# Patient Record
Sex: FEMALE | Race: WHITE | NOT HISPANIC OR LATINO | Employment: FULL TIME | URBAN - METROPOLITAN AREA
[De-identification: names, ages, dates, MRNs, and addresses within clinical notes are randomized per-mention and may not be internally consistent; named-entity substitution may affect disease eponyms.]

---

## 2017-04-12 DIAGNOSIS — E66.01 MORBID OBESITY, UNSPECIFIED OBESITY TYPE (H): Primary | ICD-10-CM

## 2017-05-17 ENCOUNTER — MYC MEDICAL ADVICE (OUTPATIENT)
Dept: FAMILY MEDICINE | Facility: CLINIC | Age: 32
End: 2017-05-17

## 2018-03-05 ENCOUNTER — NURSE TRIAGE (OUTPATIENT)
Dept: NURSING | Facility: CLINIC | Age: 33
End: 2018-03-05

## 2018-03-05 NOTE — TELEPHONE ENCOUNTER
Reason for Disposition    Fever present > 3 days (72 hours)    Additional Information    Negative: Influenza has been diagnosed by a HCP    Influenza suspected (i.e., fever and respiratory symptoms; probable influenza exposure)    Negative: Severe difficulty breathing (e.g., struggling for each breath, speaks in single words)    Negative: Bluish lips, tongue, or face now    Negative: Shock suspected (e.g., cold/pale/clammy skin, too weak to stand, low BP, rapid pulse)    Negative: Sounds like a life-threatening emergency to the triager    Negative: Severe sore throat    Negative: [1] Doesn't match the criteria for Influenza AND [2] sounds like a cold    Negative: Influenza vaccine reaction is suspected    Negative: Chest pain  (Exception: MILD central chest pain, present only when coughing)    Negative: [1] Headache AND [2] stiff neck (can't touch chin to chest)    Negative: Fever > 104 F (40 C)    Negative: [1] Difficulty breathing AND [2] not severe AND [3] not from stuffy nose (e.g., not relieved by cleaning out the nose)    Negative: Patient sounds very sick or weak to the triager    Negative: [1] Fever > 101 F (38.3 C) AND [2] age > 60    Negative: [1] Fever > 101 F (38.3 C) AND [2] bedridden (e.g., nursing home patient, CVA, chronic illness, recovering from surgery)    Negative: [1] Fever > 100.5 F (38.1 C) AND [2] diabetes mellitus or weak immune system (e.g., HIV positive, cancer chemo, splenectomy, organ transplant, chronic steroids)    Negative: Patient is HIGH RISK (e.g., age > 64 years, pregnant, HIV+, or chronic medical condition)    Protocols used: INFLUENZA - SEASONAL-ADULT-, INFLUENZA EXPOSURE-ADULT-  Caller states she is having flu symptoms and was exposed at the  she works at. Caller states she has body aches, sore throat, fever and stuffy nose. Triage guidelines recommend to see provider within 24 hours.Caller verbalized and understands directives.

## 2019-03-19 ENCOUNTER — NURSE TRIAGE (OUTPATIENT)
Dept: NURSING | Facility: CLINIC | Age: 34
End: 2019-03-19

## 2019-03-19 ENCOUNTER — HOSPITAL ENCOUNTER (EMERGENCY)
Facility: CLINIC | Age: 34
Discharge: HOME | End: 2019-03-19
Attending: PHYSICIAN ASSISTANT | Admitting: PHYSICIAN ASSISTANT

## 2019-03-19 VITALS
OXYGEN SATURATION: 95 % | TEMPERATURE: 99.7 F | BODY MASS INDEX: 51.02 KG/M2 | HEIGHT: 63 IN | RESPIRATION RATE: 16 BRPM | HEART RATE: 110 BPM

## 2019-03-19 DIAGNOSIS — J11.1 INFLUENZA-LIKE ILLNESS: ICD-10-CM

## 2019-03-19 PROCEDURE — G0463 HOSPITAL OUTPT CLINIC VISIT: HCPCS | Performed by: PHYSICIAN ASSISTANT

## 2019-03-19 PROCEDURE — 99214 OFFICE O/P EST MOD 30 MIN: CPT | Mod: Z6 | Performed by: PHYSICIAN ASSISTANT

## 2019-03-19 RX ORDER — ALBUTEROL SULFATE 90 UG/1
2 AEROSOL, METERED RESPIRATORY (INHALATION) EVERY 6 HOURS PRN
Qty: 18 G | Refills: 0 | Status: SHIPPED | OUTPATIENT
Start: 2019-03-19 | End: 2020-03-04

## 2019-03-19 RX ORDER — BENZONATATE 100 MG/1
100-200 CAPSULE ORAL 3 TIMES DAILY PRN
Qty: 42 CAPSULE | Refills: 0 | Status: SHIPPED | OUTPATIENT
Start: 2019-03-19 | End: 2019-04-18

## 2019-03-19 NOTE — ED AVS SNAPSHOT
[facility] Emergency Department  [address] [address]  Phone:  [phone]  Fax:  [phone]                                    Mary Alice Damon   MRN: [id number]    Department:  [facility] Emergency Department   Date of Visit:  3/19/2019           After Visit Summary Signature Page    I have received my discharge instructions, and my questions have been answered. I have discussed any challenges I see with this plan with the nurse or doctor.    ..........................................................................................................................................  Patient/Patient Representative Signature      ..........................................................................................................................................  Patient Representative Print Name and Relationship to Patient    ..................................................               ................................................  Date                                   Time    ..........................................................................................................................................  Reviewed by Signature/Title    ...................................................              ..............................................  Date                                               Time          22EPIC Rev 08/18

## 2019-03-19 NOTE — TELEPHONE ENCOUNTER
"Patient states she has a productive cough since 3/17/19. .   Also describes feeling achy all over and fever.  Currently temperature is 101.0 (O).  Describes a \"backache from coughing.\"   States had respiratory tightness last night when climbing stairs. States it may be due to feeling anxious with constant cough.  Has used \"Dayquil and ibuprofen and a vapor inhaler\" with slight temporary relief.   Attempting to take increased fluids but decreased intake.     Protocol- Cough- Acute Productive- Adult     Care advice reviewed.   Disposition-  See Physician within 24 hours    Caller states understanding of the recommended disposition. States she will go to [facility] today.   Advised to call back if further questions or concerns.     ROXANE Ames RN  [facility]     Reason for Disposition    [1] Continuous (nonstop) coughing interferes with work or school AND [2] no improvement using cough treatment per Care Advice    Additional Information    Negative: Severe difficulty breathing (e.g., struggling for each breath, speaks in single words)    Negative: Bluish lips, tongue, or face now    Negative: [1] Difficulty breathing AND [2] exposure to flames, smoke, or fumes    Negative: [1] Stridor AND [2] difficulty breathing    Negative: Sounds like a life-threatening emergency to the triager    Negative: Chest pain  (Exception: MILD central chest pain, present only when coughing)    Negative: Difficulty breathing    Negative: Patient sounds very sick or weak to the triager    Negative: [1] Coughed up blood AND [2] > 1 tablespoon (15 ml) (Exception: blood-tinged sputum)    Negative: Fever > 103 F (39.4 C)    Negative: [1] Fever > 101 F (38.3 C) AND [2] age > 60    Negative: [1] Fever > 101 F (38.3 C) AND [2] bedridden (e.g., nursing home patient, CVA, chronic illness, recovering from surgery)    Negative: [1] Fever > 100.5 F (38.1 C) AND [2] diabetes mellitus or weak immune system (e.g., HIV " positive, cancer chemo, splenectomy, organ transplant, chronic steroids)    Negative: Wheezing is present    Negative: SEVERE coughing spells (e.g., whooping sound after coughing, vomiting after coughing)    Protocols used: COUGH - ACUTE PRODUCTIVE-ADULT-AH

## 2019-03-19 NOTE — ED PROVIDER NOTES
History     Chief Complaint   Patient presents with     Cough     cough and cold since Sunday with fever     HPI  Mary Alice Damon is a 33 year old female who presents to the urgent care with concern over cough and fever which been present for the last 48 hours.  Patient complains of fever measured up to 101.8 orally.  She also notes chills, myalgias, sore throat, shortness of breath with activity and possible wheezing.  He has had minimal nasal congestion.  No nausea, vomiting, or abdominal pain.  She has attempted to treat with OTC antipyretic.  She denies any history of asthma, COPD or other breathing related disorders.  She has had multiple ill contacts through her work at a .  She did not receive an influenza vaccine this year.     Allergies:  No Known Allergies    Problem List:    Patient Active Problem List    Diagnosis Date Noted     Irregular menses 02/11/2016     Priority: Medium     Type 2 diabetes mellitus without complications (H) 10/23/2015     Priority: Medium     Elevated liver enzymes 10/08/2015     Priority: Medium     Female infertility, secondary 10/06/2015     Priority: Medium     Morbidly obese (H) 08/14/2014     Priority: Medium      Past Medical History:    Past Medical History:   Diagnosis Date     Irritable bowel syndrome (IBS)      Past Surgical History:    Past Surgical History:   Procedure Laterality Date     TONSILLECTOMY  6th grade     Family History:    Family History   Problem Relation Age of Onset     Heart Disease Father      Social History:  Marital Status:  Single [1]  Social History     Tobacco Use     Smoking status: Former Smoker     Smokeless tobacco: Never Used   Substance Use Topics     Alcohol use: Yes     Alcohol/week: 0.0 oz     Comment: rare     Drug use: No      Medications:      Acetaminophen (TYLENOL PO)   medroxyPROGESTERone (PROVERA) 10 MG tablet   Prenatal Vit-Fe Fumarate-FA (PRENATAL PO)     Review of Systems  CONSTITUTIONAL:POSITIVE  for fever, chills  "myalgias   INTEGUMENTARY/SKIN: NEGATIVE for worrisome rashes, moles or lesions  EYES: NEGATIVE for vision changes or irritation  ENT/MOUTH: POSITIVE for sore throat, bilateral ear pain, minimal nasal congestion  RESP:POSITIVE for cough, shortness of breath, wheezing   GI: NEGATIVE for abdominal pain, nausea and vomiting  Physical Exam   Pulse: 110  Temp: 99.7  F (37.6  C)  Resp: 16  Height: 160 cm (5' 3\")  SpO2: 95 %  Physical Exam  GENERAL APPEARANCE: healthy, alert and no distress  EYES: EOMI,  PERRL, conjunctiva clear  HENT: ear canals and TM's normal.  Nose and mouth without ulcers, erythema or lesions  NECK: supple, nontender, no lymphadenopathy  RESP: lungs clear to auscultation - no rales, rhonchi or wheezes  CV: tachycardia, regular hythm, normal S1 S2, no murmur noted  ABDOMEN:  soft, nontender, no HSM or masses and bowel sounds normal  SKIN: no suspicious lesions or rashes  ED Course        Procedures        Critical Care time:  none        No results found for this or any previous visit (from the past 24 hour(s)).  Medications - No data to display    Assessments & Plan (with Medical Decision Making)     I have reviewed the nursing notes.    I have reviewed the findings, diagnosis, plan and need for follow up with the patient.          Medication List      Started    albuterol 108 (90 Base) MCG/ACT inhaler  Commonly known as:  PROAIR HFA/PROVENTIL HFA/VENTOLIN HFA  2 puffs, Inhalation, EVERY 6 HOURS PRN     benzonatate 100 MG capsule  Commonly known as:  TESSALON  100-200 mg, Oral, 3 TIMES DAILY PRN          Final diagnoses:   Influenza-like illness     33-year-old female presents to the urgent care with concern over 48-hour history of fever up to 101.8, chills, myalgias, cough, shortness of breath and possible wheezing.  She was noted to be minimally tachycardic upon arrival, remainder of vital signs within normal limits.  Physical exam findings were benign.  Symptoms are classic for influenza-like " illness.  I did discuss her/benefits of testing for influenza as well as risk/benefits of empiric treatment with Tamiflu and patient declined both.  Differential for her symptoms and include other viral URI.  I do not suspect pneumonia, pertussis, PE, bacterial sinusitis and will defer further evaluation.  She was discharged home stable with prescription for Tessalon, albuterol.  Follow-up with primary care provider for recheck if no resolution of fever within the next 3 days.  Worrisome reasons to return to the ER/UC sooner discussed.    Disclaimer: This note consists of symbols derived from keyboarding, dictation, and/or voice recognition software. As a result, there may be errors in the script that have gone undetected.  Please consider this when interpreting information found in the chart.      3/19/2019   [facility] EMERGENCY DEPARTMENT     Ashlie Houston PA-C  03/21/19 1112

## 2019-10-23 ENCOUNTER — OFFICE VISIT (OUTPATIENT)
Dept: FAMILY MEDICINE | Facility: CLINIC | Age: 34
End: 2019-10-23

## 2019-10-23 ENCOUNTER — NURSE TRIAGE (OUTPATIENT)
Dept: NURSING | Facility: CLINIC | Age: 34
End: 2019-10-23

## 2019-10-23 VITALS
DIASTOLIC BLOOD PRESSURE: 88 MMHG | BODY MASS INDEX: 49.15 KG/M2 | OXYGEN SATURATION: 98 % | TEMPERATURE: 97.9 F | HEIGHT: 63 IN | WEIGHT: 277.4 LBS | RESPIRATION RATE: 14 BRPM | HEART RATE: 92 BPM | SYSTOLIC BLOOD PRESSURE: 138 MMHG

## 2019-10-23 DIAGNOSIS — R21 RASH AND NONSPECIFIC SKIN ERUPTION: Primary | ICD-10-CM

## 2019-10-23 DIAGNOSIS — Z23 NEED FOR TD VACCINE: ICD-10-CM

## 2019-10-23 DIAGNOSIS — Z23 NEED FOR PROPHYLACTIC VACCINATION AND INOCULATION AGAINST INFLUENZA: ICD-10-CM

## 2019-10-23 PROCEDURE — 90471 IMMUNIZATION ADMIN: CPT | Performed by: NURSE PRACTITIONER

## 2019-10-23 PROCEDURE — 90714 TD VACC NO PRESV 7 YRS+ IM: CPT | Performed by: NURSE PRACTITIONER

## 2019-10-23 PROCEDURE — 90472 IMMUNIZATION ADMIN EACH ADD: CPT | Performed by: NURSE PRACTITIONER

## 2019-10-23 PROCEDURE — 99203 OFFICE O/P NEW LOW 30 MIN: CPT | Mod: 25 | Performed by: NURSE PRACTITIONER

## 2019-10-23 PROCEDURE — 90686 IIV4 VACC NO PRSV 0.5 ML IM: CPT | Performed by: NURSE PRACTITIONER

## 2019-10-23 RX ORDER — HYDROXYZINE HYDROCHLORIDE 10 MG/1
10-20 TABLET, FILM COATED ORAL EVERY 6 HOURS PRN
Qty: 20 TABLET | Refills: 1 | Status: SHIPPED | OUTPATIENT
Start: 2019-10-23 | End: 2020-03-04

## 2019-10-23 ASSESSMENT — MIFFLIN-ST. JEOR: SCORE: 1917.41

## 2019-10-23 NOTE — TELEPHONE ENCOUNTER
She had set up an appointment and will keep that rather than go into the ER for evaluation. She has itching and when she showers, it seems like it's welts, like hives would be. Then it settles down into a pink rash.  Loreta Ahn RN-[facility]      Reason for Disposition    Rash looks like large or small blisters (i.e., fluid filled bubbles or sacs on the skin)    Additional Information    Negative: [1] Life-threatening reaction (anaphylaxis) in the past to similar substance (e.g., food, insect bite/sting, chemical, etc.) AND [2] < 2 hours since exposure    Negative: [1] Sudden onset of rash (within last 2 hours) AND [2] difficulty with breathing or swallowing    Negative: Shock suspected (e.g., cold/pale/clammy skin, too weak to stand, low BP, rapid pulse)    Negative: Difficult to awaken or acting confused (e.g., disoriented, slurred speech)    Negative: [1] Purple or blood-colored spots or dots AND [2] fever    Negative: Sounds like a life-threatening emergency to the triager    Negative: Insect bites suspected    Negative: Swimmer's Itch suspected    Negative: Sunburn suspected    Negative: Hives suspected    Negative: [1] Chickenpox suspected AND [2] known exposure to chickenpox in past 3 weeks    Negative: [1] Drug rash suspected AND [2] started taking new medicine within last 2 weeks(Exception: antihistamine, eye drops, ear drops, decongestant or other OTC cough/cold medicines)    Negative: [1] Widespread rash AND [2] bright red, sunburn-like AND [3] current tampon use or nasal packing    Negative: [1] Widespread rash AND [2] bright red, sunburn-like AND [3] wound infection or recent surgery    Negative: [1] Bright red skin AND [2] peels off in sheets    Negative: Stiff neck (unable to touch chin to chest)    Negative: Fever    Negative: Joint pain or swelling    Protocols used: RASH OR REDNESS - WIDESPREAD-A-

## 2019-10-23 NOTE — PROGRESS NOTES
Subjective     Mary Alice Damon is a 34 year old female who presents to clinic today for the following health issues:  Chief Complaint   Patient presents with     Derm Problem     bilateral thighs x 1 week      Imm/Inj     Flu Shot       HPI   Rash      Duration: x 1 week    Description  Location: entire body per patient.   Itching: severe    Intensity:  severe    Accompanying signs and symptoms: Pt states that it looks like a hive to start and then they blister. She states that the itching is severe and now it is painful.     History (similar episodes/previous evaluation): None    Precipitating or alleviating factors:  New exposures:  None. She does state that the only thing that was different was that she used a generic tampon but she immediately stopped when she saw the rash.   Recent travel: no      Therapies tried and outcome: none and calamine lotion    Pt also states that she just found out that she was adopted 1 month ago.     No history of sensitive skin or allergies.  No recent travel or exposures.        Patient Active Problem List   Diagnosis     Morbidly obese (H)     Female infertility, secondary     Elevated liver enzymes     Type 2 diabetes mellitus without complications (H)     Irregular menses     Past Surgical History:   Procedure Laterality Date     TONSILLECTOMY  6th grade       Social History     Tobacco Use     Smoking status: Former Smoker     Packs/day: 0.00     Smokeless tobacco: Never Used   Substance Use Topics     Alcohol use: Yes     Alcohol/week: 0.0 standard drinks     Comment: rare     Family History   Problem Relation Age of Onset     Heart Disease Father      Diabetes Father          Current Outpatient Medications   Medication Sig Dispense Refill     hydrOXYzine (ATARAX) 10 MG tablet Take 1-2 tablets (10-20 mg) by mouth every 6 hours as needed for itching 20 tablet 1     permethrin (ELIMITE) 1 % external lotion Apply topically once for 1 dose 120 mL 0     Acetaminophen (TYLENOL PO)  "Take 1 tablet by mouth as needed for mild pain or fever        albuterol (PROAIR HFA/PROVENTIL HFA/VENTOLIN HFA) 108 (90 Base) MCG/ACT inhaler Inhale 2 puffs into the lungs every 6 hours as needed for shortness of breath / dyspnea or wheezing (Patient not taking: Reported on 10/23/2019) 18 g 0     medroxyPROGESTERone (PROVERA) 10 MG tablet Take 1 tablet (10 mg) by mouth daily (Patient not taking: Reported on 10/23/2019) 10 tablet 6     Prenatal Vit-Fe Fumarate-FA (PRENATAL PO)        No Known Allergies  Recent Labs   Lab Test 10/23/15  1020 10/08/15  0924 11/05/14  0910   A1C 6.8*  --   --    LDL  --  91  --    HDL  --  51  --    TRIG  --  143  --    ALT 86* 89* 61*   CR  --  0.54 0.69   GFRESTIMATED  --  >90  Non  GFR Calc   >90  Non  GFR Calc     GFRESTBLACK  --  >90   GFR Calc   >90   GFR Calc     POTASSIUM  --  3.7 3.8   TSH  --  0.74 0.25*      BP Readings from Last 3 Encounters:   10/23/19 (!) 144/92   04/18/16 137/79   12/28/15 139/82    Wt Readings from Last 3 Encounters:   10/23/19 125.8 kg (277 lb 6.4 oz)   04/18/16 130.6 kg (288 lb)   12/28/15 128.4 kg (283 lb)                    Reviewed and updated as needed this visit by Provider  Problems         Review of Systems   ROS COMP: Constitutional, HEENT, cardiovascular, pulmonary, GI, , musculoskeletal, neuro, skin, endocrine and psych systems are negative, except as otherwise noted.      Objective    BP (!) 144/92 (BP Location: Right arm, Patient Position: Sitting, Cuff Size: Adult Regular)   Pulse 92   Temp 97.9  F (36.6  C) (Tympanic)   Resp 14   Ht 1.6 m (5' 3\")   Wt 125.8 kg (277 lb 6.4 oz)   SpO2 98%   BMI 49.14 kg/m    Body mass index is 49.14 kg/m .  Physical Exam   GENERAL: healthy, alert and no distress  SKIN: scattered pinpoint mildly erythematous rash on thighs, calves, abdomen, arms - some mild excoriation.    Diagnostic Test Results:  Labs reviewed in Epic    " "    Assessment & Plan     1. Rash and nonspecific skin eruption   The risks, benefits and treatment options of prescribed medications or other treatments have been discussed with the patient. The patient verbalized their understanding and should call or follow up if no improvement or if they develop further problems.        - hydrOXYzine (ATARAX) 10 MG tablet; Take 1-2 tablets (10-20 mg) by mouth every 6 hours as needed for itching  Dispense: 20 tablet; Refill: 1  - permethrin (ELIMITE) 1 % external lotion; Apply topically once for 1 dose  Dispense: 120 mL; Refill: 0    2. Need for Td vaccine     - TD PRESERV FREE, IM (7+ YRS)  - EA ADD'L VACCINE  - INFLUENZA VACCINE IM > 6 MONTHS VALENT IIV4 [07337]  - Vaccine Administration, Initial [11761]    3. Need for prophylactic vaccination and inoculation against influenza     - INFLUENZA VACCINE IM > 6 MONTHS VALENT IIV4 [39561]  - Vaccine Administration, Initial [21545]     BMI:   Estimated body mass index is 49.14 kg/m  as calculated from the following:    Height as of this encounter: 1.6 m (5' 3\").    Weight as of this encounter: 125.8 kg (277 lb 6.4 oz).     See PCP for weight and diet.      Patient Instructions   Start Zyrtec 10 mg twice daily for the next 3-4 days - then can take once daily and then stop when rash resolves.    Hydroxyzine 10-20 mg as needed (every 8 hours) for itching and rash.  May also use over the counter topical cortisone cream.    Elimite lotion - if above not responsive. To treat for possible scabies.    Follow up if rash not improving.    LALITA Andre          Return in about 2 weeks (around 11/6/2019).    Itzel Jodran NP  [facility]    "

## 2019-10-23 NOTE — PATIENT INSTRUCTIONS
Start Zyrtec 10 mg twice daily for the next 3-4 days - then can take once daily and then stop when rash resolves.    Hydroxyzine 10-20 mg as needed (every 8 hours) for itching and rash.  May also use over the counter topical cortisone cream.    Elimite lotion - if above not responsive. To treat for possible scabies.    Follow up if rash not improving.    LALITA Andre

## 2019-10-23 NOTE — NURSING NOTE
Prior to immunization administration, verified patients identity using patient s name and date of birth. Please see Immunization Activity for additional information.     Screening Questionnaire for Adult Immunization    Are you sick today?   No   Do you have allergies to medications, food, a vaccine component or latex?   No   Have you ever had a serious reaction after receiving a vaccination?   No   Do you have a long-term health problem with heart disease, lung disease, asthma, kidney disease, metabolic disease (e.g. diabetes), anemia, or other blood disorder?   Yes   Do you have cancer, leukemia, HIV/AIDS, or any other immune system problem?   No   In the past 3 months, have you taken medications that affect  your immune system, such as prednisone, other steroids, or anticancer drugs; drugs for the treatment of rheumatoid arthritis, Crohn s disease, or psoriasis; or have you had radiation treatments?   No   Have you had a seizure, or a brain or other nervous system problem?   No   During the past year, have you received a transfusion of blood or blood     products, or been given immune (gamma) globulin or antiviral drug?   No   For women: Are you pregnant or is there a chance you could become        pregnant during the next month?   No   Have you received any vaccinations in the past 4 weeks?   No     Immunization questionnaire was positive for at least one answer. Ok per guidelines for TD and FLu injections          Patient instructed to remain in clinic for 15 minutes afterwards, and to report any adverse reaction to me immediately.       Screening performed by Addy Hansen CMA on 10/23/2019 at 9:42 AM.

## 2019-10-24 ENCOUNTER — TELEPHONE (OUTPATIENT)
Dept: FAMILY MEDICINE | Facility: CLINIC | Age: 34
End: 2019-10-24

## 2019-10-24 DIAGNOSIS — R21 RASH AND NONSPECIFIC SKIN ERUPTION: Primary | ICD-10-CM

## 2019-10-24 RX ORDER — PERMETHRIN 50 MG/G
CREAM TOPICAL
Qty: 60 G | Refills: 1 | Status: SHIPPED | OUTPATIENT
Start: 2019-10-24 | End: 2020-03-04

## 2019-10-24 NOTE — TELEPHONE ENCOUNTER
Routed to care team as provider is out of clinic today.  Permethrin lotion 1% was prescribed for bilateral thigh rash.    Cream option comes in 5% strength.    Sabrina Flaherty RN

## 2019-10-24 NOTE — TELEPHONE ENCOUNTER
"Request from pharmacy states, \"Cream not avail-can we change to cream? Thank you\" The prescription is for PERMETHRIN 1% LOTION... did they mean lotion is not available please change to cream?? Thanks  "

## 2019-11-03 ENCOUNTER — HEALTH MAINTENANCE LETTER (OUTPATIENT)
Age: 34
End: 2019-11-03

## 2020-03-04 ENCOUNTER — OFFICE VISIT (OUTPATIENT)
Dept: FAMILY MEDICINE | Facility: CLINIC | Age: 35
End: 2020-03-04
Payer: COMMERCIAL

## 2020-03-04 VITALS
HEART RATE: 84 BPM | WEIGHT: 268.25 LBS | TEMPERATURE: 98.6 F | SYSTOLIC BLOOD PRESSURE: 132 MMHG | HEIGHT: 65 IN | RESPIRATION RATE: 20 BRPM | BODY MASS INDEX: 44.69 KG/M2 | OXYGEN SATURATION: 97 % | DIASTOLIC BLOOD PRESSURE: 80 MMHG

## 2020-03-04 DIAGNOSIS — F41.9 ANXIETY: ICD-10-CM

## 2020-03-04 DIAGNOSIS — F41.0 PANIC ATTACKS: Primary | ICD-10-CM

## 2020-03-04 PROCEDURE — 99214 OFFICE O/P EST MOD 30 MIN: CPT | Performed by: FAMILY MEDICINE

## 2020-03-04 PROCEDURE — 96127 BRIEF EMOTIONAL/BEHAV ASSMT: CPT | Mod: 59 | Performed by: FAMILY MEDICINE

## 2020-03-04 RX ORDER — LORAZEPAM 0.5 MG/1
0.5 TABLET ORAL EVERY 8 HOURS PRN
Qty: 30 TABLET | Refills: 0 | Status: SHIPPED | OUTPATIENT
Start: 2020-03-04 | End: 2020-11-05

## 2020-03-04 ASSESSMENT — ANXIETY QUESTIONNAIRES
3. WORRYING TOO MUCH ABOUT DIFFERENT THINGS: NEARLY EVERY DAY
6. BECOMING EASILY ANNOYED OR IRRITABLE: MORE THAN HALF THE DAYS
1. FEELING NERVOUS, ANXIOUS, OR ON EDGE: SEVERAL DAYS
IF YOU CHECKED OFF ANY PROBLEMS ON THIS QUESTIONNAIRE, HOW DIFFICULT HAVE THESE PROBLEMS MADE IT FOR YOU TO DO YOUR WORK, TAKE CARE OF THINGS AT HOME, OR GET ALONG WITH OTHER PEOPLE: NOT DIFFICULT AT ALL
2. NOT BEING ABLE TO STOP OR CONTROL WORRYING: MORE THAN HALF THE DAYS
GAD7 TOTAL SCORE: 14
7. FEELING AFRAID AS IF SOMETHING AWFUL MIGHT HAPPEN: MORE THAN HALF THE DAYS
5. BEING SO RESTLESS THAT IT IS HARD TO SIT STILL: MORE THAN HALF THE DAYS

## 2020-03-04 ASSESSMENT — PATIENT HEALTH QUESTIONNAIRE - PHQ9
SUM OF ALL RESPONSES TO PHQ QUESTIONS 1-9: 2
5. POOR APPETITE OR OVEREATING: MORE THAN HALF THE DAYS

## 2020-03-04 ASSESSMENT — MIFFLIN-ST. JEOR: SCORE: 1899.71

## 2020-03-04 NOTE — PROGRESS NOTES
"Subjective     Mary Alice Damon is a 34 year old female who presents to clinic today for the following health issues:    HPI   Abnormal Mood Symptoms      Duration: ongoing for the past couple of years - worse over the past six months     Having a least one \" panic attack\" a month     Description:  Depression: no- still able to go to work do normal activities   Anxiety: YES - worries about 'everything; worries that something bad is always going to happen.  Panic attacks: YES - described as tachycardia, hyperventilating, feeling of impending doom, feeling she needs to go outdoors \"for air\"; I feel like I'm going crazy\"; patient reports she feels she is going to lose control of everything   Denies specific triggers.  Denies repetitive rechecking or redoing tasks or actions; denies suicidality/homicidality; denies hallucinations.    Accompanying signs and symptoms: see PHQ-9 and ROBERT scores    History (similar episodes/previous evaluation): None    Precipitating or alleviating factors: None    Therapies tried and outcome:  Working out , Mood apps on phone/ was trying to manage at home without trying medication . Having a lot of stress about starting medications.    Patient denies being treated for any psychiatric condition in the past.    Patient Active Problem List   Diagnosis     Morbidly obese (H)     Female infertility, secondary     Elevated liver enzymes     Type 2 diabetes mellitus without complications (H)     Irregular menses     Past Surgical History:   Procedure Laterality Date     TONSILLECTOMY  6th grade       Social History     Tobacco Use     Smoking status: Former Smoker     Packs/day: 0.00     Smokeless tobacco: Never Used   Substance Use Topics     Alcohol use: Yes     Alcohol/week: 0.0 standard drinks     Comment: rare     Family History   Problem Relation Age of Onset     Heart Disease Father      Diabetes Father          Current Outpatient Medications   Medication Sig Dispense Refill     LORazepam " "(ATIVAN) 0.5 MG tablet Take 1 tablet (0.5 mg) by mouth every 8 hours as needed (at start of panic attacks) 30 tablet 0     No Known Allergies    Reviewed and updated as needed this visit by Provider  Tobacco  Allergies  Meds  Problems  Med Hx  Surg Hx  Fam Hx         Review of Systems   C: NEGATIVE for fever, chills, change in weight  I: NEGATIVE for worrisome rashes, moles or lesions  E: NEGATIVE for vision changes or irritation  E/M: NEGATIVE for ear, mouth and throat problems  R: NEGATIVE for significant cough or SOB  CV: NEGATIVE for chest pain, palpitations or peripheral edema  GI: NEGATIVE for nausea, abdominal pain, heartburn, or change in bowel habits  : NEGATIVE for frequency, dysuria, or hematuria  N: NEGATIVE for weakness, dizziness or paresthesias  E: NEGATIVE for temperature intolerance, skin/hair changes  PSYCHIATRIC: see above      Objective    /80   Pulse 84   Temp 98.6  F (37  C) (Tympanic)   Resp 20   Ht 1.638 m (5' 4.5\")   Wt 121.7 kg (268 lb 4 oz)   SpO2 97%   BMI 45.33 kg/m    Body mass index is 45.33 kg/m .  Physical Exam   GENERAL: alert and no distress  EYES: no icterus, PERRLA  NECK: no tenderness, no adenopathy,  Thyroid not enlarged  RESP: lungs clear to auscultation - no rales, no rhonchi, no wheezes  CV: regular rates and rhythm, no murmur  MS: no edema  SKIN: no jaundice or rash  NEURO: no tremors  PSYCH: well-kempt,  linear thought process, normal speech, good insight/judgement, slightly anxious mood, labile affect (tearful when she started describing her symptoms), no suicidality, no aggression, no hallucination  ABD: flat, nontender    Diagnostic Test Results:  none         Assessment & Plan     Mary Alice was seen today for anxiety.    Diagnoses and all orders for this visit:    Panic attacks  -     LORazepam (ATIVAN) 0.5 MG tablet; Take 1 tablet (0.5 mg) by mouth every 8 hours as needed (at start of panic attacks)  -     MENTAL HEALTH REFERRAL  - Adult; Outpatient " "Treatment; Individual/Couples/Family/Group Therapy/Health Psychology; [facility] [place]: [facility] [phone]; We will contact you to schedule the appointment or please call with any questions  Patient was advised on nature, course and treatment of panic attacks.  Reassured her there is no obvious serious condition at this time.  Discussed safe use of benzos in detail. Patient verbalized understanding and agreed to do so.  Patient concurred with CBT referral.  Return precautions discussed and given to patient.    Anxiety  -     MENTAL HEALTH REFERRAL  - Adult; Outpatient Treatment; Individual/Couples/Family/Group Therapy/Health Psychology; [facility] [place]: [facility] [phone]; We will contact you to schedule the appointment or please call with any questions  Does have \"daily anxiety\".   However, she denies it significantly affecting her ADLs and work.  Discussed pros and cons of medical tx with serotonin specific reuptake inhibitor. Patient deferred starting one.  Treat with CBT first. Will consider serotonin specific reuptake inhibitor if not better after next 1-2 months.  Return precautions discussed and given to patient.           Patient Instructions   Take lorazepam as prescribed only for panic attacks.  Do not drive or operate heavy equipment when on this medicaiton.  Do not drink alcohol when taking this medication, or up to several hours from taking medication.    You will be contacted in 1-2 business days to get a schedule for the behavior therapist.    Reduce social media exposure.  Watch news maybe once a day only if you get worried when watching it.    Patient Education     Panic Attack  A panic attack is an extreme fear reaction that comes on for no clear reason. There is often a fear that something terrible will happen or that you may die. The attack may last a few minutes up to a few hours. Between attacks, things will seem quite normal. This condition has a psychological cause and " can be treated with the help of a therapist or psychiatrist. Medicine can be very helpful for this problem.  Panic attacks usually come on suddenly, reaches a peak within minutes, and includes at least 4 of these symptoms:    Palpitations, pounding heart, or accelerated heart rate    Sweating    Chills or heat sensations    Trembling or shaking    Sensations of shortness of breath or smothering    Feelings of choking    Chest pain or discomfort    Nausea or abdominal distress    Feeling dizzy, unsteady, light-headed, or faint    Numbness or tingling sensations    Fear of dying    Fear of going crazy or of losing control    Feelings of unreality, strangeness, or detachment from the environment  Many of these symptoms can be linked to physical problems, so it is sometimes necessary to rule out conditions like thyroid disorders, heart disease, gastrointestinal problems, and others. They can also start as physical symptoms, but psychologically we may react to them in a fearful way, worsening the way we react and feel.  Home care    Try to find the sources of stress in your life. They may not be obvious. These may include:  ? Daily hassles of life which pile up (traffic jams, missed appointments, car troubles).  ? Major life changes, both good (new baby, job promotion) and bad (loss of job, loss of loved one).  ? Feeling that you have too many responsibilities and can't take care of everything at once.  ? Helplessness: feeling like your problems are too much for you to handle.    Notice how your body reacts to stress. Learn to listen to your body signals so that you can take action before the stress becomes severe.    Try to be aware of what you were doing before the reaction started; this may give you clues to things that can trigger a reaction. It may be situations in your life, or what you were doing at the time.    When possible, avoid or reduce the cause of stress. Avoid hassles, limit the amount of change that is  happening in your life at one time or take a break when you feel overloaded.    Unfortunately, you can't stay away from many stressful situations. So you need to learn how to manage stress better. Many proven methods will reduce your anxiety. These include simple things like exercise, good nutrition, and adequate rest. Also, there are certain techniques that are helpful: relaxation and breathing exercises, visualization, biofeedback, meditation, or simply taking time-out to clear your mind. For more information about this, ask your doctor or go to a local bookstore and review the many books and tapes available on this subject.  Follow-up care  Follow-up with your healthcare provider, or as advised.  Call 911  Call 911 if you:    Have suicidal thoughts, a suicide plan, and the means to carry out the plan    Have serious thoughts of hurting someone else    Have trouble breathing    Are very confused    Feel very drowsy or have trouble awakening    Faint or lose consciousness    Have new chest pain that becomes more severe, lasts longer, or spreads into your shoulder, arm, neck, jaw, or back    Have a very rapid or irregular heartbeat    Have a seizure  When to seek medical advice  Call your healthcare provider right away if any of these occur:    Worsening of your symptoms to the point of feeling out-of-control    Feeling that you may try to harm yourself or another    Can't sleep or eat for 3 days in a row    Increased pain with breathing    Increasing feeling of weakness or dizziness    Cough with dark colored sputum (phlegm) or blood    Fever of 100.4 F (38 C) or higher, or as directed by your healthcare provider    Swelling, pain, or redness in one leg    Requests by family or friends for you to seek help for your symptoms  Date Last Reviewed: 3/1/2018    3220-1456 [employer]. [address] [place] [address], [place] [address]. All rights reserved. This information is not intended as a substitute for  professional medical care. Always follow your healthcare professional's instructions.               Return in about 1 month (around 4/4/2020) for if with more frequent or worsening attacks.    Crow Caballero MD  [facility]

## 2020-03-04 NOTE — PATIENT INSTRUCTIONS
Take lorazepam as prescribed only for panic attacks.  Do not drive or operate heavy equipment when on this medicaiton.  Do not drink alcohol when taking this medication, or up to several hours from taking medication.    You will be contacted in 1-2 business days to get a schedule for the behavior therapist.    Reduce social media exposure.  Watch news maybe once a day only if you get worried when watching it.    Patient Education     Panic Attack  A panic attack is an extreme fear reaction that comes on for no clear reason. There is often a fear that something terrible will happen or that you may die. The attack may last a few minutes up to a few hours. Between attacks, things will seem quite normal. This condition has a psychological cause and can be treated with the help of a therapist or psychiatrist. Medicine can be very helpful for this problem.  Panic attacks usually come on suddenly, reaches a peak within minutes, and includes at least 4 of these symptoms:    Palpitations, pounding heart, or accelerated heart rate    Sweating    Chills or heat sensations    Trembling or shaking    Sensations of shortness of breath or smothering    Feelings of choking    Chest pain or discomfort    Nausea or abdominal distress    Feeling dizzy, unsteady, light-headed, or faint    Numbness or tingling sensations    Fear of dying    Fear of going crazy or of losing control    Feelings of unreality, strangeness, or detachment from the environment  Many of these symptoms can be linked to physical problems, so it is sometimes necessary to rule out conditions like thyroid disorders, heart disease, gastrointestinal problems, and others. They can also start as physical symptoms, but psychologically we may react to them in a fearful way, worsening the way we react and feel.  Home care    Try to find the sources of stress in your life. They may not be obvious. These may include:  ? Daily hassles of life which pile up (traffic jams,  missed appointments, car troubles).  ? Major life changes, both good (new baby, job promotion) and bad (loss of job, loss of loved one).  ? Feeling that you have too many responsibilities and can't take care of everything at once.  ? Helplessness: feeling like your problems are too much for you to handle.    Notice how your body reacts to stress. Learn to listen to your body signals so that you can take action before the stress becomes severe.    Try to be aware of what you were doing before the reaction started; this may give you clues to things that can trigger a reaction. It may be situations in your life, or what you were doing at the time.    When possible, avoid or reduce the cause of stress. Avoid hassles, limit the amount of change that is happening in your life at one time or take a break when you feel overloaded.    Unfortunately, you can't stay away from many stressful situations. So you need to learn how to manage stress better. Many proven methods will reduce your anxiety. These include simple things like exercise, good nutrition, and adequate rest. Also, there are certain techniques that are helpful: relaxation and breathing exercises, visualization, biofeedback, meditation, or simply taking time-out to clear your mind. For more information about this, ask your doctor or go to a local bookstore and review the many books and tapes available on this subject.  Follow-up care  Follow-up with your healthcare provider, or as advised.  Call 911  Call 911 if you:    Have suicidal thoughts, a suicide plan, and the means to carry out the plan    Have serious thoughts of hurting someone else    Have trouble breathing    Are very confused    Feel very drowsy or have trouble awakening    Faint or lose consciousness    Have new chest pain that becomes more severe, lasts longer, or spreads into your shoulder, arm, neck, jaw, or back    Have a very rapid or irregular heartbeat    Have a seizure  When to seek medical  advice  Call your healthcare provider right away if any of these occur:    Worsening of your symptoms to the point of feeling out-of-control    Feeling that you may try to harm yourself or another    Can't sleep or eat for 3 days in a row    Increased pain with breathing    Increasing feeling of weakness or dizziness    Cough with dark colored sputum (phlegm) or blood    Fever of 100.4 F (38 C) or higher, or as directed by your healthcare provider    Swelling, pain, or redness in one leg    Requests by family or friends for you to seek help for your symptoms  Date Last Reviewed: 3/1/2018    0684-0367 [employer]. [address] [place] [id number]. All rights reserved. This information is not intended as a substitute for professional medical care. Always follow your healthcare professional's instructions.

## 2020-03-04 NOTE — NURSING NOTE
"Initial BP (!) 141/91 (BP Location: Right arm, Patient Position: Sitting, Cuff Size: Adult Large)   Pulse 84   Temp 98.6  F (37  C) (Tympanic)   Resp 20   Ht 1.638 m (5' 4.5\")   Wt 121.7 kg (268 lb 4 oz)   SpO2 97%   BMI 45.33 kg/m   Estimated body mass index is 45.33 kg/m  as calculated from the following:    Height as of this encounter: 1.638 m (5' 4.5\").    Weight as of this encounter: 121.7 kg (268 lb 4 oz). .    Christy Amaya MA    "

## 2020-03-05 ASSESSMENT — ANXIETY QUESTIONNAIRES: GAD7 TOTAL SCORE: 14

## 2020-03-09 ENCOUNTER — OFFICE VISIT (OUTPATIENT)
Dept: FAMILY MEDICINE | Facility: CLINIC | Age: 35
End: 2020-03-09
Payer: COMMERCIAL

## 2020-03-09 VITALS
DIASTOLIC BLOOD PRESSURE: 88 MMHG | RESPIRATION RATE: 18 BRPM | OXYGEN SATURATION: 98 % | BODY MASS INDEX: 44.98 KG/M2 | TEMPERATURE: 98.6 F | WEIGHT: 270 LBS | HEIGHT: 65 IN | SYSTOLIC BLOOD PRESSURE: 136 MMHG | HEART RATE: 80 BPM

## 2020-03-09 DIAGNOSIS — F41.9 ANXIETY: Primary | ICD-10-CM

## 2020-03-09 PROCEDURE — 99214 OFFICE O/P EST MOD 30 MIN: CPT | Performed by: FAMILY MEDICINE

## 2020-03-09 RX ORDER — PAROXETINE 10 MG/1
10 TABLET, FILM COATED ORAL EVERY MORNING
Qty: 30 TABLET | Refills: 0 | Status: SHIPPED | OUTPATIENT
Start: 2020-03-09 | End: 2020-04-22

## 2020-03-09 ASSESSMENT — ANXIETY QUESTIONNAIRES
3. WORRYING TOO MUCH ABOUT DIFFERENT THINGS: NEARLY EVERY DAY
2. NOT BEING ABLE TO STOP OR CONTROL WORRYING: MORE THAN HALF THE DAYS
1. FEELING NERVOUS, ANXIOUS, OR ON EDGE: NEARLY EVERY DAY
5. BEING SO RESTLESS THAT IT IS HARD TO SIT STILL: SEVERAL DAYS
GAD7 TOTAL SCORE: 15
7. FEELING AFRAID AS IF SOMETHING AWFUL MIGHT HAPPEN: MORE THAN HALF THE DAYS
6. BECOMING EASILY ANNOYED OR IRRITABLE: MORE THAN HALF THE DAYS

## 2020-03-09 ASSESSMENT — PATIENT HEALTH QUESTIONNAIRE - PHQ9
5. POOR APPETITE OR OVEREATING: MORE THAN HALF THE DAYS
SUM OF ALL RESPONSES TO PHQ QUESTIONS 1-9: 4

## 2020-03-09 ASSESSMENT — MIFFLIN-ST. JEOR: SCORE: 1907.65

## 2020-03-09 NOTE — PROGRESS NOTES
Subjective     Mary Alice Damon is a 34 year old female who presents to clinic today for the following health issues:    Patient is a 34 yr old female here for anxiety issues. She reports that in the last six months her anxiety has worsened. She is having more panic attacks that come out of nowhere. She reports that the symptoms are getting debilitating. She was seen last week and was prescribed lorazepam but she feels like she may benefit from a daily anxiety medication. She denies any suicidal ideations.      HPI   Anxiety Follow-Up    How are you doing with your anxiety since your last visit? Worsened Patient has not gotten medication yet due to insurance and did have a panic attack yesterday, was able to manage it but just getting out and getting some air     Are you having other symptoms that might be associated with anxiety? Yes:  panic attacks with rapid heart rate and stomach issures with diarrhea     Have you had a significant life event? OTHER: patient found out that she was adopted from father      Are you feeling depressed? No    Do you have any concerns with your use of alcohol or other drugs? No    Social History     Tobacco Use     Smoking status: Former Smoker     Packs/day: 0.00     Smokeless tobacco: Never Used   Substance Use Topics     Alcohol use: Yes     Alcohol/week: 0.0 standard drinks     Comment: rare     Drug use: No     ROBERT-7 SCORE 3/4/2020 3/9/2020   Total Score 14 15     PHQ 3/4/2020 3/9/2020   PHQ-9 Total Score 2 4   Q9: Thoughts of better off dead/self-harm past 2 weeks Not at all Not at all           How many servings of fruits and vegetables do you eat daily?  2-3    On average, how many sweetened beverages do you drink each day (Examples: soda, juice, sweet tea, etc.  Do NOT count diet or artificially sweetened beverages)?   1    How many days per week do you exercise enough to make your heart beat faster? 6    How many minutes a day do you exercise enough to make your heart beat  "faster? 20 - 29    How many days per week do you miss taking your medication? Not on medication         Patient Active Problem List   Diagnosis     Morbidly obese (H)     Female infertility, secondary     Elevated liver enzymes     Type 2 diabetes mellitus without complications (H)     Irregular menses     Past Surgical History:   Procedure Laterality Date     TONSILLECTOMY  6th grade       Social History     Tobacco Use     Smoking status: Former Smoker     Packs/day: 0.00     Smokeless tobacco: Never Used   Substance Use Topics     Alcohol use: Yes     Alcohol/week: 0.0 standard drinks     Comment: rare     Family History   Problem Relation Age of Onset     Heart Disease Father      Diabetes Father          Current Outpatient Medications   Medication Sig Dispense Refill     LORazepam (ATIVAN) 0.5 MG tablet Take 1 tablet (0.5 mg) by mouth every 8 hours as needed (at start of panic attacks) 30 tablet 0     PARoxetine (PAXIL) 10 MG tablet Take 1 tablet (10 mg) by mouth every morning 30 tablet 0     No Known Allergies  BP Readings from Last 3 Encounters:   03/09/20 136/88   03/04/20 132/80   10/23/19 138/88    Wt Readings from Last 3 Encounters:   03/09/20 122.5 kg (270 lb)   03/04/20 121.7 kg (268 lb 4 oz)   10/23/19 125.8 kg (277 lb 6.4 oz)                      Reviewed and updated as needed this visit by Provider  Tobacco  Allergies  Meds  Problems  Med Hx  Surg Hx  Fam Hx         Review of Systems   ROS COMP: Constitutional, HEENT, cardiovascular, pulmonary, gi and gu systems are negative, except as otherwise noted.      Objective    /88   Pulse 80   Temp 98.6  F (37  C) (Tympanic)   Resp 18   Ht 1.638 m (5' 4.5\")   Wt 122.5 kg (270 lb)   SpO2 98%   BMI 45.63 kg/m    Body mass index is 45.63 kg/m .  Physical Exam   GENERAL: healthy, alert and no distress  EYES: Eyes grossly normal to inspection, PERRL and conjunctivae and sclerae normal  HENT: ear canals and TM's normal, nose and mouth without " ulcers or lesions  NECK: no adenopathy, no asymmetry, masses, or scars and thyroid normal to palpation  RESP: lungs clear to auscultation - no rales, rhonchi or wheezes  CV: regular rate and rhythm, normal S1 S2, no S3 or S4, no murmur, click or rub, no peripheral edema and peripheral pulses strong  ABDOMEN: soft, nontender, no hepatosplenomegaly, no masses and bowel sounds normal  MS: no gross musculoskeletal defects noted, no edema  PSYCH: mentation appears normal, affect flat, tearful, anxious, judgement and insight intact and appearance well groomed    Diagnostic Test Results:  none         Assessment & Plan     1. Anxiety  Had a long discussion with patient , recommend counseling which patient is opened to.Started patient on Paxil. Will like her to follow up in one month .  - PARoxetine (PAXIL) 10 MG tablet; Take 1 tablet (10 mg) by mouth every morning  Dispense: 30 tablet; Refill: 0       FUTURE APPOINTMENTS:       - Follow-up visit in one month or sooner as needed.  Patient Instructions       Patient Education     Treating Anxiety Disorders with Therapy    If you have an anxiety disorder, you don t have to suffer anymore. Treatment is available. Therapy (also called counseling) is often a helpful treatment for anxiety disorders. With therapy, a specially trained professional (therapist) helps you face and learn to manage your anxiety. Therapy can be short-term or long-term depending on your needs. In some cases, medicine may also be prescribed with therapy. It may take time before you notice how much therapy is helping, but stick with it. With therapy, you can feel better.  Cognitive behavioral therapy (CBT)  Cognitive behavioral therapy (CBT) teaches you to manage anxiety. It does this by helping you understand how you think and act when you re anxious. Research has shown CBT to be a very effective treatment for anxiety disorders. How CBT is run is almost like a class. It involves homework and activities to  build skills that teach you to cope with anxiety step by step. It can be done in a group or one-on-one, and often takes place for a set number of sessions. CBT has two main parts:    Cognitive therapy helps you identify the negative, irrational thoughts that occur with your anxiety. You ll learn to replace these with more positive, realistic thoughts.    Behavioral therapy helps you change how you react to anxiety. You ll learn coping skills and methods for relaxing to help you better deal with anxiety.  Other forms of therapy  Other therapy methods may work better for you than CBT. Or, you may move from CBT to another form of therapy as your treatment needs change. This may mean meeting with a therapist by yourself or in a group. Therapy can also help you work through problems in your life, such as drug or alcohol dependence, that may be making your anxiety worse.  Getting better takes time  Therapy will help you feel better and teach you skills to help manage anxiety long term. But change doesn t happen right away. It takes a commitment from you. And treatment only works if you learn to face the causes of your anxiety. So, you might feel worse before you feel better. This can sometimes make it hard to stick with it. But remember: Therapy is a very effective treatment. The results will be well worth it.  Helping yourself  If anxiety is wearing you down, here are some things you can do to cope:    Check with your doctor and rule out any physical problems that may be causing the anxiety symptoms.    If an anxiety disorder is diagnosed, seek mental healthcare. This is an illness and it can respond to treatment. Most types of anxiety disorders will respond to talk therapy and medicine.    Educate yourself about anxiety disorders. Keep track of helpful online resources and books you can use during stressful periods.    Try stress management techniques such as meditation.    Consider online or in-person support  groups.    Don t fight your feelings. Anxiety feeds itself. The more you worry about it, the worse it gets. Instead, try to identify what might have triggered your anxiety. Then try to put this threat in perspective.    Keep in mind that you can t control everything about a situation. Change what you can and let the rest take its course.    Exercise -- it s a great way to relieve tension and help your body feel relaxed.    Examine your life for stress, and try to find ways to reduce it.    Avoid caffeine and nicotine, which can make anxiety symptoms worse.    Fight the temptation to turn to alcohol or unprescribed drugs for relief. They only make things worse in the long run.   Date Last Reviewed: 1/1/2017 2000-2019 [employer]. [address] [place] [address], [place] [address]. All rights reserved. This information is not intended as a substitute for professional medical care. Always follow your healthcare professional's instructions.               Return in about 4 weeks (around 4/6/2020) for Routine Visit.    Jose Escobar MD  [facility]

## 2020-03-09 NOTE — PATIENT INSTRUCTIONS
Patient Education     Treating Anxiety Disorders with Therapy    If you have an anxiety disorder, you don t have to suffer anymore. Treatment is available. Therapy (also called counseling) is often a helpful treatment for anxiety disorders. With therapy, a specially trained professional (therapist) helps you face and learn to manage your anxiety. Therapy can be short-term or long-term depending on your needs. In some cases, medicine may also be prescribed with therapy. It may take time before you notice how much therapy is helping, but stick with it. With therapy, you can feel better.  Cognitive behavioral therapy (CBT)  Cognitive behavioral therapy (CBT) teaches you to manage anxiety. It does this by helping you understand how you think and act when you re anxious. Research has shown CBT to be a very effective treatment for anxiety disorders. How CBT is run is almost like a class. It involves homework and activities to build skills that teach you to cope with anxiety step by step. It can be done in a group or one-on-one, and often takes place for a set number of sessions. CBT has two main parts:    Cognitive therapy helps you identify the negative, irrational thoughts that occur with your anxiety. You ll learn to replace these with more positive, realistic thoughts.    Behavioral therapy helps you change how you react to anxiety. You ll learn coping skills and methods for relaxing to help you better deal with anxiety.  Other forms of therapy  Other therapy methods may work better for you than CBT. Or, you may move from CBT to another form of therapy as your treatment needs change. This may mean meeting with a therapist by yourself or in a group. Therapy can also help you work through problems in your life, such as drug or alcohol dependence, that may be making your anxiety worse.  Getting better takes time  Therapy will help you feel better and teach you skills to help manage anxiety long term. But change doesn t  happen right away. It takes a commitment from you. And treatment only works if you learn to face the causes of your anxiety. So, you might feel worse before you feel better. This can sometimes make it hard to stick with it. But remember: Therapy is a very effective treatment. The results will be well worth it.  Helping yourself  If anxiety is wearing you down, here are some things you can do to cope:    Check with your doctor and rule out any physical problems that may be causing the anxiety symptoms.    If an anxiety disorder is diagnosed, seek mental healthcare. This is an illness and it can respond to treatment. Most types of anxiety disorders will respond to talk therapy and medicine.    Educate yourself about anxiety disorders. Keep track of helpful online resources and books you can use during stressful periods.    Try stress management techniques such as meditation.    Consider online or in-person support groups.    Don t fight your feelings. Anxiety feeds itself. The more you worry about it, the worse it gets. Instead, try to identify what might have triggered your anxiety. Then try to put this threat in perspective.    Keep in mind that you can t control everything about a situation. Change what you can and let the rest take its course.    Exercise -- it s a great way to relieve tension and help your body feel relaxed.    Examine your life for stress, and try to find ways to reduce it.    Avoid caffeine and nicotine, which can make anxiety symptoms worse.    Fight the temptation to turn to alcohol or unprescribed drugs for relief. They only make things worse in the long run.   Date Last Reviewed: 1/1/2017 2000-2019 [employer]. [address] [place] [address], [place] [address]. All rights reserved. This information is not intended as a substitute for professional medical care. Always follow your healthcare professional's instructions.

## 2020-03-10 ASSESSMENT — ANXIETY QUESTIONNAIRES: GAD7 TOTAL SCORE: 15

## 2020-03-17 ENCOUNTER — MYC MEDICAL ADVICE (OUTPATIENT)
Dept: FAMILY MEDICINE | Facility: CLINIC | Age: 35
End: 2020-03-17

## 2020-03-18 NOTE — TELEPHONE ENCOUNTER
Yes she can stop the Paxil as there is a potential side effect of diarrhea and if she is experiencing the diarrhea it likely is from the medication

## 2020-03-18 NOTE — TELEPHONE ENCOUNTER
Please see mychart regarding patient stopping her paroxetine 10 mg daily due to s/e of diarrhea.    Routing to provider.  Heena BAIN RN

## 2020-03-19 ENCOUNTER — MYC MEDICAL ADVICE (OUTPATIENT)
Dept: FAMILY MEDICINE | Facility: CLINIC | Age: 35
End: 2020-03-19

## 2020-03-19 NOTE — TELEPHONE ENCOUNTER
Patient sends message to provider:  Good Morning,     I have decided to continue my  anxiety medication and stop taking the new vitamins that I purchased to see if maybe the diarrhea is being caused by them.  I didn't even think about it until my  mentioned the new brand of vitamins I bought last week to me last night. Stopping vitamins is a lot less work then having to restart a new anxiety medication.       I will take the weekend to cut them out and see what happens.  I will message you Monday.  I am sorry for the back and forth.  I just want to make sure that I am doing the right thing.  This new medication for my anxiety is extremely important to me and I would hate to have to start over if I don't have to.      Thanks for all your support! I know you are all very busy!!!      Mary Alice GONZALEZ to provider.     Close  Encounter unless further action is needed.

## 2020-03-19 NOTE — TELEPHONE ENCOUNTER
Message noted.if her diarrhea persists though then it may be the anxiety medication because it also has the potential of causing diarrhea.

## 2020-03-23 ENCOUNTER — MYC MEDICAL ADVICE (OUTPATIENT)
Dept: FAMILY MEDICINE | Facility: CLINIC | Age: 35
End: 2020-03-23

## 2020-03-23 DIAGNOSIS — F41.9 ANXIETY: Primary | ICD-10-CM

## 2020-03-23 NOTE — TELEPHONE ENCOUNTER
Per 3/1920 [employer] [place] message:    3/19/20 11:41 AM   Jose Escobar MD routed this conversation to [facility] Provider Jose Suárez MD           3/19/20 11:41 AM   Note      Message noted.if her diarrhea persists though then it may be the anxiety medication because it also has the potential of causing diarrhea.             Provider please review and advise. Thank you.

## 2020-03-24 RX ORDER — ESCITALOPRAM OXALATE 10 MG/1
10 TABLET ORAL DAILY
Qty: 30 TABLET | Refills: 1 | Status: SHIPPED | OUTPATIENT
Start: 2020-03-24 | End: 2020-11-05

## 2020-04-21 ENCOUNTER — MYC MEDICAL ADVICE (OUTPATIENT)
Dept: FAMILY MEDICINE | Facility: CLINIC | Age: 35
End: 2020-04-21

## 2020-04-22 ENCOUNTER — VIRTUAL VISIT (OUTPATIENT)
Dept: FAMILY MEDICINE | Facility: CLINIC | Age: 35
End: 2020-04-22
Payer: COMMERCIAL

## 2020-04-22 DIAGNOSIS — F41.9 ANXIETY: Primary | ICD-10-CM

## 2020-04-22 PROCEDURE — 99213 OFFICE O/P EST LOW 20 MIN: CPT | Mod: 95 | Performed by: FAMILY MEDICINE

## 2020-04-22 RX ORDER — ESCITALOPRAM OXALATE 20 MG/1
20 TABLET ORAL DAILY
Qty: 90 TABLET | Refills: 0 | Status: SHIPPED | OUTPATIENT
Start: 2020-04-22 | End: 2020-08-04

## 2020-04-22 ASSESSMENT — ANXIETY QUESTIONNAIRES
5. BEING SO RESTLESS THAT IT IS HARD TO SIT STILL: SEVERAL DAYS
1. FEELING NERVOUS, ANXIOUS, OR ON EDGE: SEVERAL DAYS
7. FEELING AFRAID AS IF SOMETHING AWFUL MIGHT HAPPEN: NEARLY EVERY DAY
3. WORRYING TOO MUCH ABOUT DIFFERENT THINGS: NEARLY EVERY DAY
6. BECOMING EASILY ANNOYED OR IRRITABLE: NOT AT ALL
2. NOT BEING ABLE TO STOP OR CONTROL WORRYING: NEARLY EVERY DAY
GAD7 TOTAL SCORE: 12

## 2020-04-22 ASSESSMENT — PATIENT HEALTH QUESTIONNAIRE - PHQ9
5. POOR APPETITE OR OVEREATING: SEVERAL DAYS
SUM OF ALL RESPONSES TO PHQ QUESTIONS 1-9: 0

## 2020-04-22 NOTE — PROGRESS NOTES
"Mary Alice Damon is a 34 year old female who is being evaluated via a billable telephone visit.        34 yr old female here for recheck of anxiety. She started taking Lexapro about a month ago. She says she has noticed a huge difference that she is still having panic attacks. She is doing better coping with them so she is not sure if this is the medication helping her to work out her anxiety attacks. She is on 10 mg. We discussed if she will like to increase the dose and then take the medication for another six weeks. She denies any side effects thus far.    The patient has been notified of following:     \"This telephone visit will be conducted via a call between you and your physician/provider. We have found that certain health care needs can be provided without the need for a physical exam.  This service lets us provide the care you need with a short phone conversation.  If a prescription is necessary we can send it directly to your pharmacy.  If lab work is needed we can place an order for that and you can then stop by our lab to have the test done at a later time.    Telephone visits are billed at different rates depending on your insurance coverage. During this emergency period, for some insurers they may be billed the same as an in-person visit.  Please reach out to your insurance provider with any questions.    If during the course of the call the physician/provider feels a telephone visit is not appropriate, you will not be charged for this service.\"    Patient has given verbal consent for Telephone visit?  Yes    How would you like to obtain your AVS? Jessyhart    Subjective     Mary Alice Damon is a 34 year old female who presents to clinic today for the following health issues:    HPI  Anxiety Follow-Up    How are you doing with your anxiety since your last visit?   Improved with the panic attacks but with the anxiety there has been no change with the lexapro     Are you having other symptoms that might be " associated with anxiety? Yes:  panic attacks and palpatation    Have you had a significant life event? No     Are you feeling depressed? No    Do you have any concerns with your use of alcohol or other drugs? No    Social History     Tobacco Use     Smoking status: Former Smoker     Packs/day: 0.00     Smokeless tobacco: Never Used   Substance Use Topics     Alcohol use: Yes     Alcohol/week: 0.0 standard drinks     Comment: rare     Drug use: No     ROBERT-7 SCORE 3/4/2020 3/9/2020 4/22/2020   Total Score 14 15 12     PHQ 3/4/2020 3/9/2020 4/22/2020   PHQ-9 Total Score 2 4 0   Q9: Thoughts of better off dead/self-harm past 2 weeks Not at all Not at all Not at all     Last PHQ-9 4/22/2020   1.  Little interest or pleasure in doing things 0   2.  Feeling down, depressed, or hopeless 0   3.  Trouble falling or staying asleep, or sleeping too much 0   4.  Feeling tired or having little energy 0   5.  Poor appetite or overeating 0   6.  Feeling bad about yourself 0   7.  Trouble concentrating 0   8.  Moving slowly or restless 0   Q9: Thoughts of better off dead/self-harm past 2 weeks 0   PHQ-9 Total Score 0   Difficulty at work, home, or with people -     ROBERT-7  4/22/2020   1. Feeling nervous, anxious, or on edge 1   2. Not being able to stop or control worrying 3   3. Worrying too much about different things 3   4. Trouble relaxing 1   5. Being so restless that it is hard to sit still 1   6. Becoming easily annoyed or irritable 0   7. Feeling afraid, as if something awful might happen 3   ROBERT-7 Total Score 12   If you checked any problems, how difficult have they made it for you to do your work, take care of things at home, or get along with other people? -         How many servings of fruits and vegetables do you eat daily?  2-3    On average, how many sweetened beverages do you drink each day (Examples: soda, juice, sweet tea, etc.  Do NOT count diet or artificially sweetened beverages)?   1    How many days per week do  you exercise enough to make your heart beat faster? 6    How many minutes a day do you exercise enough to make your heart beat faster? 60 or more    How many days per week do you miss taking your medication? 0            Medication Followup of lexapro is working for the panic attacks but does not seem to b helping for the anxiety     Taking Medication as prescribed: yes    Side Effects:  She is not having any side affect like she was having with the Paxil     Medication Helping Symptoms:  Yes and no yes for panic attacks but no for anxiety        Patient Active Problem List   Diagnosis     Morbidly obese (H)     Female infertility, secondary     Elevated liver enzymes     Type 2 diabetes mellitus without complications (H)     Irregular menses     Anxiety     Past Surgical History:   Procedure Laterality Date     TONSILLECTOMY  6th grade       Social History     Tobacco Use     Smoking status: Former Smoker     Packs/day: 0.00     Smokeless tobacco: Never Used   Substance Use Topics     Alcohol use: Yes     Alcohol/week: 0.0 standard drinks     Comment: rare     Family History   Problem Relation Age of Onset     Heart Disease Father      Diabetes Father          Current Outpatient Medications   Medication Sig Dispense Refill     escitalopram (LEXAPRO) 10 MG tablet Take 1 tablet (10 mg) by mouth daily 30 tablet 1     escitalopram (LEXAPRO) 20 MG tablet Take 1 tablet (20 mg) by mouth daily 90 tablet 0     LORazepam (ATIVAN) 0.5 MG tablet Take 1 tablet (0.5 mg) by mouth every 8 hours as needed (at start of panic attacks) 30 tablet 0     No Known Allergies  BP Readings from Last 3 Encounters:   03/09/20 136/88   03/04/20 132/80   10/23/19 138/88    Wt Readings from Last 3 Encounters:   03/09/20 122.5 kg (270 lb)   03/04/20 121.7 kg (268 lb 4 oz)   10/23/19 125.8 kg (277 lb 6.4 oz)                    Reviewed and updated as needed this visit by Provider  Tobacco  Allergies  Meds  Problems  Med Hx  Surg Hx  Fam Hx          Review of Systems   ROS COMP: Constitutional, HEENT, cardiovascular, pulmonary, gi and gu systems are negative, except as otherwise noted.       Objective   Reported vitals:  There were no vitals taken for this visit.   healthy, alert and no distress  PSYCH: Alert and oriented times 3; coherent speech, normal   rate and volume, able to articulate logical thoughts, able   to abstract reason, no tangential thoughts, no hallucinations   or delusions  Her affect is normal  RESP: No cough, no audible wheezing, able to talk in full sentences  Remainder of exam unable to be completed due to telephone visits    Diagnostic Test Results:  none         Assessment/Plan:  1. Anxiety  Medication refilled.we will recheck her symptoms in six weeks.   - escitalopram (LEXAPRO) 20 MG tablet; Take 1 tablet (20 mg) by mouth daily  Dispense: 90 tablet; Refill: 0    Return in about 6 weeks (around 6/3/2020) for Routine Visit.      Phone call duration:  5-10 minutes    Jose Escobar MD

## 2020-04-23 ASSESSMENT — ANXIETY QUESTIONNAIRES: GAD7 TOTAL SCORE: 12

## 2020-06-17 ENCOUNTER — MYC MEDICAL ADVICE (OUTPATIENT)
Dept: FAMILY MEDICINE | Facility: CLINIC | Age: 35
End: 2020-06-17

## 2020-08-02 DIAGNOSIS — F41.9 ANXIETY: ICD-10-CM

## 2020-08-04 RX ORDER — ESCITALOPRAM OXALATE 20 MG/1
TABLET ORAL
Qty: 90 TABLET | Refills: 0 | Status: SHIPPED | OUTPATIENT
Start: 2020-08-04 | End: 2020-12-04

## 2020-11-05 ENCOUNTER — VIRTUAL VISIT (OUTPATIENT)
Dept: FAMILY MEDICINE | Facility: CLINIC | Age: 35
End: 2020-11-05
Payer: COMMERCIAL

## 2020-11-05 DIAGNOSIS — F41.0 PANIC ATTACKS: ICD-10-CM

## 2020-11-05 PROCEDURE — 99213 OFFICE O/P EST LOW 20 MIN: CPT | Mod: 95 | Performed by: FAMILY MEDICINE

## 2020-11-05 RX ORDER — LORAZEPAM 0.5 MG/1
0.25 TABLET ORAL DAILY PRN
Qty: 30 TABLET | Refills: 0 | Status: SHIPPED | OUTPATIENT
Start: 2020-11-05 | End: 2021-02-17

## 2020-11-05 ASSESSMENT — ANXIETY QUESTIONNAIRES
GAD7 TOTAL SCORE: 12
1. FEELING NERVOUS, ANXIOUS, OR ON EDGE: NEARLY EVERY DAY
2. NOT BEING ABLE TO STOP OR CONTROL WORRYING: NEARLY EVERY DAY
3. WORRYING TOO MUCH ABOUT DIFFERENT THINGS: NEARLY EVERY DAY
6. BECOMING EASILY ANNOYED OR IRRITABLE: SEVERAL DAYS
IF YOU CHECKED OFF ANY PROBLEMS ON THIS QUESTIONNAIRE, HOW DIFFICULT HAVE THESE PROBLEMS MADE IT FOR YOU TO DO YOUR WORK, TAKE CARE OF THINGS AT HOME, OR GET ALONG WITH OTHER PEOPLE: SOMEWHAT DIFFICULT
5. BEING SO RESTLESS THAT IT IS HARD TO SIT STILL: NOT AT ALL
7. FEELING AFRAID AS IF SOMETHING AWFUL MIGHT HAPPEN: SEVERAL DAYS

## 2020-11-05 ASSESSMENT — PATIENT HEALTH QUESTIONNAIRE - PHQ9
5. POOR APPETITE OR OVEREATING: SEVERAL DAYS
SUM OF ALL RESPONSES TO PHQ QUESTIONS 1-9: 1

## 2020-11-05 NOTE — PROGRESS NOTES
"Mary Alice Damon is a 35 year old female who is being evaluated via a billable video visit.      The patient has been notified of following:     \"This video visit will be conducted via a call between you and your physician/provider. We have found that certain health care needs can be provided without the need for an in-person physical exam.  This service lets us provide the care you need with a video conversation.  If a prescription is necessary we can send it directly to your pharmacy.  If lab work is needed we can place an order for that and you can then stop by our lab to have the test done at a later time.    Video visits are billed at different rates depending on your insurance coverage.  Please reach out to your insurance provider with any questions.    If during the course of the call the physician/provider feels a video visit is not appropriate, you will not be charged for this service.\"    Patient has given verbal consent for Video visit? Yes  How would you like to obtain your AVS? MyChart  If you are dropped from the video visit, the video invite should be resent to: Text to cell phone: [phone]  Will anyone else be joining your video visit? No    Subjective     Mary Alice Damon is a 35 year old female who presents today via video visit for the following health issues:    HPI   Patient is a 35-year-old female who presents to clinic for concerns of anxiety.  She has had longstanding anxiety  and reports that lately she has been having more panic attacks due to recent life stressors.  She recently moved and states that this was partly why she is having more panic attacks she is also overwhelmed with work.  She says that the escitalopram has worked really well for her and she has not had any side effects she was wondering about increasing the dose.  She takes lorazepam as needed but has not done that in a long time.  We talked about using that on as as-needed basis and she is willing to try.  Increasing the " doseof the escitalopram may not benefit her.   She is also open to counseling so we will put a referral in for her.  Overall she appears to be doing well had no other acute symptoms today.  Anxiety Follow-Up    How are you doing with your anxiety since your last visit? Having increased Anxiety and wants to discuss increase in dose or change in medication    Are you having other symptoms that might be associated with anxiety? Yes:  chest, palpitions, shortness of breath    Have you had a significant life event? Job Concerns     Are you feeling depressed? No    Do you have any concerns with your use of alcohol or other drugs? No    Social History     Tobacco Use     Smoking status: Former Smoker     Packs/day: 0.00     Smokeless tobacco: Never Used   Substance Use Topics     Alcohol use: Yes     Alcohol/week: 0.0 standard drinks     Comment: rare     Drug use: No     ROBERT-7 SCORE 3/9/2020 4/22/2020 11/5/2020   Total Score 15 12 12     PHQ 3/9/2020 4/22/2020 11/5/2020   PHQ-9 Total Score 4 0 1   Q9: Thoughts of better off dead/self-harm past 2 weeks Not at all Not at all Not at all     Last PHQ-9 11/5/2020   1.  Little interest or pleasure in doing things 0   2.  Feeling down, depressed, or hopeless 0   3.  Trouble falling or staying asleep, or sleeping too much 0   4.  Feeling tired or having little energy 0   5.  Poor appetite or overeating 0   6.  Feeling bad about yourself 0   7.  Trouble concentrating 1   8.  Moving slowly or restless 0   Q9: Thoughts of better off dead/self-harm past 2 weeks 0   PHQ-9 Total Score 1   Difficulty at work, home, or with people Somewhat difficult     ROBERT-7  11/5/2020   1. Feeling nervous, anxious, or on edge 3   2. Not being able to stop or control worrying 3   3. Worrying too much about different things 3   4. Trouble relaxing 1   5. Being so restless that it is hard to sit still 0   6. Becoming easily annoyed or irritable 1   7. Feeling afraid, as if something awful might happen 1    ROBERT-7 Total Score 12   If you checked any problems, how difficult have they made it for you to do your work, take care of things at home, or get along with other people? Somewhat difficult         How many servings of fruits and vegetables do you eat daily?  2-3    On average, how many sweetened beverages do you drink each day (Examples: soda, juice, sweet tea, etc.  Do NOT count diet or artificially sweetened beverages)?   0    How many days per week do you exercise enough to make your heart beat faster? 7    How many minutes a day do you exercise enough to make your heart beat faster? 30 - 60    How many days per week do you miss taking your medication? 0         Video Start Time: 1:08 PM        Review of Systems   Constitutional, HEENT, cardiovascular, pulmonary, gi and gu systems are negative, except as otherwise noted.      Objective           Vitals:  No vitals were obtained today due to virtual visit.    Physical Exam     GENERAL: Healthy, alert and no distress  EYES: Eyes grossly normal to inspection.  No discharge or erythema, or obvious scleral/conjunctival abnormalities.  RESP: No audible wheeze, cough, or visible cyanosis.  No visible retractions or increased work of breathing.    SKIN: Visible skin clear. No significant rash, abnormal pigmentation or lesions.  PSYCH: Mentation appears normal, affect normal/bright, judgement and insight intact, normal speech and appearance well-groomed.      No results found for this or any previous visit (from the past 24 hour(s)).        Assessment & Plan     Panic attacks  Asked to take a half tab as needed,   - LORazepam (ATIVAN) 0.5 MG tablet; Take 0.5 tablets (0.25 mg) by mouth daily as needed (at start of panic attacks)              FUTURE APPOINTMENTS:       - Follow-up visit in one month or sooner as needed.    Return in about 4 weeks (around 12/3/2020) for Follow up.    Jose Escobar MD  [facility]      Video-Visit  Details    Type of service:  Video Visit    Video End Time:1:16 PM    Originating Location (pt. Location): Home    Distant Location (provider location):  [facility]     Platform used for Video Visit: [employer]

## 2020-11-06 ASSESSMENT — ANXIETY QUESTIONNAIRES: GAD7 TOTAL SCORE: 12

## 2020-11-16 ENCOUNTER — HEALTH MAINTENANCE LETTER (OUTPATIENT)
Age: 35
End: 2020-11-16

## 2020-12-02 ENCOUNTER — VIRTUAL VISIT (OUTPATIENT)
Dept: FAMILY MEDICINE | Facility: OTHER | Age: 35
End: 2020-12-02

## 2020-12-03 DIAGNOSIS — Z20.822 ENCOUNTER FOR LABORATORY TESTING FOR COVID-19 VIRUS: Primary | ICD-10-CM

## 2020-12-03 PROCEDURE — U0003 INFECTIOUS AGENT DETECTION BY NUCLEIC ACID (DNA OR RNA); SEVERE ACUTE RESPIRATORY SYNDROME CORONAVIRUS 2 (SARS-COV-2) (CORONAVIRUS DISEASE [COVID-19]), AMPLIFIED PROBE TECHNIQUE, MAKING USE OF HIGH THROUGHPUT TECHNOLOGIES AS DESCRIBED BY CMS-2020-01-R: HCPCS | Performed by: FAMILY MEDICINE

## 2020-12-03 NOTE — PROGRESS NOTES
"Date: 2020 20:50:30  Clinician: Shaka Pardo  Clinician NPI: [id number]  Patient: Mary Alice Jordan  Patient : 1985  Patient Address: [address] [place] [address]  Patient Phone: [phone]  Visit Protocol: URI  Patient Summary:  Mary Alice is a 35 year old ( : 1985 ) female who initiated a [facility] Visit for COVID-19 (Coronavirus) evaluation and screening. When asked the question \"Please sign me up to receive news, health information and promotions from [facility].\", Mary Alice responded \"Yes\".    When asked when her symptoms started, Mary Alice reported that she does not have any symptoms.   She denies taking antibiotic medication in the past month and having recent facial or sinus surgery in the past 60 days.    Pertinent COVID-19 (Coronavirus) information  Mary Alice does not work or volunteer as healthcare worker or a . In the past 14 days, Mary Alice has not worked or volunteered at a healthcare facility or group living setting.   In the past 14 days, she also has not lived in a congregate living setting.   Mary Alice has had a close contact with a laboratory-confirmed COVID-19 patient in the last 14 days. She was exposed at her work. 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   Mary Alice is not living in the same household with the COVID-19 positive patient. She was in an enclosed space for greater than 15 minutes with the COVID-19 patient.   During the encounter, only she was wearing a mask.   Since 2019, Mary Alice has not been tested for COVID-19 and has not had upper respiratory infection or influenza-like illness.   Pertinent medical history  Mary Alice has diabetes. She is not sure if her diabetes is in control.   She has not been " told by her provider to avoid NSAIDs.   Mary Alice does not get yeast infections when she takes antibiotics.   She denies having immunosuppressive conditions (e.g., chemotherapy, HIV, organ transplant, long-term use of steroids or other immunosuppressive medications, splenectomy). She does not have severe COPD and congestive heart failure. She does not have asthma.   Mary Alice needs a return to work/school note.   Weight: 265 lbs   Mary Alice does not smoke or use smokeless tobacco.   She denies pregnancy and denies breastfeeding. She has menstruated in the past month.   Weight: 265 lbs    MEDICATIONS: escitalopram oxalate oral, ALLERGIES: NKDA  Clinician Response:  Dear Mary Alice,   Based on your exposure to COVID-19 (coronavirus), we would like to test you for this virus.  1. Please call [phone] to schedule your visit. Explain that you were referred by [facility] to have a COVID-19 test. Be ready to share your [facility] visit ID number.  * If you need to schedule in [facility] please call [phone] or for [employer] employees please call [phone].   * If you need to schedule in the [place] area please call [phone]. [employer] employees call [phone].   The following will serve as your written order for this COVID Test, ordered by me, for the indication of suspected COVID [Z20.828]: The test will be ordered in [employer], our electronic health record, after you are scheduled. It will show as ordered and authorized by Kelvin Hunter MD.  Order: COVID-19 (coronavirus) PCR for ASYMPTOMATIC EXPOSURE testing from [facility].   If you know you have had close contact with someone who tested positive, you should be quarantined for 14 days after this exposure. You should stay in quarantine for the14 days even if the covid test is negative, the optimal time to test after exposure is 5-7 days from the exposure  Quarantine means   What should I do?  For safety, it's very important to follow these rules. Do this for 14 days after the date you  were last exposed to the virus..  Stay home and away from others. Don't go to school or anywhere else. Generally quarantine means staying home from work but there are some exceptions to this. Please contact your workplace.   No hugging, kissing or shaking hands.  Don't let anyone visit.  Cover your mouth and nose with a mask, tissue or washcloth to avoid spreading germs.  Wash your hands and face often. Use soap and water.  What are the symptoms of COVID-19?  The most common symptoms are cough, fever and trouble breathing. Less common symptoms include headache, body aches, fatigue (feeling very tired), chills, sore throat, stuffy or runny nose, diarrhea (loose poop), loss of taste or smell, belly pain, and nausea or vomiting (feeling sick to your stomach or throwing up).  After 14 days, if you have still don't have symptoms, you likely don't have this virus.  If you develop symptoms, follow these guidelines.  If you're normally healthy: Please start another [facility] visit to report your symptoms. Go to [email or web address].  If you have a serious health problem (like cancer, heart failure, an organ transplant or kidney disease): Call your specialty clinic. Let them know that you might have COVID-19.  2. When it's time for your COVID test:  Stay at least 6 feet away from others. (If someone will drive you to your test, stay in the backseat, as far away from the  as you can.)  Cover your mouth and nose with a mask, tissue or washcloth.  Go straight to the testing site. Don't make any stops on the way there or back.  Please note  Caregivers in these groups are at risk for severe illness due to COVID-19:  o People 65 years and older  o People who live in a nursing home or long-term care facility  o People with chronic disease (lung, heart, cancer, diabetes, kidney, liver, immunologic)  o People who have a weakened immune system, including those who:  Are in cancer treatment  Take medicine that weakens the immune system, such  as corticosteroids  Had a bone marrow or organ transplant  Have an immune deficiency  Have poorly controlled HIV or AIDS  Are obese (body mass index of 40 or higher)  Smoke regularly  Where can I get more information?  [facility] -- About COVID-19: [email or web address]  CDC -- What to Do If You're Sick: www.cdc.gov/coronavirus/2019-ncov/about/steps-when-sick.html  CDC -- Ending Home Isolation: www.cdc.gov/coronavirus/2019-ncov/hcp/disposition-in-home-patients.html  [facility] -- Caring for Someone: www.cdc.gov/coronavirus/2019-ncov/if-you-are-sick/care-for-someone.html  [facility] -- Interim Guidance for Hospital Discharge to Home: www.[facility].mn./diseases/coronavirus/hcp/hospdischarge.pdf  [facility] clinical trials (COVID-19 research studies): clinicalaffairs.[facility]-clinical-trials  Below are the COVID-19 hotlines at the [facility] ([facility]). Interpreters are available.  For health questions: Call 995-348-3211 or 1-178.733.5597 (7 a.m. to 7 p.m.)  For questions about schools and childcare: Call 482-221-2824 or 1-120.384.4003 (7 a.m. to 7 p.m.)    Diagnosis: Contact with and (suspected) exposure to other viral communicable diseases  Diagnosis ICD: Z20.828

## 2020-12-04 ENCOUNTER — MYC REFILL (OUTPATIENT)
Dept: FAMILY MEDICINE | Facility: CLINIC | Age: 35
End: 2020-12-04

## 2020-12-04 DIAGNOSIS — F41.9 ANXIETY: ICD-10-CM

## 2020-12-04 LAB
SARS-COV-2 RNA SPEC QL NAA+PROBE: NOT DETECTED
SPECIMEN SOURCE: NORMAL

## 2020-12-07 RX ORDER — ESCITALOPRAM OXALATE 20 MG/1
20 TABLET ORAL DAILY
Qty: 30 TABLET | Refills: 0 | Status: SHIPPED | OUTPATIENT
Start: 2020-12-07 | End: 2021-10-08

## 2020-12-07 NOTE — TELEPHONE ENCOUNTER
"Medication is being filled for 1 time refill only due to:  Patient needs to be seen because due for follow-up. [employer] message sent..    Requested Prescriptions   Pending Prescriptions Disp Refills     escitalopram (LEXAPRO) 20 MG tablet 90 tablet 0     Sig: Take 1 tablet (20 mg) by mouth daily       SSRIs Protocol Passed - 12/4/2020  7:02 AM        Passed - Recent (12 mo) or future (30 days) visit within the authorizing provider's specialty     Patient has had an office visit with the authorizing provider or a provider within the authorizing providers department within the previous 12 mos or has a future within next 30 days. See \"Patient Info\" tab in inbasket, or \"Choose Columns\" in Meds & Orders section of the refill encounter.              Passed - Medication is active on med list        Passed - Patient is age 18 or older        Passed - No active pregnancy on record        Passed - No positive pregnancy test in last 12 months           Nydia GARCIA RN, BSN      "

## 2020-12-29 ENCOUNTER — NURSE TRIAGE (OUTPATIENT)
Dept: NURSING | Facility: CLINIC | Age: 35
End: 2020-12-29

## 2020-12-30 ENCOUNTER — OFFICE VISIT (OUTPATIENT)
Dept: FAMILY MEDICINE | Facility: CLINIC | Age: 35
End: 2020-12-30
Payer: COMMERCIAL

## 2020-12-30 ENCOUNTER — ANCILLARY PROCEDURE (OUTPATIENT)
Dept: GENERAL RADIOLOGY | Facility: CLINIC | Age: 35
End: 2020-12-30
Attending: FAMILY MEDICINE
Payer: COMMERCIAL

## 2020-12-30 VITALS
HEIGHT: 65 IN | WEIGHT: 277 LBS | RESPIRATION RATE: 18 BRPM | SYSTOLIC BLOOD PRESSURE: 132 MMHG | DIASTOLIC BLOOD PRESSURE: 84 MMHG | HEART RATE: 90 BPM | BODY MASS INDEX: 46.15 KG/M2 | TEMPERATURE: 98.3 F | OXYGEN SATURATION: 98 %

## 2020-12-30 DIAGNOSIS — S93.402A SPRAIN OF LEFT ANKLE, UNSPECIFIED LIGAMENT, INITIAL ENCOUNTER: ICD-10-CM

## 2020-12-30 DIAGNOSIS — M25.572 PAIN IN JOINT, ANKLE AND FOOT, LEFT: Primary | ICD-10-CM

## 2020-12-30 DIAGNOSIS — M25.572 PAIN IN JOINT, ANKLE AND FOOT, LEFT: ICD-10-CM

## 2020-12-30 PROCEDURE — 99213 OFFICE O/P EST LOW 20 MIN: CPT | Performed by: FAMILY MEDICINE

## 2020-12-30 PROCEDURE — 73610 X-RAY EXAM OF ANKLE: CPT | Mod: LT | Performed by: RADIOLOGY

## 2020-12-30 ASSESSMENT — PAIN SCALES - GENERAL: PAINLEVEL: SEVERE PAIN (7)

## 2020-12-30 ASSESSMENT — MIFFLIN-ST. JEOR: SCORE: 1934.4

## 2020-12-30 NOTE — TELEPHONE ENCOUNTER
She is calling because she fell/ mis stepped off a ladder about 4 hours ago and her ankle is swollen, black and blue under her ankle bone and it is very painful to walk on. She will go to [place] urgent care as soon as her  gets home he will take her in.     Gail Arreola RN/ [facility]        Reason for Disposition    Can't stand (bear weight) or walk    Additional Information    Followed an ankle injury    Negative: Serious injury with multiple fractures    Negative: [1] Major bleeding (e.g., actively dripping or spurting) AND [2] can't be stopped    Negative: Amputation    Negative: Looks like a dislocated joint (very crooked or deformed)    Negative: Sounds like a life-threatening emergency to the triager    Negative: [1] Dirt in the wound AND [2] not removed with 15 minutes of scrubbing    Negative: [1] Bleeding AND [2] won't stop after 10 minutes of direct pressure (using correct technique)    Negative: Skin is split open or gaping (or length > 1/2 inch or 12 mm)    Negative: Bullet wound, stabbed by knife, or other serious penetrating wound    Protocols used: FOOT AND ANKLE INJURY-A-AH, ANKLE PAIN-A-AH

## 2020-12-30 NOTE — PATIENT INSTRUCTIONS
Patient Education      *SPRAIN:ANKLE [w/ x-ray]    A sprain is an injury to the ligaments or capsule that holds a joint together. There are no broken bones. Most sprains take from four to six weeks to heal. If the ligament is completely torn (severe sprain), it can take several months to recover.  Mild to Moderate sprains may be treated with an elastic wrap or an in-shoe splint to provide support and prevent re-injury. A mild sprain may not require any additional support. A severe sprain may require surgery to repair, but this is rare.  HOME CARE:  1. Stay off the injured leg as much as possible until you can walk on it without pain. If you have a lot of pain with walking, crutches or a walker may be prescribed. (These can be rented or purchased at many pharmacies and surgical or orthopedic supply stores). Follow your doctor's advice regarding when to begin bearing weight on that leg.  2. Keep your leg elevated to reduce pain and swelling. When sleeping, place a pillow under the injured leg. When sitting, support the injured leg so it is level with your waist. This is very important during the first 48 hours.  3. Apply an ice pack (ice cubes in a plastic bag, wrapped in a towel) over the injured area for 20 minutes every 1-2 hours the first day. You can place the ice pack directly over the splint/cast. If you were given a boot, open it to apply the ice pack. Continue with ice packs 3-4 times a day for the next two days, then as needed for the relief of pain and swelling.  4. You may use acetaminophen (Tylenol) 650-1000 mg every 6 hours or ibuprofen (Motrin, Advil) 600 mg every 6-8 hours with food to control pain, if you are able to take these medicines. [NOTE: If you have chronic liver or kidney disease or ever had a stomach ulcer or GI bleeding, talk with your doctor before using these medicines.]  5. You may return to sports after healing, when you can run without pain.  6. A sprained ankle is at risk for  re-injury during the first six weeks. During that time, protect your ankle with an in-shoe splint that prevents tilting of your ankle from side to side. This is very important if you do active work or play sports during that time.  FOLLOW UP with your doctor, or as advised, if you are not starting to improve within the next five days.     GET PROMPT MEDICAL ATTENTION if any of the following occur:    The plaster cast or splint gets wet or soft    The fiberglass cast or splint gets wet and does not dry for 24 hours    Pain or swelling increases, or redness appears    Toes become cold, blue, numb or tingly    [id number] [facility], [address] [place] [address], [place] [address]. All rights reserved. This information is not intended as a substitute for professional medical care. Always follow your healthcare professional's instructions.

## 2020-12-30 NOTE — NURSING NOTE
"Initial /84   Pulse 90   Temp 98.3  F (36.8  C) (Tympanic)   Resp 18   Ht 1.638 m (5' 4.5\")   Wt 125.6 kg (277 lb)   SpO2 98%   BMI 46.81 kg/m   Estimated body mass index is 46.81 kg/m  as calculated from the following:    Height as of this encounter: 1.638 m (5' 4.5\").    Weight as of this encounter: 125.6 kg (277 lb). .    Vandana Olivares, JENIFER    "

## 2020-12-30 NOTE — PROGRESS NOTES
"Subjective     Mary Alice Damon is a 35 year old female who presents to clinic today for the following health issues:    HPI   Patient is a 35 yr old female here for left ankle injury. She fell off a ladder yesterday and has had pain since then . She reports swelling and inability to bear weight. Pain is sharp in nature and radiates up her leg. Patient denies any numbness or tingling.  She has been applying ice and has taken Ibuprofen and Tylenol.   Concern - ankle injury  Onset: she fell from a ladder yesterday, about 3 feet up  Description: left ankle  Intensity: 7/10  Progression of Symptoms:  same and constant  Accompanying Signs & Symptoms: pain shoots into her calf area when walking  Previous history of similar problem:   Precipitating factors:        Worsened by: walking, standing and any pressure  Alleviating factors:        Improved by: rest  Therapies tried and outcome: ibuprofen, ice and elevation with some results        Review of Systems   Constitutional, HEENT, cardiovascular, pulmonary, gi and gu systems are negative, except as otherwise noted.      Objective    /84   Pulse 90   Temp 98.3  F (36.8  C) (Tympanic)   Resp 18   Ht 1.638 m (5' 4.5\")   Wt 125.6 kg (277 lb)   SpO2 98%   BMI 46.81 kg/m    Body mass index is 46.81 kg/m .  Physical Exam  Constitutional:       Appearance: Normal appearance. She is obese.   HENT:      Head: Normocephalic and atraumatic.   Musculoskeletal:         General: Swelling and tenderness present.      Left ankle: She exhibits decreased range of motion and swelling. She exhibits normal pulse. Tenderness. Lateral malleolus tenderness found. No medial malleolus tenderness found. Achilles tendon exhibits no pain.   Skin:     General: Skin is warm and dry.   Neurological:      Mental Status: She is alert and oriented to person, place, and time.   Psychiatric:         Mood and Affect: Mood normal.         Behavior: Behavior normal.            Xray -     "   IMPRESSION: No evidence of acute fracture or subluxation. Mortise and  talar dome are intact.  Assessment & Plan     Pain in joint, ankle and foot, left  X-rays were negative for fractures.  - XR Ankle Left G/E 3 Views    Sprain of left ankle, unspecified ligament, initial encounter  Patient given a walking boot. Recommend icing and elevation. Patient is to not bear weight for a few days.       FUTURE APPOINTMENTS:       - Follow-up visit in one month or sooner as needed.    Return in about 4 weeks (around 1/27/2021) for Follow up.    Jose Escobar MD  [facility]

## 2020-12-30 NOTE — RESULT ENCOUNTER NOTE
Please inform patient that test result was within normal parameters.   Thank you.     Jose Escobar M.D.

## 2021-01-15 ENCOUNTER — HEALTH MAINTENANCE LETTER (OUTPATIENT)
Age: 36
End: 2021-01-15

## 2021-01-20 ENCOUNTER — VIRTUAL VISIT (OUTPATIENT)
Dept: FAMILY MEDICINE | Facility: OTHER | Age: 36
End: 2021-01-20
Payer: COMMERCIAL

## 2021-01-20 DIAGNOSIS — Z20.822 SUSPECTED COVID-19 VIRUS INFECTION: Primary | ICD-10-CM

## 2021-01-20 PROCEDURE — 99421 OL DIG E/M SVC 5-10 MIN: CPT | Performed by: PREVENTIVE MEDICINE

## 2021-01-20 NOTE — PROGRESS NOTES
"Date: 2021 14:07:28  Clinician: Vick Amaya  Clinician NPI: [id number]  Patient: Mary Alice Jordan  Patient : 1985  Patient Address: [address] [place] [address]  Patient Phone: [phone]  Visit Protocol: URI  Patient Summary:  Mary Alice is a 35 year old ( : 1985 ) female who initiated a [facility] Visit for COVID-19 (Coronavirus) evaluation and screening. When asked the question \"Please sign me up to receive news, health information and promotions from [facility].\", Mary Alice responded \"No\".    Mary Alice states her symptoms started 1-2 days ago.   Her symptoms consist of a headache, myalgia, malaise, and nasal congestion. Mary Alice also feels feverish.   Symptom details     Nasal secretions: The color of her mucus is clear.    Temperature: Her current temperature is 98.5 degrees Fahrenheit.     Headache: She states the headache is moderate (4-6 on a 10 point pain scale).      Mary Alice denies having ear pain, chills, vomiting, rhinitis, cough, nausea, teeth pain, ageusia, diarrhea, wheezing, facial pain or pressure, anosmia, and sore throat. She also denies having recent facial or sinus surgery in the past 60 days, taking antibiotic medication in the past month, and having a sinus infection within the past year. She is not experiencing dyspnea.   Precipitating events  She has not recently been exposed to someone with influenza. Mary Alice has been in close contact with the following high risk individuals: children under the age of 5.   Pertinent COVID-19 (Coronavirus) information  Mary Alice does not work or volunteer as healthcare worker or a . In the past 14 days, Mary Alice has not worked or volunteered at a healthcare facility or group living setting.   In the past 14 days, she also has not lived in a congregate living setting.   Mary Alice has not had a close contact with a laboratory-confirmed COVID-19 patient within 14 days of symptom onset.    Mary Alice has been tested for COVID-19.   "    Date(s) of her COVID-19 test as reported by the patient (free text): 12/3/2020       Result of COVID-19 test as reported by the patient (free text): Negative       Type of test as reported by the patient (free text): Nasal        Mary Alice has not received a COVID-19 vaccine.   Pertinent medical history  Mary Alice has diabetes. She is not sure if her diabetes is in control.   She has not been told by her provider to avoid NSAIDs.   MaryA lice does not get yeast infections when she takes antibiotics.   She denies having immunosuppressive conditions (e.g., chemotherapy, HIV, organ transplant, long-term use of steroids or other immunosuppressive medications, splenectomy). She denies having congestive heart failure and severe COPD. She does not have asthma.   Mary Alice needs a return to work/school note.   Mary Alice does not smoke or use smokeless tobacco.   She denies pregnancy and denies breastfeeding. She has menstruated in the past month.   Weight: 270 lbs    MEDICATIONS: escitalopram oxalate oral, ALLERGIES: NKDA  Clinician Response:  Dear Mary Alice,   Your symptoms show that you may have coronavirus (COVID-19). This illness can cause fever, cough and trouble breathing. Many people get a mild case and get better on their own. Some people can get very sick.  What should I do?  We would like to test you for this virus.   1. Please call [phone] to schedule your visit. Explain that you were referred by [facility] to have a COVID-19 test. Be ready to share your [facility] visit ID number.  * If you need to schedule in [facility] please call [phone] or for [employer] employees please call [phone].  * If you need to schedule in the [place] area please call [phone]. [employer] employees call [phone].  The following will serve as your written order for this COVID Test, ordered by me, for the indication of suspected COVID [Z20.828]: The test will be ordered in [employer], our electronic health record, after you are scheduled. It  "will show as ordered and authorized by Kelvin Hunter MD.  Order: COVID-19 (Coronavirus) PCR for SYMPTOMATIC testing from [facility].   2. When it's time for your COVID test:  Stay at least 6 feet away from others. (If someone will drive you to your test, stay in the backseat, as far away from the  as you can.)   Cover your mouth and nose with a mask, tissue or washcloth.  Go straight to the testing site. Don't make any stops on the way there or back.      3.Starting now: Stay home and away from others (self-isolate) until:   You've had no fever---and no medicine that reduces fever---for one full day (24 hours). And...   Your other symptoms have gotten better. For example, your cough or breathing has improved. And...   At least 10 days have passed since your symptoms started.       During this time, don't leave the house except for testing or medical care.   Stay in your own room, even for meals. Use your own bathroom if you can.   Stay away from others in your home. No hugging, kissing or shaking hands. No visitors.  Don't go to work, school or anywhere else.    Clean \"high touch\" surfaces often (doorknobs, counters, handles, etc.). Use a household cleaning spray or wipes. You'll find a full list of  on the EPA website: www.epa.gov/pesticide-registration/list-n-disinfectants-use-against-sars-cov-2.   Cover your mouth and nose with a mask, tissue or washcloth to avoid spreading germs.  Wash your hands and face often. Use soap and water.  Caregivers in these groups are at risk for severe illness due to COVID-19:  o People 65 years and older  o People who live in a nursing home or long-term care facility  o People with chronic disease (lung, heart, cancer, diabetes, kidney, liver, immunologic)  o People who have a weakened immune system, including those who:   Are in cancer treatment  Take medicine that weakens the immune system, such as corticosteroids  Had a bone marrow or organ transplant  Have an immune " deficiency  Have poorly controlled HIV or AIDS  Are obese (body mass index of 40 or higher)  Smoke regularly   o Caregivers should wear gloves while washing dishes, handling laundry and cleaning bedrooms and bathrooms.  o Use caution when washing and drying laundry: Don't shake dirty laundry, and use the warmest water setting that you can.  o For more tips, go to www.cdc.gov/coronavirus/2019-ncov/downloads/10Things.pdf.    4.Sign up for [employer]. We know it's scary to hear that you might have COVID-19. We want to track your symptoms to make sure you're okay over the next 2 weeks. Please look for an email from [employer]---this is a free, online program that we'll use to keep in touch. To sign up, follow the link in the email. Learn more at [email or web address]  How can I take care of myself?   Get lots of rest. Drink extra fluids (unless a doctor has told you not to).   Take Tylenol (acetaminophen) for fever or pain. If you have liver or kidney problems, ask your family doctor if it's okay to take Tylenol.   Adults can take either:    650 mg (two 325 mg pills) every 4 to 6 hours, or...   1,000 mg (two 500 mg pills) every 8 hours as needed.    Note: Don't take more than 3,000 mg in one day. Acetaminophen is found in many medicines (both prescribed and over-the-counter medicines). Read all labels to be sure you don't take too much.   For children, check the Tylenol bottle for the right dose. The dose is based on the child's age or weight.    If you have other health problems (like cancer, heart failure, an organ transplant or severe kidney disease): Call your specialty clinic if you don't feel better in the next 2 days.       Know when to call 911. Emergency warning signs include:    Trouble breathing or shortness of breath Pain or pressure in the chest that doesn't go away Feeling confused like you haven't felt before, or not being able to wake up Bluish-colored lips or face.  Where can I get more  information?   [facility] -- About COVID-19: [email or web address]   CDC -- What to Do If You're Sick: www.cdc.gov/coronavirus/2019-ncov/about/steps-when-sick.html   [facility] [place] -- Ending Home Isolation: www.cdc.gov/coronavirus/2019-ncov/hcp/disposition-in-home-patients.html   [facility] [place] -- Caring for Someone: www.cdc.gov/coronavirus/2019-ncov/if-you-are-sick/care-for-someone.html   [facility] -- Interim Guidance for Hospital Discharge to Home: www.[facility].[place]./diseases/coronavirus/hcp/hospdischarge.pdf   [facility] clinical trials (COVID-19 research studies): clinicalaffairs.[facility]-clinical-trials    Below are the COVID-19 hotlines at the [place] Department of [facility] ([facility]). Interpreters are available.    For health questions: Call 316-089-0642 or 1-905.494.5279 (7 a.m. to 7 p.m.) For questions about schools and childcare: Call 169-373-8392 or 1-476.266.8795 (7 a.m. to 7 p.m.)    Diagnosis: Contact with and (suspected) exposure to other viral communicable diseases  Diagnosis ICD: Z20.828

## 2021-01-21 DIAGNOSIS — Z20.822 SUSPECTED COVID-19 VIRUS INFECTION: ICD-10-CM

## 2021-01-21 LAB
LABORATORY COMMENT REPORT: NORMAL
SARS-COV-2 RNA RESP QL NAA+PROBE: NEGATIVE
SARS-COV-2 RNA RESP QL NAA+PROBE: NORMAL
SPECIMEN SOURCE: NORMAL
SPECIMEN SOURCE: NORMAL

## 2021-01-21 PROCEDURE — U0003 INFECTIOUS AGENT DETECTION BY NUCLEIC ACID (DNA OR RNA); SEVERE ACUTE RESPIRATORY SYNDROME CORONAVIRUS 2 (SARS-COV-2) (CORONAVIRUS DISEASE [COVID-19]), AMPLIFIED PROBE TECHNIQUE, MAKING USE OF HIGH THROUGHPUT TECHNOLOGIES AS DESCRIBED BY CMS-2020-01-R: HCPCS | Performed by: FAMILY MEDICINE

## 2021-01-21 PROCEDURE — U0005 INFEC AGEN DETEC AMPLI PROBE: HCPCS | Performed by: FAMILY MEDICINE

## 2021-02-16 ENCOUNTER — APPOINTMENT (OUTPATIENT)
Dept: OBGYN | Facility: CLINIC | Age: 36
End: 2021-02-16

## 2021-02-17 ENCOUNTER — PRENATAL OFFICE VISIT (OUTPATIENT)
Dept: OBGYN | Facility: CLINIC | Age: 36
End: 2021-02-17
Payer: COMMERCIAL

## 2021-02-17 ENCOUNTER — TELEPHONE (OUTPATIENT)
Dept: OBGYN | Facility: CLINIC | Age: 36
End: 2021-02-17

## 2021-02-17 DIAGNOSIS — Z34.80 PRENATAL CARE, SUBSEQUENT PREGNANCY: ICD-10-CM

## 2021-02-17 PROCEDURE — 99207 PR NO CHARGE NURSE ONLY: CPT | Performed by: OBSTETRICS & GYNECOLOGY

## 2021-02-17 SDOH — HEALTH STABILITY: MENTAL HEALTH: HOW MANY STANDARD DRINKS CONTAINING ALCOHOL DO YOU HAVE ON A TYPICAL DAY?: NOT ASKED

## 2021-02-17 SDOH — HEALTH STABILITY: MENTAL HEALTH: HOW OFTEN DO YOU HAVE A DRINK CONTAINING ALCOHOL?: NOT ASKED

## 2021-02-17 SDOH — HEALTH STABILITY: MENTAL HEALTH: HOW OFTEN DO YOU HAVE 6 OR MORE DRINKS ON ONE OCCASION?: NOT ASKED

## 2021-02-17 NOTE — TELEPHONE ENCOUNTER
"Reason for call:  Patient reporting a symptom    Symptom or request: Pt in tears, has 1st U/S on Friday - when she went to the bathroom this morning she saw \"a little little pink\"  \"I know this isn't a huge huge emergency, I am just really really nervous\"  Doesn't know how far along she is - states this was a surprise.    Duration (how long have symptoms been present):     Have you been treated for this before? No    Additional comments:     Phone Number patient can be reached at:  Home number on file [phone] (home)    Best Time:      Can we leave a detailed message on this number:  YES    Call taken on 2/17/2021 at 8:21 AM by Sara Kaufman    "

## 2021-02-17 NOTE — TELEPHONE ENCOUNTER
Return call to patient.  Spoke with patient on the phone.    S-(situation): Patient reports with urination this morning noticed a small amount of light pink discharge with wiping. Patient reports after wiping one time patient wiped again and it was gone. Patient denies bright red bleeding. Patient denies low back pain. Patient reports some menstrual like cramping for the past couple of weeks. Patient reports she thinks this worsened starting yesterday. Patient anxious and tearful. Patient denies constipation. Patient reports + bowel movement yesterday, normal. Patient denies recent intercourse.    B-(background): unknown LMP, 1st OB Friday 2/19    A-(assessment): light pink discharge x1    R-(recommendations): Reassurance provided. Continue to monitor. Bleeding precautions reviewed. Recommend ER for further evaluation if bright red bleeding develops and cramping worsens.     Pt in agreement and reports understanding.    Janelle Maldonado   [facility]  RN

## 2021-02-18 ENCOUNTER — TELEPHONE (OUTPATIENT)
Dept: OBGYN | Facility: CLINIC | Age: 36
End: 2021-02-18

## 2021-02-18 NOTE — TELEPHONE ENCOUNTER
Patient called back and was happy to go to imaging dept first for her ob ultrasound at 130pm.  She will come upstairs for her 230 appt with Dr Girard after her imagining is done.

## 2021-02-18 NOTE — TELEPHONE ENCOUNTER
Left message to call me back at [phone].  Seeing if patient would be interested in doing a formal ultrasound down in Imaging Department at 130pm on 2/19/2021 prior to her office visit at 230pm with Dr Girard.  Imaging department is needing to get 1st trimester ultrasounds done for their yearly competencies.    ORALIA VALENTIN MA

## 2021-02-19 ENCOUNTER — HOSPITAL ENCOUNTER (OUTPATIENT)
Dept: ULTRASOUND IMAGING | Facility: CLINIC | Age: 36
Discharge: HOME | End: 2021-02-19
Attending: OBSTETRICS & GYNECOLOGY | Admitting: OBSTETRICS & GYNECOLOGY
Payer: COMMERCIAL

## 2021-02-19 ENCOUNTER — PRENATAL OFFICE VISIT (OUTPATIENT)
Dept: OBGYN | Facility: CLINIC | Age: 36
End: 2021-02-19
Payer: COMMERCIAL

## 2021-02-19 VITALS
DIASTOLIC BLOOD PRESSURE: 76 MMHG | BODY MASS INDEX: 46.55 KG/M2 | WEIGHT: 279.4 LBS | RESPIRATION RATE: 16 BRPM | SYSTOLIC BLOOD PRESSURE: 132 MMHG | TEMPERATURE: 99.9 F | HEIGHT: 65 IN | HEART RATE: 92 BPM

## 2021-02-19 DIAGNOSIS — K58.0 IRRITABLE BOWEL SYNDROME WITH DIARRHEA: ICD-10-CM

## 2021-02-19 DIAGNOSIS — Z34.81 PRENATAL CARE, SUBSEQUENT PREGNANCY, FIRST TRIMESTER: ICD-10-CM

## 2021-02-19 DIAGNOSIS — E66.01 MORBIDLY OBESE (H): ICD-10-CM

## 2021-02-19 DIAGNOSIS — Z34.81 PRENATAL CARE, SUBSEQUENT PREGNANCY IN FIRST TRIMESTER: Primary | ICD-10-CM

## 2021-02-19 DIAGNOSIS — F41.9 ANXIETY: ICD-10-CM

## 2021-02-19 DIAGNOSIS — E11.9 TYPE 2 DIABETES MELLITUS WITHOUT COMPLICATION, UNSPECIFIED WHETHER LONG TERM INSULIN USE (H): ICD-10-CM

## 2021-02-19 PROCEDURE — 99207 PR FIRST OB VISIT: CPT | Performed by: OBSTETRICS & GYNECOLOGY

## 2021-02-19 PROCEDURE — 76801 OB US < 14 WKS SINGLE FETUS: CPT

## 2021-02-19 RX ORDER — LOPERAMIDE HCL 2 MG
2 CAPSULE ORAL 4 TIMES DAILY PRN
Status: ON HOLD | COMMUNITY
End: 2021-09-30

## 2021-02-19 ASSESSMENT — MIFFLIN-ST. JEOR: SCORE: 1945.29

## 2021-02-19 NOTE — NURSING NOTE
"Initial /76 (BP Location: Right arm, Patient Position: Sitting, Cuff Size: Adult Large)   Pulse 92   Temp 99.9  F (37.7  C) (Tympanic)   Resp 16   Ht 1.638 m (5' 4.5\")   Wt 126.7 kg (279 lb 6.4 oz)   LMP  (LMP Unknown)   BMI 47.22 kg/m   Estimated body mass index is 47.22 kg/m  as calculated from the following:    Height as of this encounter: 1.638 m (5' 4.5\").    Weight as of this encounter: 126.7 kg (279 lb 6.4 oz). .      "

## 2021-02-19 NOTE — PROGRESS NOTES
"[facility]    New OB Visit Note    CC: New OB     Subjective:    Mary Alice is a 35 year old  at unknown gestational age who presents for her initial OB visit. Pelvic US performed in radiology showing gestational sac and early fetal pole but too early for FHT. Patient is very nervous. This is an unplanned but wanted pregnancy. She reports feeling well. Denies any uterine cramping, abdominal pain or vaginal bleeding. Denies nausea and vomiting. Is having some fatigue.      OB History    Para Term  AB Living   2 1 1 0 0 1   SAB TAB Ectopic Multiple Live Births   0 0 0 0 1      # Outcome Date GA Lbr Chris/2nd Weight Sex Delivery Anes PTL Lv   2 Current            1 Term 07 42w0d  3.317 kg (7 lb 5 oz) M  EPI  LAURYN      Name: Tea         Current Outpatient Medications   Medication Sig Dispense Refill     Prenatal Vit-Fe Fumarate-FA (PRENATAL VITAMIN PO)        escitalopram (LEXAPRO) 20 MG tablet Take 1 tablet (20 mg) by mouth daily (Patient not taking: Reported on 2021) 30 tablet 0     loperamide (IMODIUM) 2 MG capsule Take 2 mg by mouth 4 times daily as needed for diarrhea       ROS: A 10 pt ROS was completed and found to be negative unless mentioned in the HPI.     Objective:    /76 (BP Location: Right arm, Patient Position: Sitting, Cuff Size: Adult Large)   Pulse 92   Temp 99.9  F (37.7  C) (Tympanic)   Resp 16   Ht 1.638 m (5' 4.5\")   Wt 126.7 kg (279 lb 6.4 oz)   LMP  (LMP Unknown)   BMI 47.22 kg/m      Estimated body mass index is 47.22 kg/m  as calculated from the following:    Height as of this encounter: 1.638 m (5' 4.5\").    Weight as of this encounter: 126.7 kg (279 lb 6.4 oz).    General appearance: well-hydrated, A&O x 3, no apparent distress  Lungs: Equal expansion bilaterally, no accessory muscle use  Heart: No heaves or thrills.   Constitutional: See vitals    Pelvic US:  Independently reviewed pelvic ultrasound imaging before report " was available. Gestational sac 1.1cm, yolk sac present, possible fetal pole 1.3cm. No FHT.     Reviewed previous HbA1c from 2015.    Assessment and Plan:     Encounter Diagnoses   Name Primary?     Prenatal care, subsequent pregnancy in first trimester Yes     Type 2 diabetes mellitus without complication, unspecified whether long term insulin use (H)      Morbidly obese (H)      Anxiety      Irritable bowel syndrome with diarrhea        35 year old  at 5-6 weeks gestation who presents for her initial OB visit. No FHT and 0.3cm fetal pole seen on US today. Gestational sac measuring 1.1cm, approximately 5w6d. Likely too early for FHT. Will need repeat imaging in 2 weeks. Will defer NOB labs for that time.    Did briefly discuss with patient the higher risk nature of pregnancy due to DM, obesity, and AMA. She has not exactly been followed for diabetes since diagnosis. She has never checked her blood sugar. Somewhat follows diabetic diet. Instructed to follow diabetic diet for the time being and we will set her up with diabetic team for checking glucose when pregnancy is confirmed. She will also need MFM consultation.     Discussed miscarriage precautions. Discussed Lexapro use in pregnancy, she has self weaned and reports anxiety symptoms are controlled. OK to restart if needed. Also discussed immodium use for IBS symptoms in pregnancy.       RTC 2 weeks for repeat US and NOB visit if viable.    Kelly Girard DO

## 2021-02-22 ENCOUNTER — TELEPHONE (OUTPATIENT)
Dept: OBGYN | Facility: CLINIC | Age: 36
End: 2021-02-22

## 2021-02-22 NOTE — TELEPHONE ENCOUNTER
Reason for Call:  Other nausea    Detailed comments: Pt states she is having a lot of morning sickness, lasts all day. Is there something she can take? Please call    Phone Number Patient can be reached at: Home number on file [phone] (home)    Best Time: today    Can we leave a detailed message on this number? YES    Call taken on 2/22/2021 at 9:21 AM by Ann Marie Barfield

## 2021-03-02 ENCOUNTER — PRENATAL OFFICE VISIT (OUTPATIENT)
Dept: OBGYN | Facility: CLINIC | Age: 36
End: 2021-03-02

## 2021-03-02 VITALS
DIASTOLIC BLOOD PRESSURE: 74 MMHG | RESPIRATION RATE: 18 BRPM | TEMPERATURE: 99.2 F | HEIGHT: 65 IN | BODY MASS INDEX: 46.48 KG/M2 | HEART RATE: 103 BPM | SYSTOLIC BLOOD PRESSURE: 156 MMHG | WEIGHT: 279 LBS

## 2021-03-02 DIAGNOSIS — E11.9 TYPE 2 DIABETES MELLITUS WITHOUT COMPLICATION, WITHOUT LONG-TERM CURRENT USE OF INSULIN (H): ICD-10-CM

## 2021-03-02 DIAGNOSIS — O09.521 MULTIGRAVIDA OF ADVANCED MATERNAL AGE IN FIRST TRIMESTER: ICD-10-CM

## 2021-03-02 DIAGNOSIS — Z34.81 PRENATAL CARE, SUBSEQUENT PREGNANCY IN FIRST TRIMESTER: Primary | ICD-10-CM

## 2021-03-02 LAB
ABO + RH BLD: NORMAL
ABO + RH BLD: NORMAL
ALBUMIN UR-MCNC: NEGATIVE MG/DL
ALT SERPL W P-5'-P-CCNC: 26 U/L (ref 0–50)
APPEARANCE UR: CLEAR
AST SERPL W P-5'-P-CCNC: 14 U/L (ref 0–45)
BACTERIA #/AREA URNS HPF: ABNORMAL /HPF
BILIRUB UR QL STRIP: NEGATIVE
BLD GP AB SCN SERPL QL: NORMAL
BLOOD BANK CMNT PATIENT-IMP: NORMAL
COLOR UR AUTO: YELLOW
CREAT SERPL-MCNC: 0.49 MG/DL (ref 0.52–1.04)
ERYTHROCYTE [DISTWIDTH] IN BLOOD BY AUTOMATED COUNT: 14.9 % (ref 10–15)
GFR SERPL CREATININE-BSD FRML MDRD: >90 ML/MIN/{1.73_M2}
GLUCOSE UR STRIP-MCNC: NEGATIVE MG/DL
HBA1C MFR BLD: 7.5 % (ref 0–5.6)
HCT VFR BLD AUTO: 38.4 % (ref 35–47)
HGB BLD-MCNC: 12.5 G/DL (ref 11.7–15.7)
HGB UR QL STRIP: ABNORMAL
KETONES UR STRIP-MCNC: NEGATIVE MG/DL
LEUKOCYTE ESTERASE UR QL STRIP: NEGATIVE
MCH RBC QN AUTO: 25.9 PG (ref 26.5–33)
MCHC RBC AUTO-ENTMCNC: 32.6 G/DL (ref 31.5–36.5)
MCV RBC AUTO: 80 FL (ref 78–100)
NITRATE UR QL: NEGATIVE
NON-SQ EPI CELLS #/AREA URNS LPF: ABNORMAL /LPF
PH UR STRIP: 5 PH (ref 5–7)
PLATELET # BLD AUTO: 283 10E9/L (ref 150–450)
RBC # BLD AUTO: 4.82 10E12/L (ref 3.8–5.2)
RBC #/AREA URNS AUTO: ABNORMAL /HPF
SOURCE: ABNORMAL
SP GR UR STRIP: >1.03 (ref 1–1.03)
SPECIMEN EXP DATE BLD: NORMAL
UROBILINOGEN UR STRIP-ACNC: 0.2 EU/DL (ref 0.2–1)
WBC # BLD AUTO: 10.9 10E9/L (ref 4–11)
WBC #/AREA URNS AUTO: ABNORMAL /HPF

## 2021-03-02 PROCEDURE — 87591 N.GONORRHOEAE DNA AMP PROB: CPT | Performed by: OBSTETRICS & GYNECOLOGY

## 2021-03-02 PROCEDURE — 86850 RBC ANTIBODY SCREEN: CPT | Performed by: OBSTETRICS & GYNECOLOGY

## 2021-03-02 PROCEDURE — 84460 ALANINE AMINO (ALT) (SGPT): CPT | Performed by: OBSTETRICS & GYNECOLOGY

## 2021-03-02 PROCEDURE — 87624 HPV HI-RISK TYP POOLED RSLT: CPT | Performed by: OBSTETRICS & GYNECOLOGY

## 2021-03-02 PROCEDURE — 83036 HEMOGLOBIN GLYCOSYLATED A1C: CPT | Performed by: OBSTETRICS & GYNECOLOGY

## 2021-03-02 PROCEDURE — 99000 SPECIMEN HANDLING OFFICE-LAB: CPT | Performed by: OBSTETRICS & GYNECOLOGY

## 2021-03-02 PROCEDURE — 87340 HEPATITIS B SURFACE AG IA: CPT | Performed by: OBSTETRICS & GYNECOLOGY

## 2021-03-02 PROCEDURE — 86901 BLOOD TYPING SEROLOGIC RH(D): CPT | Performed by: OBSTETRICS & GYNECOLOGY

## 2021-03-02 PROCEDURE — 86900 BLOOD TYPING SEROLOGIC ABO: CPT | Performed by: OBSTETRICS & GYNECOLOGY

## 2021-03-02 PROCEDURE — 87491 CHLMYD TRACH DNA AMP PROBE: CPT | Performed by: OBSTETRICS & GYNECOLOGY

## 2021-03-02 PROCEDURE — 87389 HIV-1 AG W/HIV-1&-2 AB AG IA: CPT | Performed by: OBSTETRICS & GYNECOLOGY

## 2021-03-02 PROCEDURE — 99207 PR FIRST OB VISIT: CPT | Performed by: OBSTETRICS & GYNECOLOGY

## 2021-03-02 PROCEDURE — 86762 RUBELLA ANTIBODY: CPT | Performed by: OBSTETRICS & GYNECOLOGY

## 2021-03-02 PROCEDURE — 86780 TREPONEMA PALLIDUM: CPT | Mod: 90 | Performed by: OBSTETRICS & GYNECOLOGY

## 2021-03-02 PROCEDURE — 87086 URINE CULTURE/COLONY COUNT: CPT | Performed by: OBSTETRICS & GYNECOLOGY

## 2021-03-02 PROCEDURE — 82565 ASSAY OF CREATININE: CPT | Performed by: OBSTETRICS & GYNECOLOGY

## 2021-03-02 PROCEDURE — 76817 TRANSVAGINAL US OBSTETRIC: CPT | Performed by: OBSTETRICS & GYNECOLOGY

## 2021-03-02 PROCEDURE — 81001 URINALYSIS AUTO W/SCOPE: CPT | Performed by: OBSTETRICS & GYNECOLOGY

## 2021-03-02 PROCEDURE — G0145 SCR C/V CYTO,THINLAYER,RESCR: HCPCS | Performed by: OBSTETRICS & GYNECOLOGY

## 2021-03-02 PROCEDURE — 36415 COLL VENOUS BLD VENIPUNCTURE: CPT | Performed by: OBSTETRICS & GYNECOLOGY

## 2021-03-02 PROCEDURE — 84450 TRANSFERASE (AST) (SGOT): CPT | Performed by: OBSTETRICS & GYNECOLOGY

## 2021-03-02 PROCEDURE — 85027 COMPLETE CBC AUTOMATED: CPT | Performed by: OBSTETRICS & GYNECOLOGY

## 2021-03-02 ASSESSMENT — MIFFLIN-ST. JEOR: SCORE: 1943.48

## 2021-03-02 NOTE — NURSING NOTE
"Initial BP (!) 156/74 (BP Location: Right arm, Patient Position: Chair, Cuff Size: Adult Large)   Pulse 103   Temp 99.2  F (37.3  C) (Tympanic)   Resp 18   Ht 1.638 m (5' 4.5\")   Wt 126.6 kg (279 lb)   LMP  (LMP Unknown)   Breastfeeding No   BMI 47.15 kg/m   Estimated body mass index is 47.15 kg/m  as calculated from the following:    Height as of this encounter: 1.638 m (5' 4.5\").    Weight as of this encounter: 126.6 kg (279 lb). .      "

## 2021-03-02 NOTE — PROGRESS NOTES
Discussed physician coverage, tertiary support, diet, exercise, weight gain, schedule of visits, routine and indicated ultrasounds, childbirth education and antepartum testing for certain birth defects.  Encouraged patient to review contents of Prenatal Breastfeeding Education Toolkit. Offered opportunity to answer questions regarding the importance of skin to skin contact, early initiation of exclusive breastfeeding for the first six months and rooming in while in the hospital.    Syphilis is a sexually transmitted disease that can cause birth defects in the babies of untreated mothers. Every pregnant patient is tested for syphilis early in each pregnancy as part of the routine lab work. The [place] [facility] of [facility] has seen an increase in the rate of syphilis in [place]. The [facility] now recommends testing for syphilis 2 times during a pregnancy, the new prenatal visit, and 28 weeks or when admitted for delivery. Patient accepts lab testing for syphilis.    Group call support for deliveries was discussed, as well as tertiary support in event of high risk issues within [facility].    Discussed current state of COVID-19 in pregnancy, when to seek out emergency care, how to self care at home with milder symptoms.        Options for  testing for birth defects were discussed with the patient, generally including CVS testing, Quad screen serum testing, nuchal lucency/blood marker testing, genetic amniocentesis, Verifi testing  and/or level 2 ultrasound.    Current issues include: unknown dating; not clearly aware of dx of type 2 DM since elevated A1C in ; not checking BS or A1C, not on meds    Past medical, surgical, social and family histories reviewed on OB questionaire and included on episode summary.  Pertinent review of systems items stated above. See OB questionaire for pertinent components of HPI.    Past Medical History:   Diagnosis Date     Anxiety      Chickenpox       "Irritable bowel syndrome (IBS)      See epic chart    Past Surgical History:   Procedure Laterality Date     TONSILLECTOMY  6th grade     See epic chart    Patient Active Problem List    Diagnosis Date Noted     Multigravida of advanced maternal age in first trimester 03/02/2021     Priority: Medium     Prenatal care, subsequent pregnancy 02/17/2021     Priority: Medium     FOB- Tino Julian       Anxiety 03/09/2020     Priority: Medium     Irregular menses 02/11/2016     Priority: Medium     Type 2 diabetes mellitus without complications (H) 10/23/2015     Priority: Medium     2015 Hgb A1c=6.8       Elevated liver enzymes 10/08/2015     Priority: Medium     Female infertility, secondary 10/06/2015     Priority: Medium     Morbidly obese (H) 08/14/2014     Priority: Medium       OBJECTIVE: BP (!) 156/74 (BP Location: Right arm, Patient Position: Chair, Cuff Size: Adult Large)   Pulse 103   Temp 99.2  F (37.3  C) (Tympanic)   Resp 18   Ht 1.638 m (5' 4.5\")   Wt 126.6 kg (279 lb)   LMP  (LMP Unknown)   Breastfeeding No   BMI 47.15 kg/m      GENERAL APPEARANCE: healthy, alert and no distress  ABDOMEN:  soft, nontender, no hepato-splenomegaly or hernias  PELVIC:  EGBUS:  within normal limits  VAGINA:  normoestrogenic, well-supported, no unusual discharge  CERVIX:  smooth, non-friable, no gross lesions, thin-layer PAP was taken  UTERUS:  anteverted and enlarged, 8 weeks size  ADNEXAE:  non-tender, no masses palpable, no cul de sac nodularity     NECK: no adenopathy, no asymmetry, masses, or scars and thyroid normal to palpation     RESP: lungs clear to auscultation , respiratory effort WNL     CV: regular rates and rhythm, normal S1 S2, no S3 or S4 and no murmur, click or rub -     SKIN: no suspicious lesions or rashes     PSYCH: mentation appears normal. and affect normal/bright, oriented to person/place/time     LYMPHATICS: No axillary, cervical, inguinal, or supraclavicular nodes    Transvaginal ultrasound was " performed. A live single intrauterine pregnancy was seen.  CRL=1.08 cm, c/w 7 weeks, 1 days.  EDC by sono =10/18/21    Fetal heart motion was visualized. IQD=428 bpm                ASSESSMENT:    ICD-10-CM    1. Prenatal care, subsequent pregnancy in first trimester  Z34.81 CBC with platelets     ABO/Rh type and screen     Hepatitis B surface antigen     Rubella Antibody IgG Quantitative     Urine Culture Aerobic Bacterial     *UA reflex to Microscopic     HIV Antigen Antibody Combo     US OB <14 Weeks w Transvaginal Single     Treponema Abs w Reflex to RPR and Titer     Chlamydia trachomatis PCR     Neisseria gonorrhoeae PCR     Hemoglobin A1c     Creatinine     AST     ALT   2. Type 2 diabetes mellitus without complication, without long-term current use of insulin (H)  E11.9 Hemoglobin A1c     AMBULATORY ADULT DIABETES EDUCATOR REFERRAL   3. Multigravida of advanced maternal age in first trimester  O09.521          PLAN: see orders and OB problem list annotations  >>Diabetic education referral  >>start ASA 81mg po at 12 wks EGA  >>desires NIPT with gender at next visit  >>baseline pre-E labs  >>outlined plan of care including BS testing, possible insulin use, level 2 ultrasound, fetal echo, serial growth ultrasound and BPP monitoring  Kaity Street MD

## 2021-03-03 LAB
BACTERIA SPEC CULT: NORMAL
C TRACH DNA SPEC QL NAA+PROBE: NEGATIVE
HBV SURFACE AG SERPL QL IA: NONREACTIVE
HIV 1+2 AB+HIV1 P24 AG SERPL QL IA: NONREACTIVE
Lab: NORMAL
N GONORRHOEA DNA SPEC QL NAA+PROBE: NEGATIVE
RUBV IGG SERPL IA-ACNC: 23 IU/ML
SPECIMEN SOURCE: NORMAL
T PALLIDUM AB SER QL: NONREACTIVE

## 2021-03-04 ENCOUNTER — PATIENT OUTREACH (OUTPATIENT)
Dept: EDUCATION SERVICES | Facility: CLINIC | Age: 36
End: 2021-03-04
Attending: OBSTETRICS & GYNECOLOGY

## 2021-03-04 DIAGNOSIS — E11.9 TYPE 2 DIABETES MELLITUS WITHOUT COMPLICATION, WITHOUT LONG-TERM CURRENT USE OF INSULIN (H): ICD-10-CM

## 2021-03-04 PROCEDURE — G0108 DIAB MANAGE TRN  PER INDIV: HCPCS | Mod: 95 | Performed by: DIETITIAN, REGISTERED

## 2021-03-04 NOTE — PROGRESS NOTES
Diabetes Self-Management Education & Support    Presents for: Individual review    Type of Service: Telephone Visit    How would patient like to obtain AVS? Davide    SUBJECTIVE/OBJECTIVE:  Presents for: Individual review  Accompanied by: Self  Diabetes education in the past 24mo: No  Focus of Visit: Monitoring, Healthy Eating, Diabetes Pathophysiology  Diabetes type: Type 2  Date of diagnosis: unknown - 2015?  Diabetes management related comments/concerns: Was supposed to deal with diabetes prior to COVID but just never did, now pregnant and nervous about blood sugars  Other concerns:: None  Cultural Influences/Ethnic Background:  American      Diabetes Symptoms & Complications  Fatigue: No  Neuropathy: No  Polydipsia: Sometimes  Polyphagia: No  Polyuria: Sometimes  Visual change: No  Slow healing wounds: No  Symptom course: Stable  Weight trend: Fluctuating(Down to 250-260 in the summer, gained it back recently)  Complications assessed today?: No    Patient Problem List and Family Medical History reviewed for relevant medical history, current medical status, and diabetes risk factors.    Vitals:  LMP  (LMP Unknown)     Pre pregnancy weight: 277#    Weight gain not assessed      Estimated Date of Delivery: Oct 18, 2021    Labs:  Lab Results   Component Value Date    A1C 7.5 03/02/2021     Lab Results   Component Value Date     10/08/2015     Lab Results   Component Value Date    LDL 91 10/08/2015     HDL Cholesterol   Date Value Ref Range Status   10/08/2015 51 >50 mg/dL Final   ]  GFR Estimate   Date Value Ref Range Status   03/02/2021 >90 >60 mL/min/[1.73_m2] Final     Comment:     Non  GFR Calc  Starting 12/18/2018, serum creatinine based estimated GFR (eGFR) will be   calculated using the Chronic Kidney Disease Epidemiology Collaboration   (CKD-EPI) equation.       GFR Estimate If Black   Date Value Ref Range Status   03/02/2021 >90 >60 mL/min/[1.73_m2] Final     Comment:       American GFR Calc  Starting 12/18/2018, serum creatinine based estimated GFR (eGFR) will be   calculated using the Chronic Kidney Disease Epidemiology Collaboration   (CKD-EPI) equation.       Lab Results   Component Value Date    CR 0.49 03/02/2021     No results found for: MICROALBUMIN    Last 3 BP:   BP Readings from Last 3 Encounters:   03/02/21 (!) 156/74   02/19/21 132/76   12/30/20 132/84       History   Smoking Status     Former Smoker     Packs/day: 0.00   Smokeless Tobacco     Never Used       Healthy Eating  Healthy Eating Assessed Today: Yes  Meal planning/habits: None  Meals include: Breakfast, Lunch, Morning Snack, Afternoon Snack, Dinner  Breakfast: skips OR handful of Cheerios for morning sickness; 2 hrs later - oatmeal, eggs or bowl of cereal  Lunch: chicken salad  Dinner: 1 soft shell taco (low carbs), hamburger, veggies  Snacks: AM - raspberries; PM - string cheese, brownie  Beverages: Water  Has patient met with a dietitian in the past?: No      Being Active  Being Active Assessed Today: Yes  Exercise:: Yes  Days per week of moderate to strenuous exercise (like a brisk walk): 7  On average, minutes per day of exercise at this level: 30  How intense was your typical exercise? : Moderate (like brisk walking)  Exercise Minutes per Week: 210  Barrier to exercise: None    Monitoring  Monitoring Assessed Today: Yes  Did patient bring glucose meter to appointment? : No  Times checking blood sugar at home (number): Never      Checking for urine ketones? no    Taking Medications      Any changes to above medications since becoming pregnant: no    Taking Medication Assessed Today: Yes  Current Treatments: None    Problem Solving  Problem Solving Assessed Today: No  Is the patient at risk for hypoglycemia?: No  Is the patient at risk for DKA?: No  Does patient have severe weather/disaster plan for diabetes management?: No  Does patient have sick day plan for diabetes management?: No              Reducing  Risks  Reducing Risks Assessed Today: No  Diabetes Risks: Family History  CAD Risks: Diabetes Mellitus, Obesity    Healthy Coping  Healthy Coping Assessed Today: Yes  Emotional response to diabetes: Ready to learn, Concern for health and well-being  Informal Support system:: Family  Stage of change: PREPARATION (Decided to change - considering how)  Support resources: None    Patient Activation Measure Survey Score:  BONI Score (Last Two) 10/31/2014   BONI Raw Score 45   Activation Score 73.1   BONI Level 4         INTERVENTION:  Patient presented for Type 2 Diabetes. Advised to check glucose at least 6 times a day, before each meal and 1 hour after the start of each meal. and Reviewed carbohydrate counting, label reading, and meal plan of 45-60 grams carb at breakfast, 45-60 grams of carb at lunch, 45-60 grams of carb at dinner, and 15 grams of carb at snacks. Encouraged balanced meals, including protein and fat with carb at all meals and snacks.      ASSESSMENT:  Patient is motivated to make changes to diet & start checking blood sugars right away. Her dad has T1DM and uses Freestyle Zi CGMS. She is open to using a tool like this to help track blood sugars. She has had ongoing nausea which has affected her appetite and intake recently. She is very motivated to make healthy choices, asks good questions about diet and [facility] to monitor progress in blood sugar control throughout pregnancy.     Education provided today on:  AADE Self-Care Behaviors:  Healthy Eating: carbohydrate counting, consistency in amount, composition, and timing of food intake, portion control and label reading  Monitoring: individual blood glucose targets and frequency of monitoring  Taking Medication: likelihood that she will need medication during pregnancy, that insulin is recommended medication during pregnancy rather than other T2DM meds   Reducing Risks: major complications of diabetes +  pergnancy    Referral to care team: Endocrinologist    Opportunities for ongoing education and support in diabetes-self management were discussed.    Pt verbalized understanding of concepts discussed and recommendations provided today.       Education Materials Provided:  GDM ed materials provided via email - new GDM diagnosis booklet, logbook, food log      PLAN:  Eat meals TID + HS snack - 45-60 grams carb per meals & 15-30 grams + protein at HS snack   Monitor blood sugars QID - fasting & 1 hr after meals  Recommend use of Freestyle Zi CGMS to allow for more frequent blood sugar monitoring - will request sign off from OB in separate encounter  Referral to endocrinology, per [facility] [place]     Savannah Aldridge RD, LD, [facility] [place]   Time Spent: 60 minutes  Encounter Type: Individual    Any diabetes medication dose changes were made via the CDE Protocol and Collaborative Practice Agreement with the patient's OB/GYN provider. A copy of this encounter was shared with the provider.      FOLLOW-UP:  Follow up with diabetes educator in 1 week.  Call or e-mail educator if 3 or more blood sugars are above goal in 1 week.  Appointment scheduled on 3/10.   Chart routed to referring provider.

## 2021-03-04 NOTE — PATIENT INSTRUCTIONS
Your 1 week follow-up Diabetes Education visit is scheduled on 3/10    1. Check blood sugar 4 times a day, before breakfast and 1 hour after the start of each meal.     2. Follow the recommended meal plan: eat something every 2-3 hours, include protein/fat and carbohydrate at every meal and snack, have 45-60g carbs at breakfast, 45-60g carbs at lunch, 45-60g carbs at supper, 15g carbs at snack at bedtime.     4. Add activity to every day, try walking or being active after each meal to help control blood sugar levels.    5.Call or send a [employer] message to your educator if 3 or more blood sugars are above goal in 1 week, you have an elevated ketone result (trace or higher), or with questions or concerns.    [place] [facility] for the [facility]:  For Your Diabetes Education or Nutrition Appointments Call:  [phone]   For Diabetes Education and Nutrition Related Questions:   Phone: [phone]  Send Monitoring Division Message   If you need a medication refill please contact your pharmacy. Please allow 3 business days for your refills to be completed.

## 2021-03-05 ENCOUNTER — TELEPHONE (OUTPATIENT)
Dept: EDUCATION SERVICES | Facility: CLINIC | Age: 36
End: 2021-03-05

## 2021-03-05 ENCOUNTER — MYC MEDICAL ADVICE (OUTPATIENT)
Dept: EDUCATION SERVICES | Facility: CLINIC | Age: 36
End: 2021-03-05

## 2021-03-05 DIAGNOSIS — E11.9 TYPE 2 DIABETES MELLITUS WITHOUT COMPLICATION, UNSPECIFIED WHETHER LONG TERM INSULIN USE (H): Primary | ICD-10-CM

## 2021-03-05 LAB
COPATH REPORT: NORMAL
PAP: NORMAL

## 2021-03-05 NOTE — TELEPHONE ENCOUNTER
Dr. Street,   I think patient would benefit from use of Freestyle Zi CGMS to allow for more frequent blood sugar monitoring throughout this pregnancy. I've pended orders. Please sign off if you agree or provide an alternative plan.   Thank you!  Savannah Aldridge, RD, LD, [facility]

## 2021-03-05 NOTE — TELEPHONE ENCOUNTER
Dr. Street,   See message from patient about today's blood sugars. I think we should start some background insulin to at least bring fasting/preprandial numbers down as we continue to work on diet adjustments & see how her baseline blood sugar control looks. I've pended orders for levemir 19 unit at  (0.15 unit/kg). Please sign off if you agree or provide an alternative plan.   Thank you!  Savannah Aldridge, RD, LD, [facility]

## 2021-03-07 ENCOUNTER — MYC MEDICAL ADVICE (OUTPATIENT)
Dept: EDUCATION SERVICES | Facility: CLINIC | Age: 36
End: 2021-03-07

## 2021-03-08 ENCOUNTER — MYC MEDICAL ADVICE (OUTPATIENT)
Dept: EDUCATION SERVICES | Facility: CLINIC | Age: 36
End: 2021-03-08

## 2021-03-08 LAB
FINAL DIAGNOSIS: NORMAL
HPV HR 12 DNA CVX QL NAA+PROBE: NEGATIVE
HPV16 DNA SPEC QL NAA+PROBE: NEGATIVE
HPV18 DNA SPEC QL NAA+PROBE: NEGATIVE
SPECIMEN DESCRIPTION: NORMAL
SPECIMEN SOURCE CVX/VAG CYTO: NORMAL

## 2021-03-08 RX ORDER — FLASH GLUCOSE SCANNING READER
EACH MISCELLANEOUS
Qty: 1 EACH | Refills: 0 | Status: SHIPPED | OUTPATIENT
Start: 2021-03-08 | End: 2021-11-08

## 2021-03-08 RX ORDER — FLASH GLUCOSE SENSOR
KIT MISCELLANEOUS
Qty: 2 EACH | Refills: 11 | Status: SHIPPED | OUTPATIENT
Start: 2021-03-08 | End: 2021-11-08

## 2021-03-10 ENCOUNTER — VIRTUAL VISIT (OUTPATIENT)
Dept: EDUCATION SERVICES | Facility: CLINIC | Age: 36
End: 2021-03-10

## 2021-03-10 DIAGNOSIS — E11.9 TYPE 2 DIABETES MELLITUS WITHOUT COMPLICATION, WITHOUT LONG-TERM CURRENT USE OF INSULIN (H): Primary | ICD-10-CM

## 2021-03-10 DIAGNOSIS — E11.9 TYPE 2 DIABETES MELLITUS WITHOUT COMPLICATION, UNSPECIFIED WHETHER LONG TERM INSULIN USE (H): Primary | ICD-10-CM

## 2021-03-10 PROCEDURE — G0108 DIAB MANAGE TRN  PER INDIV: HCPCS | Mod: 95

## 2021-03-10 RX ORDER — HUMAN INSULIN 100 [IU]/ML
19 INJECTION, SUSPENSION SUBCUTANEOUS AT BEDTIME
Qty: 30 ML | Refills: 3 | Status: SHIPPED | OUTPATIENT
Start: 2021-03-10 | End: 2021-05-12

## 2021-03-10 RX ORDER — URINE ACETONE TEST STRIPS
STRIP MISCELLANEOUS
Qty: 50 STRIP | Refills: 1 | Status: SHIPPED | OUTPATIENT
Start: 2021-03-10 | End: 2021-11-08

## 2021-03-10 NOTE — PATIENT INSTRUCTIONS
Send us a blood glucose update on Friday (3/12/21).     Prescription for NPH insulin (19 units at bedtime) was routed to your OB doctor for signature.      See [employer] message from today for additional recommendations.     -----------------    1. Check blood sugar 4 times a day, before breakfast and 1 hour after the start of each meal.     2. Check urine ketones when you wake up every morning for 7 days. If negative everyday, reduce testing to once a week.    3. Follow the recommended meal plan: eat something every 2-3 hours, include protein/fat and carbohydrate at every meal and snack, have 2-3 carbs at breakfast, 3-4 carbs at lunch, 3-4 carbs at supper, 1-2 carbs at 3 snacks per day.     4. Add activity to every day, try walking or being active after each meal to help control blood sugar levels.    5.Call or send a [employer] message to your educator if 3 or more blood sugars are above goal in 1 week, you have an elevated ketone result (trace or higher), or with questions or concerns.    Jenna Cooper RD, LD, [facility]  Diabetes     [place] [facility] for the [facility]:  For Your Diabetes Education or Nutrition Appointments Call:  [phone]   For Diabetes Education and Nutrition Related Questions:   Phone: [phone]  Send [employer] Message   If you need a medication refill please contact your pharmacy. Please allow 3 business days for your refills to be completed.

## 2021-03-10 NOTE — TELEPHONE ENCOUNTER
See DM Ed encounter from today for additional details. Mary Alice Jordan has had more than 50% of fasting glucoses above goal in the last week. Levemir previously ordered, however pt reports co-pay of $600.     Routing pended rx for NPH insulin, 19 units at bedtime (approx .15 unit(s)/kg/d).       Thank you,  Jenna Cooper RD, [facility]  Diabetes

## 2021-03-10 NOTE — PROGRESS NOTES
Diabetes Self-Management Education & Support    SUBJECTIVE/OBJECTIVE:  Presents for education related to Type 2 diabetes in pregnancy.    Accompanied by: Self  Gestational weeks: 9w  Number of previous preganancies: 1  Had any babies over 9 lbs: No  Previously had Gestational Diabetes: No    Cultural Influences/Ethnic Background:  American    LMP  (LMP Unknown)     Weight gain 2 lbs at 9 weeks gestation.    Wt Readings from Last 3 Encounters:   03/02/21 126.6 kg (279 lb)   02/19/21 126.7 kg (279 lb 6.4 oz)   12/30/20 125.6 kg (277 lb)       Estimated Date of Delivery: Oct 18, 2021    Lifestyle and Health Behaviors:  Exercise:: Yes  Days per week of moderate to strenuous exercise (like a brisk walk): 7  On average, minutes per day of exercise at this level: 30  How intense was your typical exercise? : Moderate (like brisk walking)  Exercise Minutes per Week: 210  Barrier to exercise: None  Meal planning/habits: None  Meals include: Breakfast, Lunch, Morning Snack, Afternoon Snack, Dinner  Breakfast: skips OR handful of Cheerios for morning sickness; 2 hrs later - oatmeal, eggs or bowl of cereal  Lunch: chicken salad  Dinner: 1 soft shell taco (low carbs), hamburger, veggies  Snacks: AM - raspberries; PM - string cheese, brownie  Beverages: Water  Experiencing nausea?: Yes    Healthy Coping:  Emotional response to diabetes: Ready to learn, Concern for health and well-being  Informal Support system:: Family  Stage of change: PREPARATION (Decided to change - considering how)    Current Management:  Taking medications for gestational diabetes?: No    Blood Glucose/ Food Log:               ASSESSMENT:  Ketones: currently not checking.   Fasting blood glucoses: 0% in target.  After breakfast: 40% in target.  After lunch: 75% in target.  After dinner: 100% in target.    Pt reports did not  Levemir- cost is $600. Also, does not have Zi- cost was $200.   Reviewed BG values, food choices, and exercise habits. Though  several recommendations were made today (avoid cereal and fruit for breakfast, add a bed time snack, and walk for 10-15 min after every meal), insulin may likely still be necessary. Recommend rx for NPH insulin 19 units at bedtime (approx 0.15 unit(s)/kg/d). Pt agreeable, will  insulin and provide  a BG update in 2 days. If fasting BG values are still above target with changes to meal plan and activity level, pt will start insulin as directed.  Pt will also begin ketone testing.    INTERVENTION:  Educational topics covered today:  Review of meal planning guidelines and exercise recommendations, use of NPH insulin since Levemir not covered.    Educational Materials provided today:  none    PLAN:  Routing new insulin prescription for NPH insulin in separate telephone encounter.  If fasting blood glucose values still above target over next 2 days, recommend start NPH insulin 19 units at bedtime.   Avoid cereal, fruit, or milk at breakfast time.  Do not skip the bedtime snack.  Try to walk for 10-15 min after every meal.  Drink plenty of water.   Check ketones daily until negative for seven days in a row, then check once weekly.     Check glucose 4 times daily.  Continue to follow recommended meal plan: 2-3 carbs at breakfast, 3-4 carbs at lunch, 3-4 carbs at supper, 1-2 carbs at snacks.  Follow consistent CHO meal plan, eat CHO and protein/fat at all meals/snacks.    Call/e-mail/MyChart message diabetes educator if 3 or more blood sugars are above the goal in 1 week or if ketones are positive.    Jenna Cooper RD, [facility]  Diabetes     Time Spent: 30 minutes  Encounter Type: Individual    Any diabetes medication dose changes were made via the CDE Protocol and Collaborative Practice Agreement with the patient's OB/GYN provider. A copy of this encounter was shared with the provider.

## 2021-03-30 ENCOUNTER — PRENATAL OFFICE VISIT (OUTPATIENT)
Dept: OBGYN | Facility: CLINIC | Age: 36
End: 2021-03-30

## 2021-03-30 VITALS
HEIGHT: 64 IN | BODY MASS INDEX: 45.58 KG/M2 | DIASTOLIC BLOOD PRESSURE: 74 MMHG | HEART RATE: 94 BPM | RESPIRATION RATE: 18 BRPM | SYSTOLIC BLOOD PRESSURE: 130 MMHG | WEIGHT: 267 LBS | TEMPERATURE: 99.2 F

## 2021-03-30 DIAGNOSIS — E11.9 TYPE 2 DIABETES MELLITUS WITHOUT COMPLICATION, UNSPECIFIED WHETHER LONG TERM INSULIN USE (H): ICD-10-CM

## 2021-03-30 DIAGNOSIS — O09.521 MULTIGRAVIDA OF ADVANCED MATERNAL AGE IN FIRST TRIMESTER: Primary | ICD-10-CM

## 2021-03-30 PROCEDURE — 99000 SPECIMEN HANDLING OFFICE-LAB: CPT | Performed by: OBSTETRICS & GYNECOLOGY

## 2021-03-30 PROCEDURE — 99N1100 PR STATISTIC VERIFI PRENATAL TRISOMY 21,18,13: Mod: 90 | Performed by: OBSTETRICS & GYNECOLOGY

## 2021-03-30 PROCEDURE — 36415 COLL VENOUS BLD VENIPUNCTURE: CPT | Performed by: OBSTETRICS & GYNECOLOGY

## 2021-03-30 PROCEDURE — 99207 PR PRENATAL VISIT: CPT | Performed by: OBSTETRICS & GYNECOLOGY

## 2021-03-30 ASSESSMENT — MIFFLIN-ST. JEOR: SCORE: 1881.1

## 2021-03-30 NOTE — PROGRESS NOTES
"CC: Here for routine prenatal visit @ 11w1d   HPI:  Had consult with diabetic educators: -101, 1 hr PP <140; has not started insulin as yet; feels she has a good handle on diet now  Wants NIPT with Gender (in envelope for reveal)    PE: /74 (BP Location: Right arm, Patient Position: Chair, Cuff Size: Adult Large)   Pulse 94   Temp 99.2  F (37.3  C) (Tympanic)   Resp 18   Ht 1.626 m (5' 4\")   Wt 121.1 kg (267 lb)   LMP  (LMP Unknown)   BMI 45.83 kg/m     See OB flowsheet      A:  1. Multigravida of advanced maternal age in first trimester    - [facility] FETAL MED [facility] REFERRAL-PREGNANCY    2. Type 2 diabetes mellitus without complication, unspecified whether long term insulin use (H)    - MAT FETAL MED CTR REFERRAL-PREGNANCY    MFM referral for L2 sonogram and fetal echocardiogram  F/u with diabetic education regarding BS, when to start insulin  Routine prenatal care  RTC 4 weeks.      Kaity Street M.D.     "

## 2021-03-30 NOTE — NURSING NOTE
"Initial /74 (BP Location: Right arm, Patient Position: Chair, Cuff Size: Adult Large)   Pulse 94   Temp 99.2  F (37.3  C) (Tympanic)   Resp 18   Ht 1.626 m (5' 4\")   Wt 121.1 kg (267 lb)   LMP  (LMP Unknown)   BMI 45.83 kg/m   Estimated body mass index is 45.83 kg/m  as calculated from the following:    Height as of this encounter: 1.626 m (5' 4\").    Weight as of this encounter: 121.1 kg (267 lb). .      "

## 2021-04-01 ENCOUNTER — TRANSCRIBE ORDERS (OUTPATIENT)
Dept: MATERNAL FETAL MEDICINE | Facility: CLINIC | Age: 36
End: 2021-04-01

## 2021-04-01 DIAGNOSIS — O24.111 PRE-EXISTING TYPE 2 DIABETES MELLITUS DURING PREGNANCY IN FIRST TRIMESTER: Primary | ICD-10-CM

## 2021-04-01 DIAGNOSIS — O26.90 PREGNANCY RELATED CONDITION, ANTEPARTUM: Primary | ICD-10-CM

## 2021-04-06 ENCOUNTER — VIRTUAL VISIT (OUTPATIENT)
Dept: EDUCATION SERVICES | Facility: CLINIC | Age: 36
End: 2021-04-06
Payer: COMMERCIAL

## 2021-04-06 DIAGNOSIS — E11.9 TYPE 2 DIABETES MELLITUS WITHOUT COMPLICATION, UNSPECIFIED WHETHER LONG TERM INSULIN USE (H): Primary | ICD-10-CM

## 2021-04-06 PROCEDURE — 98968 PH1 ASSMT&MGMT NQHP 21-30: CPT | Mod: 95

## 2021-04-06 NOTE — PROGRESS NOTES
Gestational Diabetes Follow-up  Type of Service: Telephone Visit    How would patient like to obtain AVS? Not needed    Subjective/Objective:    Mary Alice Jordan was called for a scheduled BG review. Last date of communication was: 3/12/21.    Gestational diabetes is being managed with diet, activity and medications    Taking diabetes medications:   yes:     Diabetes Medication(s)     Insulin       insulin detemir (LEVEMIR PEN) 100 UNIT/ML pen    Inject 19 Units Subcutaneous At Bedtime     Patient not taking: Reported on 4/6/2021     insulin NPH (NOVOLIN N FLEXPEN) 100 UNIT/ML injection    Inject 19 Units Subcutaneous At Bedtime May substitute with Humulin N (Kwik pen) if insurance prefers.      has not yet started NPH, wanted to wait until after today's visit     Estimated Date of Delivery: Oct 18, 2021    Blood Glucose/Ketone Log:    Date  Fasting Post Breakfast Post Lunch Post Supper   4/5  101 121 135 140   4/4  100 132 127 141   4/3  - - - -   4/2  97 123 135 132   4/1  103 132 129 137   3/31  105 127 138 136   3/30  102 136 140 131     Blood Glucose/Ketone Log:    Date Ketones Fasting Post Breakfast Post Lunch Post Supper   3/29  101 125 137 151   3/28  - 125 136 132   3/27  104 131 137 129   3/26  101 116 123 132   3/25  95 119 125 123   3/24  107 123 129 123   3/23  104 119 120 131         Assessment:    Ketones: NA.   Fasting blood glucoses: 8% in target.  After breakfast: 100% in target.  Before lunch: -% in target.  After lunch: 100% in target.  Before dinner: -% in target.  After dinner: 86% in target.    Patient feels that it has taken some time to get settled into meal plan. Is wondering how detrimental it would be to have a small dessert (piece of pie or 1/2 a cookie) at a celebration - explained that we want to avoid prolonged hyperglycemia, occasional spikes, although potentially harmful, would be less concerning. Recommended she try her best to follow the meal plan where she can but it's not the  end of the world if she sees an infrequent spike in blood sugar above goal. Also discussed that if diet plan is too restrictive for patient, we can utilize insulin with meals to give her some more flexibility. She is agreeable but feels things are going OK for now. Her OB has also discussed the likelihood that she will need insulin with meals at some point during this pregnancy. She is willing to start insulin, just picked it up 2 days ago due to insurance issues and was waiting to start until after today's visit. Recommended she initiated insulin tonight at prescribed dose.     Patient is not currently utilizing [employer] CGMS - it was not covered by her previous insurance. Discussed that this may be a good tool if/when we need her to check blood sugars more often but she can hold off on utilizing for now. She is agreeable.     Plan/Response:  Continue to check BG 4 times daily (fasting and one hour(s) after each meal).  Recommend that patient begin NPH insulin 19 units at HS tonight.  Follow-up in 1 week if fasting numbers consistently <95 mg/dL after starting insulin, follow up on Friday if fasting blood sugars remain elevated    Savannah Aldridge, RD, LD, [facility]   Time Spent: 28 minutes    Any diabetes medication dose changes were made via the CDE Protocol and Collaborative Practice Agreement with the patient's OB/GYN provider. A copy of this encounter was shared with the provider.

## 2021-04-07 ENCOUNTER — TELEPHONE (OUTPATIENT)
Dept: OBGYN | Facility: CLINIC | Age: 36
End: 2021-04-07

## 2021-04-07 NOTE — TELEPHONE ENCOUNTER
Pt notified of below.  Pt reports understanding.  Did not reveal gender to patient.  Gender placed in envelope at  for patient to  today.    Pt does not have further questions or concerns.    Janelle Maldonado   [facility]  RN

## 2021-04-07 NOTE — TELEPHONE ENCOUNTER
Results received from [facility] testing in [place] triage.    Testing done:  Innatal Prenatal Screen    Action:  Left message for patient to call back to report NORMAL results.    Gender:  Will ask patient if they wish to know the gender.   Patient may want gender in envelope to - will double check    Janelle Maldonado RN

## 2021-04-08 LAB — LAB SCANNED RESULT: NORMAL

## 2021-04-12 ENCOUNTER — MYC MEDICAL ADVICE (OUTPATIENT)
Dept: EDUCATION SERVICES | Facility: CLINIC | Age: 36
End: 2021-04-12

## 2021-04-12 NOTE — TELEPHONE ENCOUNTER
Gestational Diabetes Follow-up    Subjective/Objective:    Mary Alice Jordan sent in blood glucose log for review. Last date of communication was: 4/6.    Gestational diabetes is being managed with medications    Taking diabetes medications:   yes:     Diabetes Medication(s)     Insulin       insulin detemir (LEVEMIR PEN) 100 UNIT/ML pen    Inject 19 Units Subcutaneous At Bedtime     Patient not taking: Reported on 4/6/2021     insulin NPH (NOVOLIN N FLEXPEN) 100 UNIT/ML injection    Inject 19 Units Subcutaneous At Bedtime May substitute with Humulin N (Kwik pen) if insurance prefers.          Estimated Date of Delivery: Oct 18, 2021    BG/Food Log:       Assessment:    Ketones: neg.   Fasting blood glucoses: 100% in target.  After breakfast: 75% in target.  After lunch: 100% in target.  After dinner: 100% in target.    Since starting NPH at bed, her FBS has improved nicely.     Plan/Response:  No changes in the patient's current treatment plan.  Follow-up on 4/15 via [employer].   [employer] response sent to patient.     SAKSHI Tang CDE      Any diabetes medication dose changes were made via the CDE Protocol and Collaborative Practice Agreement with the patient's OB/GYN provider. A copy of this encounter was shared with the provider.

## 2021-04-13 ENCOUNTER — NURSE TRIAGE (OUTPATIENT)
Dept: NURSING | Facility: CLINIC | Age: 36
End: 2021-04-13

## 2021-04-13 NOTE — TELEPHONE ENCOUNTER
"Patient reports she is having tooth pain, wants to know if Orajel is safe to use during pregnancy. Advised that Benzocaine is the main ingredient and that has been assigned as a pregnancy category C, which means there hasn't been known studies and data performed during human pregnancy.     Advised it would be best to avoid during pregnancy. Patient verbalized understanding and had no further questions.    Zofia Rodriguez RN/M [facility] Nurse Advisors      Additional Information    Negative: Drug overdose and nurse unable to answer question    Negative: Caller requesting information not related to medicine    Negative: Caller requesting a prescription for Strep throat and has a positive culture result    Negative: Rash while taking a medication or within 3 days of stopping it    Negative: Immunization reaction suspected    Negative: [1] Asthma AND [2] having symptoms of asthma (cough, wheezing, etc)    Negative: Took another person's prescription drug    Negative: MORE THAN A DOUBLE DOSE of a prescription or over-the-counter (OTC) drug    Negative: [1] DOUBLE DOSE (an extra dose or lesser amount) of over-the-counter (OTC) drug AND [2] any symptoms (e.g., dizziness, nausea, pain, sleepiness)    Negative: [1] DOUBLE DOSE (an extra dose or lesser amount) of prescription drug AND [2] any symptoms (e.g., dizziness, nausea, pain, sleepiness)    Negative: [1] DOUBLE DOSE (an extra dose or lesser amount) of prescription drug AND [2] NO symptoms (Exception: a double dose of antibiotics)    Negative: Diabetes drug error or overdose (e.g., insulin or extra dose)    Negative: [1] Request for URGENT new prescription or refill of \"essential\" medication (i.e., likelihood of harm to patient if not taken) AND [2] triager unable to fill per unit policy    Negative: [1] Prescription not at pharmacy AND [2] was prescribed today by PCP    Negative: Pharmacy calling with prescription questions and triager unable to answer " question    Negative: Caller has urgent medication question about med that PCP prescribed and triager unable to answer question    Negative: Caller has NON-URGENT medication question about med that PCP prescribed and triager unable to answer question    Negative: Caller requesting a NON-URGENT new prescription or refill and triager unable to refill per unit policy    Negative: Caller has medication question about med not prescribed by PCP and triager unable to answer question (e.g., compatibility with other med, storage)    Negative: [1] DOUBLE DOSE (an extra dose or lesser amount) of over-the-counter (OTC) drug AND [2] NO symptoms    Negative: [1] DOUBLE DOSE (an extra dose or lesser amount) of antibiotic drug AND [2] NO symptoms    Negative: Caller has medication question only, adult not sick, and triager answers question    Negative: Caller has medication question, adult has minor symptoms, caller declines triage, and triager answers question    Caller requesting information about medication during pregnancy; adult is not ill and triager answers question    Protocols used: MEDICATION QUESTION CALL-A-

## 2021-04-15 ENCOUNTER — MYC MEDICAL ADVICE (OUTPATIENT)
Dept: EDUCATION SERVICES | Facility: CLINIC | Age: 36
End: 2021-04-15

## 2021-04-15 NOTE — TELEPHONE ENCOUNTER
Gestational Diabetes Follow-up    Subjective/Objective:    Mary Alice Jordan sent in blood glucose log for review. Last date of communication was: 4-12-21.    Gestational diabetes is being managed with diet, activity and medications    Taking diabetes medications:   yes:     Diabetes Medication(s)     Insulin       insulin detemir (LEVEMIR PEN) 100 UNIT/ML pen    Inject 19 Units Subcutaneous At Bedtime     Patient not taking: Reported on 4/6/2021     insulin NPH (NOVOLIN N FLEXPEN) 100 UNIT/ML injection    Inject 19 Units Subcutaneous At Bedtime May substitute with Humulin N (Kwik pen) if insurance prefers.          Estimated Date of Delivery: Oct 18, 2021    BG/Food Log:         Assessment:    Ketones: Negative.   Fasting blood glucoses: 100% in target.  After breakfast: 100% in target.  After lunch: 100% in target.  After dinner: 100% in target.    Plan/Response:  No changes in the patient's current treatment plan.  Follow-up in 1 week.  MyChart response.    Rosaura Lin RN, CDCES      Any diabetes medication dose changes were made via the CDE Protocol and Collaborative Practice Agreement with the patient's OB/GYN provider. A copy of this encounter was shared with the provider.

## 2021-04-22 ENCOUNTER — MYC MEDICAL ADVICE (OUTPATIENT)
Dept: OBGYN | Facility: CLINIC | Age: 36
End: 2021-04-22
Payer: COMMERCIAL

## 2021-04-22 ENCOUNTER — MYC MEDICAL ADVICE (OUTPATIENT)
Dept: EDUCATION SERVICES | Facility: CLINIC | Age: 36
End: 2021-04-22

## 2021-04-22 NOTE — TELEPHONE ENCOUNTER
Type 2  Diabetes + pregnancy Follow-up    Subjective/Objective:    Mary Alice Jordan sent in blood glucose log for review. Last date of communication was: 4/15/21.    Diabetes is being managed with diet, activity and medications    Taking diabetes medications:   yes:     Diabetes Medication(s)     Insulin       insulin NPH (NOVOLIN N FLEXPEN) 100 UNIT/ML injection    Inject 19 Units Subcutaneous At Bedtime May substitute with Humulin N (Kwik pen) if insurance prefers.          Estimated Date of Delivery: Oct 18, 2021    BG/Food Log:         Assessment:    Ketones: NA.   Fasting blood glucoses: 100% in target.  After breakfast: 100% in target.  After lunch: 88% in target.  After dinner: 88% in target.    Plan/Response:  No changes in the patient's current treatment plan.  Follow-up in 1 week.  See [employer] message for patient communications    Savannah Aldridge RD, LD, [facility]     Any diabetes medication dose changes were made via the CDE Protocol and Collaborative Practice Agreement with the patient's OB/GYN provider. A copy of this encounter was shared with the provider.

## 2021-04-26 ENCOUNTER — PRENATAL OFFICE VISIT (OUTPATIENT)
Dept: OBGYN | Facility: CLINIC | Age: 36
End: 2021-04-26

## 2021-04-26 VITALS
SYSTOLIC BLOOD PRESSURE: 131 MMHG | TEMPERATURE: 99.5 F | WEIGHT: 270 LBS | RESPIRATION RATE: 18 BRPM | BODY MASS INDEX: 46.1 KG/M2 | HEART RATE: 101 BPM | DIASTOLIC BLOOD PRESSURE: 81 MMHG | HEIGHT: 64 IN

## 2021-04-26 DIAGNOSIS — Z34.82 PRENATAL CARE, SUBSEQUENT PREGNANCY IN SECOND TRIMESTER: Primary | ICD-10-CM

## 2021-04-26 PROCEDURE — 99207 PR PRENATAL VISIT: CPT | Performed by: OBSTETRICS & GYNECOLOGY

## 2021-04-26 RX ORDER — ASPIRIN 81 MG/1
81 TABLET ORAL 2 TIMES DAILY
COMMUNITY
End: 2021-11-08

## 2021-04-26 ASSESSMENT — MIFFLIN-ST. JEOR: SCORE: 1889.71

## 2021-04-26 NOTE — NURSING NOTE
"Initial /81 (BP Location: Right arm, Patient Position: Chair, Cuff Size: Adult Large)   Pulse 101   Temp 99.5  F (37.5  C) (Tympanic)   Resp 18   Ht 1.626 m (5' 4\")   Wt 122.5 kg (270 lb)   LMP  (LMP Unknown)   BMI 46.35 kg/m   Estimated body mass index is 46.35 kg/m  as calculated from the following:    Height as of this encounter: 1.626 m (5' 4\").    Weight as of this encounter: 122.5 kg (270 lb). .      "

## 2021-04-26 NOTE — PROGRESS NOTES
"CC: Here for routine prenatal visit @ 15w0d   HPI:  Feeling well; reports some butterfly like movements; has appts with M for ultrasound and consultation    PE: /81 (BP Location: Right arm, Patient Position: Chair, Cuff Size: Adult Large)   Pulse 101   Temp 99.5  F (37.5  C) (Tympanic)   Resp 18   Ht 1.626 m (5' 4\")   Wt 122.5 kg (270 lb)   LMP  (LMP Unknown)   BMI 46.35 kg/m     See OB flowsheet      A:  1. Prenatal care, subsequent pregnancy in second trimester        Routine prenatal care  RTC 4 weeks.      Kaity Street M.D.     "

## 2021-05-04 ENCOUNTER — MYC MEDICAL ADVICE (OUTPATIENT)
Dept: EDUCATION SERVICES | Facility: CLINIC | Age: 36
End: 2021-05-04

## 2021-05-04 NOTE — TELEPHONE ENCOUNTER
Gestational Diabetes Follow-up    Subjective/Objective:    Mary Alice Jordan sent in blood glucose log for review. Last date of communication was: 4/22/2021.    Gestational diabetes is being managed with medications    Taking diabetes medications:   yes:     Diabetes Medication(s)     Insulin       insulin NPH (NOVOLIN N FLEXPEN) 100 UNIT/ML injection    Inject 19 Units Subcutaneous At Bedtime May substitute with Humulin N (Kwik pen) if insurance prefers.          Estimated Date of Delivery: Oct 18, 2021    BG/Food Log:         Assessment:    Ketones: neg.   Fasting blood glucoses: 92% in target.  After breakfast: 100% in target.  After lunch: 100% in target.  After dinner: 82% in target.    Plan/Response:  No changes in the patient's current treatment plan.  Follow-up in 1 week.    SAKSHI Gallagher CDE    Any diabetes medication dose changes were made via the CDE Protocol and Collaborative Practice Agreement with the patient's OB/GYN provider. A copy of this encounter was shared with the provider.

## 2021-05-12 ENCOUNTER — MYC MEDICAL ADVICE (OUTPATIENT)
Dept: EDUCATION SERVICES | Facility: CLINIC | Age: 36
End: 2021-05-12

## 2021-05-12 DIAGNOSIS — E11.9 TYPE 2 DIABETES MELLITUS WITHOUT COMPLICATION, WITHOUT LONG-TERM CURRENT USE OF INSULIN (H): ICD-10-CM

## 2021-05-12 RX ORDER — HUMAN INSULIN 100 [IU]/ML
23 INJECTION, SUSPENSION SUBCUTANEOUS AT BEDTIME
Qty: 30 ML | Refills: 2 | Status: SHIPPED | OUTPATIENT
Start: 2021-05-12 | End: 2021-05-27

## 2021-05-12 NOTE — TELEPHONE ENCOUNTER
Gestational Diabetes Follow-up    Subjective/Objective:    Mary Alice Jordan sent in blood glucose log for review. Last date of communication was: 5-4-21.    Gestational diabetes is being managed with diet, activity and medications    Taking diabetes medications:   yes:     Diabetes Medication(s)     Insulin       insulin NPH (NOVOLIN N FLEXPEN) 100 UNIT/ML injection    Inject 19 Units Subcutaneous At Bedtime May substitute with Humulin N (Kwik pen) if insurance prefers.          Estimated Date of Delivery: Oct 18, 2021    BG/Food Log:         Assessment:    Ketones: Negative.   Fasting blood glucoses: 75% in target. ( 3 of the in target are at 95)  After breakfast: 75% in target.  After lunch: 88% in target.  After dinner: 75% in target.    Fasting numbers increasing, so would benefit from more NPH insulin at bedtime.    Plan/Response:  Recommend increase to insulin - NPH 0-0-0-19 to 0-0-0-23.  Check ketones weekly.  Follow-up on Monday.  MyChart response sent.    Rosaura Lin RN, [facility]      Any diabetes medication dose changes were made via the CDE Protocol and Collaborative Practice Agreement with the patient's OB/GYN provider. A copy of this encounter was shared with the provider.

## 2021-05-19 ENCOUNTER — MYC MEDICAL ADVICE (OUTPATIENT)
Dept: EDUCATION SERVICES | Facility: CLINIC | Age: 36
End: 2021-05-19

## 2021-05-19 NOTE — TELEPHONE ENCOUNTER
Gestational Diabetes Follow-up    Subjective/Objective:    Mary Alice Jordan sent in blood glucose log for review. Last date of communication was: 5/12/2021.    Gestational diabetes is being managed with diet, activity and medications    Taking diabetes medications:   yes:     Diabetes Medication(s)     Insulin       insulin NPH (NOVOLIN N FLEXPEN) 100 UNIT/ML injection    Inject 23 Units Subcutaneous At Bedtime DOSE updated for next refill. May substitute with Humulin N (Kwik pen) if insurance prefers.          Estimated Date of Delivery: Oct 18, 2021    BG/Food Log:       Assessment:    Ketones: na.   Fasting blood glucoses: 100% in target.  After breakfast: 100% in target.  After lunch: 100% in target.  After dinner: 83% in target.    Plan/Response:  No changes in the patient's current treatment plan.  Follow-up in 1 week.    SAKSHI Gallagher CDE    Any diabetes medication dose changes were made via the CDE Protocol and Collaborative Practice Agreement with the patient's OB/GYN provider. A copy of this encounter was shared with the provider.

## 2021-05-20 ENCOUNTER — PRE VISIT (OUTPATIENT)
Dept: MATERNAL FETAL MEDICINE | Facility: CLINIC | Age: 36
End: 2021-05-20

## 2021-05-21 ENCOUNTER — OFFICE VISIT (OUTPATIENT)
Dept: MATERNAL FETAL MEDICINE | Facility: CLINIC | Age: 36
End: 2021-05-21
Attending: OBSTETRICS & GYNECOLOGY
Payer: COMMERCIAL

## 2021-05-21 ENCOUNTER — HOSPITAL ENCOUNTER (OUTPATIENT)
Dept: ULTRASOUND IMAGING | Facility: CLINIC | Age: 36
End: 2021-05-21
Attending: OBSTETRICS & GYNECOLOGY
Payer: COMMERCIAL

## 2021-05-21 DIAGNOSIS — O26.90 PREGNANCY RELATED CONDITION, ANTEPARTUM: ICD-10-CM

## 2021-05-21 DIAGNOSIS — O99.212 OBESITY AFFECTING PREGNANCY IN SECOND TRIMESTER: Primary | ICD-10-CM

## 2021-05-21 DIAGNOSIS — E11.9 TYPE 2 DIABETES MELLITUS WITHOUT COMPLICATION, UNSPECIFIED WHETHER LONG TERM INSULIN USE (H): ICD-10-CM

## 2021-05-21 DIAGNOSIS — O24.111 PRE-EXISTING TYPE 2 DIABETES MELLITUS DURING PREGNANCY IN FIRST TRIMESTER: ICD-10-CM

## 2021-05-21 DIAGNOSIS — O24.112 TYPE 2 DIABETES MELLITUS COMPLICATING PREGNANCY, ANTEPARTUM, SECOND TRIMESTER: ICD-10-CM

## 2021-05-21 DIAGNOSIS — O09.522 MULTIGRAVIDA OF ADVANCED MATERNAL AGE IN SECOND TRIMESTER: Primary | ICD-10-CM

## 2021-05-21 PROCEDURE — 99203 OFFICE O/P NEW LOW 30 MIN: CPT | Mod: 25 | Performed by: OBSTETRICS & GYNECOLOGY

## 2021-05-21 PROCEDURE — 76811 OB US DETAILED SNGL FETUS: CPT | Mod: 26 | Performed by: OBSTETRICS & GYNECOLOGY

## 2021-05-21 PROCEDURE — G0463 HOSPITAL OUTPT CLINIC VISIT: HCPCS

## 2021-05-21 PROCEDURE — 76811 OB US DETAILED SNGL FETUS: CPT

## 2021-05-21 PROCEDURE — 96040 HC GENETIC COUNSELING, EACH 30 MINUTES: CPT | Performed by: GENETIC COUNSELOR, MS

## 2021-05-21 PROCEDURE — G0463 HOSPITAL OUTPT CLINIC VISIT: HCPCS | Mod: 25

## 2021-05-21 NOTE — PROGRESS NOTES
Kaity Street M.D.  [facility]:      I saw your patient Mary Alice Jordan in consultation today.  Ms. Jordan is a 36 year old female  at 18 weeks 4 days with estimated date of delivery of 2021 based on first trimester ultrasound on 2021 that reports an EDC of 10/18/2021 at a GA of 7w1d.   She presents to ultrasound clinic for assessment fetal anatomy and M consultation for recommendations for care due to obesity (BMI 46.3 kg/m2), Type II DM, and AMA.   Prenatal labs are normal with NIPT reporting low risk for aneuploidy, ALT/AST and serum creatinine are 26/14 U/L and 0.49 mg/dl  Obstetrical history:  # 1 - Date: 07, Sex: Male, Weight: 3.317 kg (7 lb 5 oz), GA: 42w0d, Delivery:   # 2 - Date: current pregnancy  Class III obesity:  Ms. Jordan reports that she has met with nutritionist to discuss dietary modifications during pregnancy.   Type II DM:  Patient is currently using insulin NPH (Novolin N) 23 units at h.s. for management of her blood sugars. She reports measuring her capillary glycaemia once a day in fasting state with values between 80 and 95 mg/dl. Her postprandial blood sugars are usually measured one hour after meals and are below 140 mg/dl. She is reporting her blood sugars every week to the diabetes nurse educator and HbA1c on 2021 was 7.5%. results for EKG and urine protein/creatinine ratio are not available.  Obstructive sleep apnea: No evidence for evaluation of REYNOLD. Recommend clinical assessment and referral as indicated.     IMPRESSION REPORT AND PLAN:  Class III obesity:  Recommendations from IOM are to limit weight gain to 10-15 pounds for pregnancy. I have emphasized no weight gain as being beneficial to maternal wellbeing despite risk for lower infant birthweight. The concurrent comorbidities would benefit from no additional stress from maternal weight gain. I have discussed a regular exercise routine that could include walking, swimming,  dancing or other moderate physical activity of 30 minutes 5 times a week.  Type II DM: We discussed the issues regarding management of Type II DM and the risks associated with suboptimal control: including but not limited to development of DKA, coronary heart disease, CHF, preeclampsia, need for CD, fetal macrosomia, shoulder dystocia,  complications such as hypoglycemia, jaundice, electrolyte disorders and increased length of stay.   I discussed the importance of monitoring her blood sugars with target levels between 60 and 90 mg/dl fasting, less than 140 mg/dl at 1 hour or less than 120 mg/dl 2 hours postprandial. Ms. Jordan s blood sugars have consistently been below these levels as documented by blood sugar logs. Prior to pregnancy there appear to have been elevated values as documented by the first trimester HbA1c of 7.5%.  I discussed the need to increase her insulin based on normally increasing requirements during pregnancy. She may eventually require short acting insulin in order to achieve target blood sugar levels to avoid the fetal and maternal morbidity associated with hyperglycemia including fetal growth abnormalities, CD, shoulder dystocia, and adverse  outcomes (hypoglycemia, jaundice and prolonged hospital stay). This may require ongoing weekly or more frequent adjustments with the expectation that insulin requirements will continue to increase throughout pregnancy secondary to increasing insulin resistance throughout pregnancy. This wqill be managed with endocrinology.   We discussed recommendation for aneuploidy risk assessment, fetal comprehensive anatomy scan, fetal ECHO, fetal surveillance with growth scans every 4 weeks beginning in the second trimester and BPP 2 a week beginning at 32 weeks or as clinically indicated. I have recommended delivery at 36 to 39 weeks or as clinically indicated if blood sugar not well controlled, or if there is poorly controlled blood pressure or  evidence of other vasculopathy. Ms. Jordan has already had a low risk NIPT for aneuploidy risk assessment and ultrasound today showed normal anatomy of the structures that were visualized. Ms. Jordan will have to return to [facility] ultrasound clinic in 4 weeks for fetal growth and anatomy assessment of structures not clearly visualized today. She will also need a fetal ECHO at 22 -24 weeks secondary to her diagnoses of Type II DM.  We discussed risks of developing preeclampsia and need to monitor for its development throughout pregnancy. Although improved blood sugar control can decrease the risk, it does not completely remove the risk for preeclampsia. The risk can be further reduced by use of LDA beginning at 12 weeks and continued through until delivery. We discussed obtaining baseline assessment of urine protein, EKG and have recommended a maternal ECHO exam if there are any abnormalities on the EKG.  Obstructive sleep apnea: Ms. Jordan has not had any documented evaluation for REYNOLD.  Summary of recommendations for management:    Consultations:    1) Continue insulin management with endocrinology.  2) Anesthesia consult in third trimester or as indicated.    Labs and surveillance:    1) Maintain target levels for blood sugar of equal or less than 90 FBS and 120 2hPP or 140 1hPP. HbA1c levels are of limited utility in pregnancy.   2) Obtain baseline urine PCR, obtain maternal EKG, maternal ECHO to evaluate heart function if EKG is abnormal.   3) Follow up anatomy scan in 4 weeks and fetal ECHO in 4 weeks with pediatric cardiology.   4) Monthly growth scans.  5) Semiweekly BPP beginning at 32 weeks until delivery by 37-39 weeks depending on glycemic control.      Medications and treatment:    1) Recommend use of LDA 81 mg twice a day.  2) Regular physical activity 30 minutes a day and 5 days a week.  3) Recommend no weight gain but not more than 10 lbs for entire pregnancy.  4) Recommendations for delivery include  delivery by 37-39 weeks. If blood sugar sub optimally controlled delivery may occur sooner depending on clinical conditions as early as 36 weeks. If patient develops superimposed preeclampsia, delivery may occur even earlier depending on severity of disease and according to standard protocols.     The patient had all her questions answered. She voiced understanding of the plan of care and her satisfaction with our care today. Thank you for the opportunity to participate in the care of Ms. Jordan. Please do not hesitate to contact us if you may have any questions or concerns.      I spent 5 minutes prior to the visit preparing to see the patient (reviewing medical records and tests).      I spent 20 minutes face-face-to-face with the patient during the visit with the majority (>50%) spent on counseling and coordination of care with the patient and/or family members.     I spent 10 minutes after the visit with the patient documenting the visit in the electronic health record and/or communicating with other health care professionals and/or care coordination.    Total time spent on today s date of service: 35 minutes.    Sincerely,  Leonard Corcoran M.D.  Maternal Fetal Medicine

## 2021-05-21 NOTE — NURSING NOTE
Mary Alice presents to MFM for GC/L2/MFM consult for history of DM2, obesity and AMA. Dr Corcoran met with patient. Plan for fetal echo and RL2. Discharged ambulatory and stable.    Yolanda Dee RN

## 2021-05-21 NOTE — PROGRESS NOTES
[facility]  Genetic Counseling Consult    Patient:  Mary Alice Jordan YOB: 1985   Date of Service:  21      Mary Alice Jordan was seen at the [facility] for genetic consultation as part of her appointment for comprehensive ultrasound.  The indication for genetic counseling is advanced maternal age and maternal diabetes.       Impression/Plan:   1. Mary Alice had a comprehensive (level II) ultrasound today.  Please see the ultrasound report for further details.    2. Mary Alice underwent Innatal NIPT earlier in this pregnancy, which was low risk male. Mary Alice declines genetic amniocentesis.     3. Mary Alice was found to have type II diabetes mellitus and is taking insulin. We reviewed plan for fetal echocardiogram. Mary Alice had an MFM consultation today, please see corresponding report for further details regarding ongoing pregnancy management recommendations.     Pregnancy History:   /Parity:    Age at Delivery: 36 year old  SABINO: 10/18/2021, by Ultrasound  Gestational Age: 18w4d    No significant complications or exposures were reported in the current pregnancy.    Mary Alice s pregnancy history is significant for one term vaginal delivery.    Medical History:   Mary Alice was found to have type II diabetes mellitus and is taking insulin and baby ASA. We reviewed that maternal diabetes increases that chance of birth defects in pregnancy, especially if poorly controlled in the first trimester. These birth defects can include spinal cord defects (spina bifida), heart defects, skeletal defects, and defects in the urinary, reproductive, and digestive systems. Diabetes in the pregnancy can also lead to complications such as pre-eclampsia, polyhydramnios, and  delivery. Typically fetal echocardiogram is recommended as a screenign tool for congenital heart defects in the current pregnancy. Mary Alice had an MFM consultation today,  please see corresponding report for further details regarding ongoing pregnancy management recommendations.        Family History:   A three-generation pedigree was obtained, and is scanned under the  Media  tab.   The following significant findings were reported by Mary Alice:    Mary Alice was found to have type II diabetes mellitus diagnosed early in this pregnancy. We reviewed that diabetes is thought to be multifactorial, resulting from a combination of genetic and environmental factors. The couple was encouraged to share this family history information with their pediatrician.   Mary Alice's partner, Tino is 43 and healthy.   Tino reported a significant family history of cancer in his father with gallbladder cancer in his 60's, paternal uncles, and maternal aunt, however specific details regarding their cancer diagnoses were not known today. We discussed how most cancer seen in families occurs sporadically, but about 5-10% may be due to an underlying genetic etiology. Tino was encouraged to share this family history information with his primary care provider to ensure appropriate screening.     Otherwise, the reported family history is negative for multiple miscarriages, stillbirths, birth defects, intellectual disability, known genetic conditions, and consanguinity.       Carrier Screening:   The patient reports that she is of  ancestry:     Cystic fibrosis is an autosomal recessive genetic condition that occurs with increased frequency in individuals of  ancestry and carrier screening for this condition is available.  In addition,  screening in the [facility] includes cystic fibrosis.    The patient reports that the father of the pregnancy is of Black ethnic background:     The hemoglobinopathies are a group of genetic blood diseases that occur with increased frequency in individuals of Black ethnic background and carrier screening for these conditions is available.  Carrier screening for the  hemoglobinopathies includes a CBC with red blood cell indices, a ferritin level, and a quantitative hemoglobin electrophoresis or HPLC.  In addition,  screening in the [facility] includes many of the hemoglobinopathies.      Expanded carrier screening for mutations in a large panel of genes associated with autosomal recessive conditions including cystic fibrosis, spinal muscular atrophy, and others, is now available.      The couple did not elect to pursue the carrier screening options reviewed today.  She was provided with a brochure about her testing options and is aware these will remain available.        Risk Assessment for Chromosome Conditions:   We explained that the risk for fetal chromosome abnormalities increases with maternal age. We discussed specific features of common chromosome abnormalities, including Down syndrome, trisomy 13, trisomy 18, and sex chromosome trisomies.      - At age 36 at midtrimester, the risk to have a baby with Down syndrome is 1 in 129.     - At age 36 at midtrimester, the risk to have a baby with any chromosome abnormality is 1 in 65.       Mary Alice had maternal serum screening earlier in pregnancy.     Non-invasive Prenatal Testing (NIPT)    Maternal plasma cell-free DNA testing    Screens for fetal trisomy 21, trisomy 13, trisomy 18, and sex chromosome aneuploidy    First trimester ultrasound with nuchal translucency and nasal bone assessment was not performed in this pregnancy, to our knowledge.    Mary Alice had an Innatal NIPT earlier in pregnancy; we reviewed the results today, which are normal for chromosome 13, chromosome 18, chromosome 21, and sex chromosomes (no aneuploidy detected)    Given the accuracy of this test, these results greatly decrease the chance for certain fetal chromosome abnormalities    We discussed the limitations of normal NIPT results    MSAFP (after 15 weeks for open neural tube defect screening) results were not available for our review  today.         Testing Options:   We discussed the following options:   Genetic Amniocentesis    Invasive procedure typically performed in the second trimester by which amniotic fluid is obtained for the purpose of chromosome analysis and/or other prenatal genetic analysis    Diagnostic results; >99% sensitivity for fetal chromosome abnormalities    AFAFP measurement tests for open neural tube defects     Comprehensive (Level II) ultrasound: Detailed ultrasound performed between 18-22 weeks gestation to screen for major birth defects and markers for aneuploidy.      We reviewed the benefits and limitations of this testing.  Screening tests provide a risk assessment specific to the pregnancy for certain fetal chromosome abnormalities, but cannot definitively diagnose or exclude a fetal chromosome abnormality.  Follow-up genetic counseling and consideration of diagnostic testing is recommended with any abnormal screening result.     Diagnostic tests carry inherent risks- including risk of miscarriage- that require careful consideration.  These tests can detect fetal chromosome abnormalities with greater than 99% certainty.  Results can be compromised by maternal cell contamination or mosaicism, and are limited by the resolution of cytogenetic G-banding technology.  There is no screening nor diagnostic test that can detect all forms of birth defects or mental disability.    It was a pleasure to be involved with Mary Alice shane care. Face-to-face time of the meeting was 20 minutes.    Silvana Connell MS, [facility]  Licensed Genetic Counselor  [facility]  Maternal Fetal Medicine  Ph: [phone]  [email or web address]

## 2021-05-23 ENCOUNTER — NURSE TRIAGE (OUTPATIENT)
Dept: NURSING | Facility: CLINIC | Age: 36
End: 2021-05-23

## 2021-05-23 NOTE — TELEPHONE ENCOUNTER
Patient says she has gestational diabetes and forgot her medication last night. Current glucose level is 106. Advised to take the medication tonight and not this morning and since glucose level is good so glucose does not get too low.    Patient reports symptoms of yeast infection that started yesterday and wants to know OTC medication she can try. She reports feeling a little uncomfortable and itchy; difficult to sleep last night. Advised to try Monistat or Gyne-Lotrimin.     Patient verbalized understanding.      Reason for Disposition    Caller requesting information about medication during pregnancy; adult is not ill AND triager answers question    Additional Information    Negative: Caller has medication question only, adult not sick, and triager answers question    Negative: Caller has medication question, adult has minor symptoms, caller declines triage, AND triager answers question    Protocols used: MEDICATION QUESTION CALL-A-

## 2021-05-27 ENCOUNTER — MYC MEDICAL ADVICE (OUTPATIENT)
Dept: EDUCATION SERVICES | Facility: CLINIC | Age: 36
End: 2021-05-27

## 2021-05-27 ENCOUNTER — PRENATAL OFFICE VISIT (OUTPATIENT)
Dept: OBGYN | Facility: CLINIC | Age: 36
End: 2021-05-27
Payer: COMMERCIAL

## 2021-05-27 VITALS
HEIGHT: 64 IN | BODY MASS INDEX: 46.1 KG/M2 | SYSTOLIC BLOOD PRESSURE: 131 MMHG | WEIGHT: 270 LBS | RESPIRATION RATE: 18 BRPM | HEART RATE: 92 BPM | TEMPERATURE: 98.4 F | DIASTOLIC BLOOD PRESSURE: 73 MMHG

## 2021-05-27 DIAGNOSIS — E11.9 TYPE 2 DIABETES MELLITUS WITHOUT COMPLICATION, WITHOUT LONG-TERM CURRENT USE OF INSULIN (H): ICD-10-CM

## 2021-05-27 DIAGNOSIS — Z34.82 PRENATAL CARE, SUBSEQUENT PREGNANCY IN SECOND TRIMESTER: Primary | ICD-10-CM

## 2021-05-27 DIAGNOSIS — E11.9 TYPE 2 DIABETES MELLITUS WITHOUT COMPLICATION, UNSPECIFIED WHETHER LONG TERM INSULIN USE (H): ICD-10-CM

## 2021-05-27 DIAGNOSIS — O24.119 PREGNANCY COMPLICATED BY PRE-EXISTING TYPE 2 DIABETES: Primary | ICD-10-CM

## 2021-05-27 PROCEDURE — 99207 PR PRENATAL VISIT: CPT | Performed by: OBSTETRICS & GYNECOLOGY

## 2021-05-27 RX ORDER — HUMAN INSULIN 100 [IU]/ML
INJECTION, SUSPENSION SUBCUTANEOUS
Qty: 30 ML | Refills: 2 | Status: SHIPPED | OUTPATIENT
Start: 2021-05-27 | End: 2021-08-10

## 2021-05-27 ASSESSMENT — MIFFLIN-ST. JEOR: SCORE: 1889.71

## 2021-05-27 NOTE — NURSING NOTE
"Initial /73 (BP Location: Right arm, Patient Position: Chair, Cuff Size: Adult Large)   Pulse 92   Temp 98.4  F (36.9  C) (Tympanic)   Resp 18   Ht 1.626 m (5' 4\")   Wt 122.5 kg (270 lb)   LMP  (LMP Unknown)   Breastfeeding No   BMI 46.35 kg/m   Estimated body mass index is 46.35 kg/m  as calculated from the following:    Height as of this encounter: 1.626 m (5' 4\").    Weight as of this encounter: 122.5 kg (270 lb). .      "

## 2021-05-27 NOTE — PROGRESS NOTES
"CC: Here for routine prenatal visit @ 19w3d   HPI:  On insulin and continues to have to increase dose weekly; level 2 sonogram WNL; has echo next week; taking baby ASA daily    PE: /73 (BP Location: Right arm, Patient Position: Chair, Cuff Size: Adult Large)   Pulse 92   Temp 98.4  F (36.9  C) (Tympanic)   Resp 18   Ht 1.626 m (5' 4\")   Wt 122.5 kg (270 lb)   LMP  (LMP Unknown)   Breastfeeding No   BMI 46.35 kg/m     See OB flowsheet      A:  1. Prenatal care, subsequent pregnancy in second trimester      2. Type 2 diabetes mellitus without complication, unspecified whether long term insulin use (H)        Routine prenatal care  RTC 4 weeks.      Kaity Street M.D.       "

## 2021-05-27 NOTE — CONFIDENTIAL NOTE
T2D in pregnancy Diabetes Follow-up    Subjective/Objective:    Mary Alice Jordan sent in blood glucose log for review. Last date of communication was: 5/19.    T2D is being managed with medications    Taking diabetes medications:   yes:     Diabetes Medication(s)     Insulin       insulin NPH (NOVOLIN N FLEXPEN) 100 UNIT/ML injection    Inject 23 Units Subcutaneous At Bedtime DOSE updated for next refill. May substitute with Humulin N (Kwik pen) if insurance prefers.          Estimated Date of Delivery: Oct 18, 2021    BG/Food Log:   The last three days my morning numbers have been high. I have a light snack before bed, which usually is a sugar free jello cup or sugar free pudding.  So I am confused why they would be so high.       Let me know what you would like to do moving forward.          Assessment:    Ketones: N.   Fasting blood glucoses: 57% in target.  After breakfast: 100% in target.  Before lunch: x% in target.  After lunch: 50% in target.  Before dinner: x% in target.  After dinner: 33% in target.    Plan/Response:  Recommend increase to insulin - NPH from 0-0-0-23 -> 0-0-0-27.  Follow-up in 3-4 days. May need to add mealtime.    Reply:  MADYSON Wheat,  You're doing great!   Good question about the higher numbers.   As baby (and placenta) grow, you make even MORE mom hormones that interfere with the insulin action.   It's normal to need to increase insulin once or twice each week as pregnancy progresses to keep up with the hormones and keep numbers in goal.   Very normal!    One other thought, again as pregnancy progresses, you might benefit from a more balanced snack at bedtime with carb + protein.   The SF jello doesn't have either, and the pudding just carb. Best plan for helping morning numbers is 15-30 g carb + protein right at bedtime, like crackers and cheese, fruit and cottage cheese, or half a meat sandwich for instance. Maybe worth a try :)    Since numbers are creeping up, that's the indicator to  increase insulin. Let's have you increase to 22 units starting tonight to get through the weekend.     Numbers after lunch and dinner are elevated at times, again, mom hormones hard at work!   I'd suggest aiming for just 45 grams of carb and lunch and at dinner (if you're currently eating more) to see if that helps.     Thanks Mary Alice! Can you please send again next Tuesday or Wednesday?   Have a good weekend! Stefany Pierce RD, PAMELA, SARAH    TYPO'd so replied again:  Oh my gosh Mary Alice,   Where did I get 19? Sorry about that! I may have looked at an old note.   If you are currently taking 23 units at bedtime, please increase to 27 starting tonight.   Thanks! Stefany Pierce RD, PAMELA, PAMELA Bowser RD, SARAH    Any diabetes medication dose changes were made via the CDE Protocol and Collaborative Practice Agreement with the patient's OB/GYN provider.

## 2021-05-29 ENCOUNTER — RECORDS - HEALTHEAST (OUTPATIENT)
Dept: ADMINISTRATIVE | Facility: CLINIC | Age: 36
End: 2021-05-29

## 2021-05-30 ENCOUNTER — RECORDS - HEALTHEAST (OUTPATIENT)
Dept: ADMINISTRATIVE | Facility: CLINIC | Age: 36
End: 2021-05-30

## 2021-06-01 ENCOUNTER — MYC MEDICAL ADVICE (OUTPATIENT)
Dept: EDUCATION SERVICES | Facility: CLINIC | Age: 36
End: 2021-06-01

## 2021-06-01 NOTE — TELEPHONE ENCOUNTER
Gestational Diabetes Follow-up    Subjective/Objective:    Mary Alice Jordan sent in blood glucose log for review. Last date of communication was: 5/27/2021.    Gestational diabetes is being managed with medications    Taking diabetes medications:   yes:     Diabetes Medication(s)     Insulin       insulin NPH (NOVOLIN N FLEXPEN) 100 UNIT/ML injection    Inject up to 45 units at bedtime. May substitute with Humulin N (Kwik pen) if insurance prefers.          Estimated Date of Delivery: Oct 18, 2021    BG/Food Log:       Assessment since 5/27:    Ketones: neg.   Fasting blood glucoses: 80% in target.  After breakfast: 100% in target.  After lunch: 100% in target.  After dinner: 50% in target.    Will watch dinner numbers more closely    Plan/Response:  No changes in the patient's current treatment plan.  Follow-up in 1 week.    SAKSHI Gallagher CDCES    Any diabetes medication dose changes were made via the CDE Protocol and Collaborative Practice Agreement with the patient's OB/GYN provider. A copy of this encounter was shared with the provider.

## 2021-06-02 ENCOUNTER — HOSPITAL ENCOUNTER (OUTPATIENT)
Dept: CARDIOLOGY | Facility: CLINIC | Age: 36
Discharge: HOME | End: 2021-06-02
Attending: OBSTETRICS & GYNECOLOGY | Admitting: OBSTETRICS & GYNECOLOGY
Payer: COMMERCIAL

## 2021-06-02 DIAGNOSIS — O26.90 PREGNANCY RELATED CONDITION, ANTEPARTUM: ICD-10-CM

## 2021-06-02 PROCEDURE — 93325 DOPPLER ECHO COLOR FLOW MAPG: CPT

## 2021-06-02 PROCEDURE — 76827 ECHO EXAM OF FETAL HEART: CPT | Mod: 26 | Performed by: PEDIATRICS

## 2021-06-02 PROCEDURE — 76825 ECHO EXAM OF FETAL HEART: CPT | Mod: 26 | Performed by: PEDIATRICS

## 2021-06-02 PROCEDURE — 93325 DOPPLER ECHO COLOR FLOW MAPG: CPT | Mod: 26 | Performed by: PEDIATRICS

## 2021-06-03 ENCOUNTER — OFFICE VISIT (OUTPATIENT)
Dept: CARDIOLOGY | Facility: CLINIC | Age: 36
End: 2021-06-03
Payer: COMMERCIAL

## 2021-06-03 DIAGNOSIS — O26.90 PREGNANCY RELATED CONDITION, ANTEPARTUM: Primary | ICD-10-CM

## 2021-06-03 PROCEDURE — 99202 OFFICE O/P NEW SF 15 MIN: CPT | Mod: 25 | Performed by: PEDIATRICS

## 2021-06-03 NOTE — PROGRESS NOTES
Fetal Cardiology Consultation    Patient:  Mary Alice Jordan MRN:  [id number]   YOB: 1985 Age:  36 year old   Date of Visit:  6/3/2021 PCP:  Jose Escobar MD   SABINO: 10/18/2021, by Ultrasound EGA: 20w3d weeks     Dear Dr. Corcoran:    I had the pleasure of seeing Mary Alice Jordan at the [facility] in [place] on 6/3/2021 in consultation for fetal echocardiography results. As you know, she is a 36 year old female with T2DM.    The fetal echocardiogram was technically challenging. Likely normal fetal echocardiogram. Normal fetal cardiac anatomy. Normal right and left ventricular size and function without hypertrophy. No evidence of diastolic dysfunction. No pericardial effusion. No arrhythmia.     I reviewed and interpreted the fetal echocardiogram today. I discussed the normal results with Ms. Jordan. While these results are normal, it is important to note that fetal echocardiography cannot exclude small atrial or ventricular septal defects, persistent ductus arteriosus, mild coarctation of the aorta, partial anomalous pulmonary venous return, minor anatomic valve anomalies, or coronary artery anomalies.     Thank you for allowing me to participate in Ms. Jordan's care. Please don't hesitate to contact me or the Fetal Cardiology team at [facility] with any questions or concerns.     I spent a total of 20 minutes on the date of the encounter doing chart review, patient history, documentation, counseling, and coordinating care.    Buck Condon MD  Pediatric Cardiology  [facility]  Phone [phone]    Review of the result(s) of each unique test - fetal echocardiogram

## 2021-06-08 ENCOUNTER — MYC MEDICAL ADVICE (OUTPATIENT)
Dept: EDUCATION SERVICES | Facility: CLINIC | Age: 36
End: 2021-06-08

## 2021-06-08 NOTE — TELEPHONE ENCOUNTER
Type 2 Diabetes in Pregnancy Follow-up    Subjective/Objective:    Mary Alice Jordan sent in blood glucose log for review. Last date of communication was: 6/4.    Type 2 diabetes is being managed with medications    Taking diabetes medications:   yes:     Diabetes Medication(s)     Insulin       insulin NPH (NOVOLIN N FLEXPEN) 100 UNIT/ML injection    Inject up to 45 units at bedtime. May substitute with Humulin N (Kwik pen) if insurance prefers.          Estimated Date of Delivery: Oct 18, 2021    BG/Food Log:       Assessment:    Ketones: neg.   Fasting blood glucoses: 100% in target. (since last insulin increase)  After breakfast: 100% in target.  Before lunch: -% in target.  After lunch: 100% in target.  Before dinner: -% in target.  After dinner: 100% in target.    Plan/Response:  No changes in the patient's current treatment plan.  Follow-up in 1 week.    SAKSHI Tang CDE      Any diabetes medication dose changes were made via the CDE Protocol and Collaborative Practice Agreement with the patient's OB/GYN provider. A copy of this encounter was shared with the provider.

## 2021-06-18 ENCOUNTER — OFFICE VISIT (OUTPATIENT)
Dept: MATERNAL FETAL MEDICINE | Facility: CLINIC | Age: 36
End: 2021-06-18
Attending: OBSTETRICS & GYNECOLOGY
Payer: COMMERCIAL

## 2021-06-18 ENCOUNTER — HOSPITAL ENCOUNTER (OUTPATIENT)
Dept: ULTRASOUND IMAGING | Facility: CLINIC | Age: 36
End: 2021-06-18
Attending: OBSTETRICS & GYNECOLOGY
Payer: COMMERCIAL

## 2021-06-18 DIAGNOSIS — O24.112 TYPE 2 DIABETES MELLITUS COMPLICATING PREGNANCY, ANTEPARTUM, SECOND TRIMESTER: Primary | ICD-10-CM

## 2021-06-18 DIAGNOSIS — O99.212 OBESITY AFFECTING PREGNANCY IN SECOND TRIMESTER: ICD-10-CM

## 2021-06-18 PROCEDURE — 76816 OB US FOLLOW-UP PER FETUS: CPT

## 2021-06-18 PROCEDURE — 76816 OB US FOLLOW-UP PER FETUS: CPT | Mod: 26 | Performed by: OBSTETRICS & GYNECOLOGY

## 2021-06-18 NOTE — PROGRESS NOTES
"Please see \"Imaging\" tab under \"Chart Review\" for details of today's US at the [facility].    Franklin Moreau MD  Maternal-Fetal Medicine      "

## 2021-06-21 ENCOUNTER — MYC MEDICAL ADVICE (OUTPATIENT)
Dept: EDUCATION SERVICES | Facility: CLINIC | Age: 36
End: 2021-06-21

## 2021-06-21 NOTE — CONFIDENTIAL NOTE
Gestational Diabetes Follow-up    Subjective/Objective:    Mary Alice Jordan sent in blood glucose log for review. Last date of communication was: 6/8.    Gestational diabetes is being managed with medications    Taking diabetes medications:   yes:     Diabetes Medication(s)     Insulin       insulin NPH (NOVOLIN N FLEXPEN) 100 UNIT/ML injection    Inject up to 45 units at bedtime. May substitute with Humulin N (Kwik pen) if insurance prefers.          Estimated Date of Delivery: Oct 18, 2021    BG/Food Log:       Assessment:  Ketones: N .   Fasting blood glucoses: 86% in target.  After breakfast: 100% in target.  After lunch: 100% in target.  After dinner: 71% in target.    Plan/Response:  No changes in the patient's current treatment plan.  Josiah Wheat!  Wow, numbers look so healthy! Way to go! Seems like that 32 units is just the right thing for now ;)  No need to make changes to the insulin today.     Can you please send again next week Monday at the end of the page? I see you are wrapping up a log book, do you have another?   If not let me know and I can email a sheet ;)  Jem, PAMELA Jacobsen RD, SARAH SMITH reply:      PAMELA Jacobsen RD, SARAH    Any diabetes medication dose changes were made via the CDE Protocol and Collaborative Practice Agreement with the patient's OB/GYN provider.

## 2021-06-24 ENCOUNTER — PRENATAL OFFICE VISIT (OUTPATIENT)
Dept: OBGYN | Facility: CLINIC | Age: 36
End: 2021-06-24
Payer: COMMERCIAL

## 2021-06-24 VITALS
SYSTOLIC BLOOD PRESSURE: 128 MMHG | BODY MASS INDEX: 45.15 KG/M2 | TEMPERATURE: 97.9 F | HEART RATE: 91 BPM | WEIGHT: 271 LBS | HEIGHT: 65 IN | RESPIRATION RATE: 18 BRPM | DIASTOLIC BLOOD PRESSURE: 68 MMHG

## 2021-06-24 DIAGNOSIS — E11.9 TYPE 2 DIABETES MELLITUS WITHOUT COMPLICATION, UNSPECIFIED WHETHER LONG TERM INSULIN USE (H): ICD-10-CM

## 2021-06-24 DIAGNOSIS — Z34.82 PRENATAL CARE, SUBSEQUENT PREGNANCY IN SECOND TRIMESTER: Primary | ICD-10-CM

## 2021-06-24 LAB
ERYTHROCYTE [DISTWIDTH] IN BLOOD BY AUTOMATED COUNT: 14.5 % (ref 10–15)
HCT VFR BLD AUTO: 34.2 % (ref 35–47)
HGB BLD-MCNC: 10.8 G/DL (ref 11.7–15.7)
MCH RBC QN AUTO: 25.2 PG (ref 26.5–33)
MCHC RBC AUTO-ENTMCNC: 31.6 G/DL (ref 31.5–36.5)
MCV RBC AUTO: 80 FL (ref 78–100)
PLATELET # BLD AUTO: 256 10E9/L (ref 150–450)
RBC # BLD AUTO: 4.29 10E12/L (ref 3.8–5.2)
WBC # BLD AUTO: 13.7 10E9/L (ref 4–11)

## 2021-06-24 PROCEDURE — 99207 PR PRENATAL VISIT: CPT | Performed by: OBSTETRICS & GYNECOLOGY

## 2021-06-24 PROCEDURE — 86780 TREPONEMA PALLIDUM: CPT | Mod: 90 | Performed by: OBSTETRICS & GYNECOLOGY

## 2021-06-24 PROCEDURE — 36415 COLL VENOUS BLD VENIPUNCTURE: CPT | Performed by: OBSTETRICS & GYNECOLOGY

## 2021-06-24 PROCEDURE — 99000 SPECIMEN HANDLING OFFICE-LAB: CPT | Performed by: OBSTETRICS & GYNECOLOGY

## 2021-06-24 PROCEDURE — 85027 COMPLETE CBC AUTOMATED: CPT | Performed by: OBSTETRICS & GYNECOLOGY

## 2021-06-24 ASSESSMENT — MIFFLIN-ST. JEOR: SCORE: 1902.19

## 2021-06-24 NOTE — NURSING NOTE
"Initial /68 (BP Location: Right arm, Patient Position: Chair, Cuff Size: Adult Large)   Pulse 91   Temp 97.9  F (36.6  C) (Tympanic)   Resp 18   Ht 1.638 m (5' 4.5\")   Wt 122.9 kg (271 lb)   LMP  (LMP Unknown)   BMI 45.80 kg/m   Estimated body mass index is 45.8 kg/m  as calculated from the following:    Height as of this encounter: 1.638 m (5' 4.5\").    Weight as of this encounter: 122.9 kg (271 lb). .      "

## 2021-06-24 NOTE — PROGRESS NOTES
"CC: Here for routine prenatal visit @ 23w3d   HPI:  BS in range with insulin adjustment; due for next growth scan in 4 weeks    PE: /68 (BP Location: Right arm, Patient Position: Chair, Cuff Size: Adult Large)   Pulse 91   Temp 97.9  F (36.6  C) (Tympanic)   Resp 18   Ht 1.638 m (5' 4.5\")   Wt 122.9 kg (271 lb)   LMP  (LMP Unknown)   BMI 45.80 kg/m     See OB flowsheet      A:  1. Prenatal care, subsequent pregnancy in second trimester    - CBC with platelets  - Treponema Abs w Reflex to RPR and Titer  - US OB >14 Weeks Follow Up; Future    2. Type 2 diabetes mellitus without complication, unspecified whether long term insulin use (H)    - US OB >14 Weeks Follow Up; Future    Growth scan 4 weeks  Awareness of fetal movement  Routine prenatal care  RTC 4 weeks.      Kaity Street M.D.     "

## 2021-06-25 LAB — T PALLIDUM AB SER QL: NONREACTIVE

## 2021-07-02 ENCOUNTER — MYC MEDICAL ADVICE (OUTPATIENT)
Dept: OBGYN | Facility: CLINIC | Age: 36
End: 2021-07-02

## 2021-07-02 NOTE — TELEPHONE ENCOUNTER
"Please review and advise. Patient saw results through Mychart and is concerned that results are not within \"normal\" range.     Component      Latest Ref Rng & Units 6/24/2021   WBC      4.0 - 11.0 10e9/L 13.7 (H)   RBC Count      3.8 - 5.2 10e12/L 4.29   Hemoglobin      11.7 - 15.7 g/dL 10.8 (L)   Hematocrit      35.0 - 47.0 % 34.2 (L)   MCV      78 - 100 fl 80   MCH      26.5 - 33.0 pg 25.2 (L)   MCHC      31.5 - 36.5 g/dL 31.6   RDW      10.0 - 15.0 % 14.5   Platelet Count      150 - 450 10e9/L 256   Treponema Antibodies      NR:Nonreactive Nonreactive     Thanks,    Yael Martinez, RN   "

## 2021-07-23 ENCOUNTER — PRENATAL OFFICE VISIT (OUTPATIENT)
Dept: OBGYN | Facility: CLINIC | Age: 36
End: 2021-07-23
Payer: COMMERCIAL

## 2021-07-23 ENCOUNTER — HOSPITAL ENCOUNTER (OUTPATIENT)
Dept: ULTRASOUND IMAGING | Facility: CLINIC | Age: 36
Discharge: HOME | End: 2021-07-23
Attending: OBSTETRICS & GYNECOLOGY | Admitting: OBSTETRICS & GYNECOLOGY
Payer: COMMERCIAL

## 2021-07-23 ENCOUNTER — MYC MEDICAL ADVICE (OUTPATIENT)
Dept: EDUCATION SERVICES | Facility: CLINIC | Age: 36
End: 2021-07-23

## 2021-07-23 VITALS
BODY MASS INDEX: 46.42 KG/M2 | SYSTOLIC BLOOD PRESSURE: 121 MMHG | HEIGHT: 64 IN | WEIGHT: 271.9 LBS | RESPIRATION RATE: 14 BRPM | TEMPERATURE: 98.5 F | DIASTOLIC BLOOD PRESSURE: 67 MMHG | HEART RATE: 88 BPM

## 2021-07-23 DIAGNOSIS — E11.9 TYPE 2 DIABETES MELLITUS WITHOUT COMPLICATION, UNSPECIFIED WHETHER LONG TERM INSULIN USE (H): ICD-10-CM

## 2021-07-23 DIAGNOSIS — Z34.82 PRENATAL CARE, SUBSEQUENT PREGNANCY IN SECOND TRIMESTER: ICD-10-CM

## 2021-07-23 DIAGNOSIS — E66.01 MORBIDLY OBESE (H): Primary | ICD-10-CM

## 2021-07-23 DIAGNOSIS — O09.521 MULTIGRAVIDA OF ADVANCED MATERNAL AGE IN FIRST TRIMESTER: ICD-10-CM

## 2021-07-23 LAB
ANION GAP SERPL CALCULATED.3IONS-SCNC: 7 MMOL/L (ref 3–14)
BUN SERPL-MCNC: 8 MG/DL (ref 7–30)
CALCIUM SERPL-MCNC: 9.5 MG/DL (ref 8.5–10.1)
CHLORIDE BLD-SCNC: 105 MMOL/L (ref 94–109)
CO2 SERPL-SCNC: 25 MMOL/L (ref 20–32)
CREAT SERPL-MCNC: 0.46 MG/DL (ref 0.52–1.04)
CREAT UR-MCNC: 32 MG/DL
GFR SERPL CREATININE-BSD FRML MDRD: >90 ML/MIN/1.73M2
GLUCOSE BLD-MCNC: 76 MG/DL (ref 70–99)
POTASSIUM BLD-SCNC: 3.5 MMOL/L (ref 3.4–5.3)
PROT UR-MCNC: <0.05 G/L
PROT/CREAT 24H UR: NORMAL MG/G{CREAT}
SODIUM SERPL-SCNC: 137 MMOL/L (ref 133–144)

## 2021-07-23 PROCEDURE — 84156 ASSAY OF PROTEIN URINE: CPT | Performed by: OBSTETRICS & GYNECOLOGY

## 2021-07-23 PROCEDURE — 99207 PR PRENATAL VISIT: CPT | Performed by: OBSTETRICS & GYNECOLOGY

## 2021-07-23 PROCEDURE — 76816 OB US FOLLOW-UP PER FETUS: CPT

## 2021-07-23 PROCEDURE — 80048 BASIC METABOLIC PNL TOTAL CA: CPT | Performed by: OBSTETRICS & GYNECOLOGY

## 2021-07-23 PROCEDURE — 36415 COLL VENOUS BLD VENIPUNCTURE: CPT | Performed by: OBSTETRICS & GYNECOLOGY

## 2021-07-23 ASSESSMENT — MIFFLIN-ST. JEOR: SCORE: 1898.33

## 2021-07-23 NOTE — NURSING NOTE
"Initial /67 (BP Location: Right arm, Patient Position: Sitting, Cuff Size: Adult Large)   Pulse 88   Temp 98.5  F (36.9  C) (Tympanic)   Resp 14   Ht 1.626 m (5' 4\")   Wt 123.3 kg (271 lb 14.4 oz)   LMP  (LMP Unknown)   BMI 46.67 kg/m   Estimated body mass index is 46.67 kg/m  as calculated from the following:    Height as of this encounter: 1.626 m (5' 4\").    Weight as of this encounter: 123.3 kg (271 lb 14.4 oz). .      "

## 2021-07-23 NOTE — PROGRESS NOTES
"[facility]    Return OB Note    CC: Return OB     Subjective:  Mary Alice is a 36 year old  at 27w4d   Denies vaginal bleeding, loss of fluid, or regular contractions. Good fetal movement.  Complaints today: None    Objective:  /67 (BP Location: Right arm, Patient Position: Sitting, Cuff Size: Adult Large)   Pulse 88   Temp 98.5  F (36.9  C) (Tympanic)   Resp 14   Ht 1.626 m (5' 4\")   Wt 123.3 kg (271 lb 14.4 oz)   LMP  (LMP Unknown)   BMI 46.67 kg/m      Fundal height: 35cm  FHT: 150bpm    Assessment/Plan:   Encounter Diagnoses   Name Primary?     Morbidly obese (H) Yes     Type 2 diabetes mellitus without complication, unspecified whether long term insulin use (H)      Prenatal care, subsequent pregnancy in second trimester      Multigravida of advanced maternal age in first trimester        IUP at 27w4d  -Type 2 DM: on insulin   -Following with diabetic team for insulin titration. Currently on PM NPH but daytime sugars have been elevated. Discussed increased insulin requirements in pregnancy and likely need for daytime insulin. Has appt on Monday for review.   -S/p L2 and fetal echo (grossly WNL, somewhat limited study)   -Needs maternal echo and EKG, ordered today   -On baby ASA BID   -Some baseline HELLP labs completed and normal, remainder of labs ordered today   -Follow with serial growth US and twice weekly BPPs at 32 weeks (all scheduled)   -Plan for delivery 37-39 weeks, pending glucose control    -Morbid obesity: serial growth US and BPPs  -AMA: NIPT and L2 WNL  -Mid trimester CBC with mild anemia  -Strict return precautions given    RTC 2 weeks    Kelly Girard DO    "

## 2021-07-23 NOTE — CONFIDENTIAL NOTE
"Mary Alice sent [employer] message requesting a call:  If you have time, can you or someone from your office call me to go over my numbers?      Thanks,  Mary Alice [phone]    T2D in pregnancy Diabetes Follow-up- she reports new ds T2D in pregnancy so never had any teaching before    Subjective/Objective:    Mary Alice Jordan sent in blood glucose log for review. Last date of communication was: 7/19.    ~ Called Mary Alice per her request, alerted her that calls >5 minutes are billed,she agrees.    Gestational diabetes is being managed with medications    Taking diabetes medications:   yes:     Diabetes Medication(s)     Insulin       insulin NPH (NOVOLIN N FLEXPEN) 100 UNIT/ML injection    Inject up to 45 units at bedtime. May substitute with Humulin N (Kwik pen) if insurance prefers.          Estimated Date of Delivery: Oct 18, 2021    BG/Food Log:   Mary Alice says she increased insulin as directed to NPH 44 units at HS.  Fasting numbers \"have been weird too\"- Tues was 115, Wed 84, Thurs  110, Fri 95  ~ last night had no snack, \"I don't know what to eat\" was so hungry    She reports:     Last night half cup carrots and 2 turkey sausages  This AM ate 1 egg and a bagel thin (she eats half bagel thin so = 10 grams carb)- normally has 3 eggs  Lunch today was pre-prepped meal with cauliflower noodles and meat chunks and string cheese (recognizes no carb, she is afraid to eat them)  \"If I eat what I normally eat numbers go way up\"      Assessment:    Ketones: not assessed.   Fasting blood glucoses: 50% in target.  Reports sudden increase in post-meal readings    We reviewed in some detail:  ~ body was already making extra insulin in T2D, now in pregnancy has to make even more and might not be able to keep up  ~ not doing anything wrong, it's the pregnancy hormones \"getting in the way\"  ~ need to eat carb to nourish self and baby- if numbers are high which is somewhat expected given the T2D dx, we'll use insulin to help keep them in " the healthy zone    Plan/Response:  1) Recommend increase to insulin - Increase NPH to 46 units at HS tonight.  2) aim for 30/45/45 g carb at meals and 15g + pro at HS  3) Follow-up on Monday by edmund  4) Starting today, check before lunch and dinner also- goal <96. If high we will request morning NPH to help, if post meals high we can add mealtime insulin- she voices agreement with the plan    Stefany Pierce RD, PAMELA, [facility]      Any diabetes medication dose changes were made via the CDE Protocol and Collaborative Practice Agreement with the patient's OB/GYN provider.

## 2021-08-05 ENCOUNTER — PRENATAL OFFICE VISIT (OUTPATIENT)
Dept: OBGYN | Facility: CLINIC | Age: 36
End: 2021-08-05

## 2021-08-05 VITALS
HEART RATE: 100 BPM | HEIGHT: 64 IN | SYSTOLIC BLOOD PRESSURE: 133 MMHG | DIASTOLIC BLOOD PRESSURE: 70 MMHG | BODY MASS INDEX: 46.1 KG/M2 | WEIGHT: 270 LBS

## 2021-08-05 DIAGNOSIS — O36.8190: Primary | ICD-10-CM

## 2021-08-05 PROCEDURE — 99207 PR PRENATAL VISIT: CPT | Performed by: OBSTETRICS & GYNECOLOGY

## 2021-08-05 PROCEDURE — 90471 IMMUNIZATION ADMIN: CPT | Performed by: OBSTETRICS & GYNECOLOGY

## 2021-08-05 PROCEDURE — 90715 TDAP VACCINE 7 YRS/> IM: CPT | Performed by: OBSTETRICS & GYNECOLOGY

## 2021-08-05 ASSESSMENT — MIFFLIN-ST. JEOR: SCORE: 1889.71

## 2021-08-05 NOTE — PROGRESS NOTES
"[facility]     Return OB Note     CC: Return OB      Subjective:  Mary Alice is a 36 year old  at 29w3d   Denies vaginal bleeding, loss of fluid, or regular contractions. Good fetal movement.  Complaints today: decreased fetal movements, only noted 10 movements in the past 24hrs, when she usually has normal kick counts.      Objective:  /70 (BP Location: Left arm, Cuff Size: Adult Regular)   Pulse 100   Ht 1.626 m (5' 4\")   Wt 122.5 kg (270 lb)   LMP  (LMP Unknown)   BMI 46.35 kg/m      See OB flowsheet     Assessment/Plan:        Encounter Diagnoses   Name Primary?     Morbidly obese (H) Yes     Type 2 diabetes mellitus without complication, unspecified whether long term insulin use (H)       Prenatal care, subsequent pregnancy in second trimester       Multigravida of advanced maternal age in first trimester           IUP at 29w3d    -Type 2 DM: on insulin              -Following with diabetic team for insulin titration. 44 at bedtime, no meal times coverage. Fastings are usually 88-93. This morning it was 102. Post-prandial 130s - rarely 140. Even more 153 improves with a walk. Checks one hour after eating.               -S/p L2 and fetal echo (grossly WNL, somewhat limited study)              -Needs maternal echo and EKG, ordered - needs helping scheduling               -On baby ASA BID              -baseline HELLP labs normal               -Follow with serial growth US and twice weekly BPPs at 32 weeks (all scheduled)              -Plan for delivery 37-39 weeks, pending glucose control     -Morbid obesity: serial growth US and BPPs  -AMA: NIPT and L2 WNL  -Mid trimester CBC with mild anemia  -Strict return precautions given    NST performed secondary to decreased fetal movement     Baseline 140, mod balbir, +accels, no decels  Churchill: negative    Cat 1 reactive NST      I did discuss the option of performing weekly BPPs now, if she is having difficulty with assessing fetal " movement, but she reports that she can feel him moving normally now.      RTC 2 weeks     Hali Mondragon MD  OB/GYN

## 2021-08-05 NOTE — NURSING NOTE
Prior to immunization administration, verified patients identity using patient s name and date of birth. Please see Immunization Activity for additional information.     Screening Questionnaire for Adult Immunization    Are you sick today?   No   Do you have allergies to medications, food, a vaccine component or latex?   No   Have you ever had a serious reaction after receiving a vaccination?   No   Do you have a long-term health problem with heart, lung, kidney, or metabolic disease (e.g., diabetes), asthma, a blood disorder, no spleen, complement component deficiency, a cochlear implant, or a spinal fluid leak?  Are you on long-term aspirin therapy?  Yes   Do you have cancer, leukemia, HIV/AIDS, or any other immune system problem?   No   Do you have a parent, brother, or sister with an immune system problem?   No   In the past 3 months, have you taken medications that affect  your immune system, such as prednisone, other steroids, or anticancer drugs; drugs for the treatment of rheumatoid arthritis, Crohn s disease, or psoriasis; or have you had radiation treatments?   No   Have you had a seizure, or a brain or other nervous system problem?   No   During the past year, have you received a transfusion of blood or blood    products, or been given immune (gamma) globulin or antiviral drug?   No   For women: Are you pregnant or is there a chance you could become       pregnant during the next month?   Yes   Have you received any vaccinations in the past 4 weeks?   No            Per orders of Dr. Mondragon, injection of TDAP given by Oneil Mosqueda MA. Patient instructed to remain in clinic for 15 minutes afterwards, and to report any adverse reaction to me immediately.       Screening performed by Oneil Mosqueda MA on 8/5/2021 at 3:59 PM.

## 2021-08-10 ENCOUNTER — MYC MEDICAL ADVICE (OUTPATIENT)
Dept: EDUCATION SERVICES | Facility: CLINIC | Age: 36
End: 2021-08-10

## 2021-08-10 DIAGNOSIS — O24.119 PREGNANCY COMPLICATED BY PRE-EXISTING TYPE 2 DIABETES: ICD-10-CM

## 2021-08-10 RX ORDER — HUMAN INSULIN 100 [IU]/ML
INJECTION, SUSPENSION SUBCUTANEOUS
Qty: 30 ML | Refills: 2 | Status: SHIPPED | OUTPATIENT
Start: 2021-08-10 | End: 2021-08-18

## 2021-08-10 NOTE — TELEPHONE ENCOUNTER
Gestational Diabetes Follow-up    Subjective/Objective:    Mary Alice ANNALISA Jordan sent in blood glucose log for review. Last date of communication was: 7/23.    Gestational diabetes is being managed with diet, activity and medications    Taking diabetes medications:   yes:     Diabetes Medication(s)     Insulin       insulin NPH (NOVOLIN N FLEXPEN) 100 UNIT/ML injection    Inject up to 45 units at bedtime. May substitute with Humulin N (Kwik pen) if insurance prefers.          Estimated Date of Delivery: Oct 18, 2021    BG/Food Log:         Assessment:    Ketones: not recorded.   Fasting blood glucoses:57 % in target.  After breakfast: 100% in target.  Before lunch: -% in target.  After lunch: 50% in target.  Before dinner: -% in target.  After dinner: 63% in target.    Was asked to check before lunch and dinner meals too for her BG but I do not see those recorded.      Plan/Response:  Recommend increase to insulin - increase NPH 0-0-0-44 --> 0-0-0-47.  Recommend to check BG before lunch as well to see if some morning NPH would be beneficial.  Follow-up in 3-4 days.    Marielle Love RD LD CDE      Any diabetes medication dose changes were made via the CDE Protocol and Collaborative Practice Agreement with the patient's OB/GYN provider. A copy of this encounter was shared with the provider.

## 2021-08-18 ENCOUNTER — MYC MEDICAL ADVICE (OUTPATIENT)
Dept: EDUCATION SERVICES | Facility: CLINIC | Age: 36
End: 2021-08-18

## 2021-08-18 DIAGNOSIS — O24.119 PREGNANCY COMPLICATED BY PRE-EXISTING TYPE 2 DIABETES: ICD-10-CM

## 2021-08-18 RX ORDER — HUMAN INSULIN 100 [IU]/ML
INJECTION, SUSPENSION SUBCUTANEOUS
Qty: 30 ML | Refills: 2 | Status: SHIPPED | OUTPATIENT
Start: 2021-08-18 | End: 2021-08-19

## 2021-08-18 NOTE — TELEPHONE ENCOUNTER
Gestational Diabetes Follow-up    Subjective/Objective:    Mary Alice Jordan sent in blood glucose log for review. Last date of communication was: 8/10/2021.    Gestational diabetes is being managed with diet, activity and medications    Taking diabetes medications:   yes:     Diabetes Medication(s)     Insulin       insulin NPH (NOVOLIN N FLEXPEN) 100 UNIT/ML injection    Inject up to 47 units at bedtime. May substitute with Humulin N (Kwik pen) if insurance prefers.          Estimated Date of Delivery: Oct 18, 2021    BG/Food Log:        Assessment:    Ketones: na.   Fasting blood glucoses: 28% in target.  After breakfast: 50% in target.  Before lunch: 50% in target.  After lunch: 80% in target.  After dinner: 60% in target.    Hoping that after breakfast numbers will lower as fasting numbers improve. May need meal time insulin-will continue to watch closely    Plan/Response:  Recommend increase to insulin - NPH 0-0-0-47 --->0-0-0-54 (15% increase)  Consider meal time insulin if still elevated by Monday  Follow-up on Monday.    SAKSHI Gallagher CDCES    Any diabetes medication dose changes were made via the CDE Protocol and Collaborative Practice Agreement with the patient's OB/GYN provider. A copy of this encounter was shared with the provider.

## 2021-08-19 ENCOUNTER — PRENATAL OFFICE VISIT (OUTPATIENT)
Dept: OBGYN | Facility: CLINIC | Age: 36
End: 2021-08-19
Payer: COMMERCIAL

## 2021-08-19 ENCOUNTER — HOSPITAL ENCOUNTER (OUTPATIENT)
Dept: CARDIOLOGY | Facility: CLINIC | Age: 36
Discharge: HOME | End: 2021-08-19
Attending: OBSTETRICS & GYNECOLOGY | Admitting: OBSTETRICS & GYNECOLOGY
Payer: COMMERCIAL

## 2021-08-19 VITALS
DIASTOLIC BLOOD PRESSURE: 70 MMHG | HEIGHT: 65 IN | SYSTOLIC BLOOD PRESSURE: 116 MMHG | WEIGHT: 271 LBS | TEMPERATURE: 98.9 F | HEART RATE: 98 BPM | BODY MASS INDEX: 45.15 KG/M2 | RESPIRATION RATE: 18 BRPM

## 2021-08-19 DIAGNOSIS — E11.9 TYPE 2 DIABETES MELLITUS WITHOUT COMPLICATION, UNSPECIFIED WHETHER LONG TERM INSULIN USE (H): ICD-10-CM

## 2021-08-19 DIAGNOSIS — Z34.82 PRENATAL CARE, SUBSEQUENT PREGNANCY IN SECOND TRIMESTER: ICD-10-CM

## 2021-08-19 DIAGNOSIS — O24.113 PREGNANCY COMPLICATED BY PRE-EXISTING TYPE 2 DIABETES IN THIRD TRIMESTER: Primary | ICD-10-CM

## 2021-08-19 LAB — LVEF ECHO: NORMAL

## 2021-08-19 PROCEDURE — 93306 TTE W/DOPPLER COMPLETE: CPT | Mod: 26 | Performed by: INTERNAL MEDICINE

## 2021-08-19 PROCEDURE — 93306 TTE W/DOPPLER COMPLETE: CPT

## 2021-08-19 PROCEDURE — 99207 PR PRENATAL VISIT: CPT | Performed by: OBSTETRICS & GYNECOLOGY

## 2021-08-19 RX ORDER — HUMAN INSULIN 100 [IU]/ML
INJECTION, SUSPENSION SUBCUTANEOUS
Qty: 30 ML | Refills: 2 | Status: SHIPPED | OUTPATIENT
Start: 2021-08-19 | End: 2021-08-24

## 2021-08-19 ASSESSMENT — MIFFLIN-ST. JEOR: SCORE: 1902.19

## 2021-08-19 NOTE — PROGRESS NOTES
"CC: Here for routine prenatal visit @ 31w3d   HPI:  Insulin continues to be increased with some 's; starts regular BPP and Q4 wks growth scans next week; discussed possible impending macrosomia, possible need for delivery around 37 weeks, and potential c/section    PE: /70 (BP Location: Right arm, Patient Position: Chair, Cuff Size: Adult Large)   Pulse 98   Temp 98.9  F (37.2  C) (Tympanic)   Resp 18   Ht 1.638 m (5' 4.5\")   Wt 122.9 kg (271 lb)   LMP  (LMP Unknown)   Breastfeeding No   BMI 45.80 kg/m     See OB flowsheet      A:  1. Pregnancy complicated by pre-existing type 2 diabetes      2. Type 2 diabetes mellitus without complication, unspecified whether long term insulin use (H)    - insulin NPH (NOVOLIN N FLEXPEN) 100 UNIT/ML injection; Inject up to 54 units at bedtime. May substitute with Humulin N (Kwik pen) if insurance prefers.  Dispense: 30 mL; Refill: 2      Routine prenatal care  RTC 2 weeks.      Kaity Street M.D.     "

## 2021-08-19 NOTE — NURSING NOTE
"Initial /70 (BP Location: Right arm, Patient Position: Chair, Cuff Size: Adult Large)   Pulse 98   Temp 98.9  F (37.2  C) (Tympanic)   Resp 18   Ht 1.638 m (5' 4.5\")   Wt 122.9 kg (271 lb)   LMP  (LMP Unknown)   Breastfeeding No   BMI 45.80 kg/m   Estimated body mass index is 45.8 kg/m  as calculated from the following:    Height as of this encounter: 1.638 m (5' 4.5\").    Weight as of this encounter: 122.9 kg (271 lb). .        "

## 2021-08-23 ENCOUNTER — MYC MEDICAL ADVICE (OUTPATIENT)
Dept: OBGYN | Facility: CLINIC | Age: 36
End: 2021-08-23

## 2021-08-23 ENCOUNTER — HOSPITAL ENCOUNTER (OUTPATIENT)
Dept: ULTRASOUND IMAGING | Facility: CLINIC | Age: 36
Discharge: HOME | End: 2021-08-23
Attending: OBSTETRICS & GYNECOLOGY | Admitting: OBSTETRICS & GYNECOLOGY

## 2021-08-23 DIAGNOSIS — E11.9 TYPE 2 DIABETES MELLITUS WITHOUT COMPLICATIONS (H): ICD-10-CM

## 2021-08-23 DIAGNOSIS — Z34.83 PRENATAL CARE, SUBSEQUENT PREGNANCY IN THIRD TRIMESTER: ICD-10-CM

## 2021-08-23 PROCEDURE — 76816 OB US FOLLOW-UP PER FETUS: CPT

## 2021-08-23 PROCEDURE — 76819 FETAL BIOPHYS PROFIL W/O NST: CPT

## 2021-08-23 NOTE — LETTER
[facility]   [address] [place] [address]  [phone]    2021      RE:Mary Alice FANG Julian        [address]   [place] [address]            To Whom It May Concern:      Mary Alice Jordan ( 1985) is a patient here at [facility].  Starting , she will only be able to work remotely from home until she gives birth.  Due to her high risk pregnancy, she will be self quarantined until she can deliver at 37.5 weeks.        Sincerely,      Kaity Street MD

## 2021-08-24 ENCOUNTER — MYC MEDICAL ADVICE (OUTPATIENT)
Dept: EDUCATION SERVICES | Facility: CLINIC | Age: 36
End: 2021-08-24

## 2021-08-24 DIAGNOSIS — E11.9 TYPE 2 DIABETES MELLITUS WITHOUT COMPLICATION, UNSPECIFIED WHETHER LONG TERM INSULIN USE (H): ICD-10-CM

## 2021-08-24 RX ORDER — HUMAN INSULIN 100 [IU]/ML
INJECTION, SUSPENSION SUBCUTANEOUS
Qty: 30 ML | Refills: 2 | Status: SHIPPED | OUTPATIENT
Start: 2021-08-24 | End: 2021-09-10

## 2021-08-24 NOTE — TELEPHONE ENCOUNTER
Gestational Diabetes Follow-up    Subjective/Objective:    Mary Alice ANNALISA Jordan sent in blood glucose log for review. Last date of communication was: 8/18/2021.    Gestational diabetes is being managed with diet, activity and medications    Taking diabetes medications:   yes:     Diabetes Medication(s)     Insulin       insulin NPH (NOVOLIN N FLEXPEN) 100 UNIT/ML injection    Inject up to 54 units at bedtime. May substitute with Humulin N (Kwik pen) if insurance prefers.          Estimated Date of Delivery: Oct 18, 2021    BG/Food Log:       Assessment:    Ketones: na.   Fasting blood glucoses: 0% in target.  After breakfast: 75% in target.  Before lunch: 25% in target.  After lunch: 50% in target.  After dinner: 75% in target.    May need to watch post meal numbers closely. They are variable, however popping up elevated a bit more often than 1 or 2 weeks ago.    Plan/Response:  Recommend increase to insulin - NPH 0-0-0-54 ---> 0-0-0-62  Follow-up in 3-4 days.    SAKSHI Gallagher CDCES    Any diabetes medication dose changes were made via the CDE Protocol and Collaborative Practice Agreement with the patient's OB/GYN provider. A copy of this encounter was shared with the provider.

## 2021-08-25 ENCOUNTER — HOSPITAL ENCOUNTER (OUTPATIENT)
Dept: ULTRASOUND IMAGING | Facility: CLINIC | Age: 36
Discharge: HOME | End: 2021-08-25
Attending: OBSTETRICS & GYNECOLOGY | Admitting: OBSTETRICS & GYNECOLOGY

## 2021-08-25 DIAGNOSIS — Z34.83 PRENATAL CARE, SUBSEQUENT PREGNANCY IN THIRD TRIMESTER: ICD-10-CM

## 2021-08-25 DIAGNOSIS — E11.9 TYPE 2 DIABETES MELLITUS WITHOUT COMPLICATIONS (H): ICD-10-CM

## 2021-08-25 PROCEDURE — 76819 FETAL BIOPHYS PROFIL W/O NST: CPT

## 2021-08-30 ENCOUNTER — HOSPITAL ENCOUNTER (OUTPATIENT)
Dept: ULTRASOUND IMAGING | Facility: CLINIC | Age: 36
Discharge: HOME | End: 2021-08-30
Attending: OBSTETRICS & GYNECOLOGY | Admitting: OBSTETRICS & GYNECOLOGY

## 2021-08-30 ENCOUNTER — ALLIED HEALTH/NURSE VISIT (OUTPATIENT)
Dept: OBGYN | Facility: CLINIC | Age: 36
End: 2021-08-30
Payer: COMMERCIAL

## 2021-08-30 DIAGNOSIS — O24.113 PREGNANCY COMPLICATED BY PRE-EXISTING TYPE 2 DIABETES IN THIRD TRIMESTER: Primary | ICD-10-CM

## 2021-08-30 DIAGNOSIS — Z34.83 PRENATAL CARE, SUBSEQUENT PREGNANCY IN THIRD TRIMESTER: ICD-10-CM

## 2021-08-30 DIAGNOSIS — E11.9 TYPE 2 DIABETES MELLITUS WITHOUT COMPLICATIONS (H): ICD-10-CM

## 2021-08-30 PROCEDURE — 76819 FETAL BIOPHYS PROFIL W/O NST: CPT

## 2021-08-30 PROCEDURE — 99207 PR NO CHARGE NURSE ONLY: CPT

## 2021-09-01 ENCOUNTER — HOSPITAL ENCOUNTER (OUTPATIENT)
Dept: ULTRASOUND IMAGING | Facility: CLINIC | Age: 36
Discharge: HOME | End: 2021-09-01
Attending: OBSTETRICS & GYNECOLOGY | Admitting: OBSTETRICS & GYNECOLOGY

## 2021-09-01 DIAGNOSIS — Z34.83 PRENATAL CARE, SUBSEQUENT PREGNANCY IN THIRD TRIMESTER: ICD-10-CM

## 2021-09-01 DIAGNOSIS — E11.9 TYPE 2 DIABETES MELLITUS WITHOUT COMPLICATIONS (H): ICD-10-CM

## 2021-09-01 PROCEDURE — 76819 FETAL BIOPHYS PROFIL W/O NST: CPT

## 2021-09-02 ENCOUNTER — PRENATAL OFFICE VISIT (OUTPATIENT)
Dept: OBGYN | Facility: CLINIC | Age: 36
End: 2021-09-02
Payer: COMMERCIAL

## 2021-09-02 VITALS
SYSTOLIC BLOOD PRESSURE: 133 MMHG | TEMPERATURE: 98.2 F | WEIGHT: 276 LBS | HEART RATE: 98 BPM | BODY MASS INDEX: 46.64 KG/M2 | DIASTOLIC BLOOD PRESSURE: 80 MMHG

## 2021-09-02 DIAGNOSIS — Z34.93 PRENATAL CARE, THIRD TRIMESTER: Primary | ICD-10-CM

## 2021-09-02 PROCEDURE — 99207 PR PRENATAL VISIT: CPT | Performed by: OBSTETRICS & GYNECOLOGY

## 2021-09-02 NOTE — PROGRESS NOTES
[facility]     Return OB Note     CC: Return OB      Subjective:  Mary Ailce is a 36 year old  at 33w3d   Denies vaginal bleeding, loss of fluid, or regular contractions. Good fetal movement.    Objective:  /80 (BP Location: Left arm, Patient Position: Chair, Cuff Size: Adult Large)   Pulse 98   Temp 98.2  F (36.8  C) (Tympanic)   Wt 125.2 kg (276 lb)   LMP  (LMP Unknown)   Breastfeeding No   BMI 46.64 kg/m     FH not accurate due to body habitus  Doptones 150s     Assessment/Plan:  IUP at 33w3d      -Type 2 DM: on insulin              -Following with diabetic team for insulin titration. Reports recent glucoses generally within goal with occasional elevated.               -S/p L2 and fetal echo (grossly WNL, somewhat limited study)              -On baby ASA BID   -s/p echo/EKG              -baseline HELLP labs normal               -Follow with serial growth US (last  93%) and twice weekly BPPs at 32 weeks (all scheduled)              -Plan for delivery 37-39 weeks, pending glucose control     -Morbid obesity: serial growth US and BPPs  -AMA: NIPT and L2 WNL  -Mid trimester CBC with mild anemia  -Strict return precautions given    Nicole Spear MD   2021 3:52 PM

## 2021-09-02 NOTE — NURSING NOTE
"Initial /80 (BP Location: Left arm, Patient Position: Chair, Cuff Size: Adult Large)   Pulse 98   Temp 98.2  F (36.8  C) (Tympanic)   Wt 125.2 kg (276 lb)   LMP  (LMP Unknown)   Breastfeeding No   BMI 46.64 kg/m   Estimated body mass index is 46.64 kg/m  as calculated from the following:    Height as of 8/19/21: 1.638 m (5' 4.5\").    Weight as of this encounter: 125.2 kg (276 lb). .    Rossy Moody MA    "

## 2021-09-07 ENCOUNTER — HOSPITAL ENCOUNTER (OUTPATIENT)
Dept: ULTRASOUND IMAGING | Facility: CLINIC | Age: 36
Discharge: HOME | End: 2021-09-07
Attending: OBSTETRICS & GYNECOLOGY | Admitting: OBSTETRICS & GYNECOLOGY
Payer: COMMERCIAL

## 2021-09-07 DIAGNOSIS — Z34.83 PRENATAL CARE, SUBSEQUENT PREGNANCY IN THIRD TRIMESTER: ICD-10-CM

## 2021-09-07 DIAGNOSIS — E11.9 TYPE 2 DIABETES MELLITUS WITHOUT COMPLICATIONS (H): ICD-10-CM

## 2021-09-07 PROCEDURE — 76819 FETAL BIOPHYS PROFIL W/O NST: CPT

## 2021-09-09 ENCOUNTER — MYC MEDICAL ADVICE (OUTPATIENT)
Dept: OBGYN | Facility: CLINIC | Age: 36
End: 2021-09-09

## 2021-09-09 ENCOUNTER — HOSPITAL ENCOUNTER (OUTPATIENT)
Dept: ULTRASOUND IMAGING | Facility: CLINIC | Age: 36
Discharge: HOME | End: 2021-09-09
Attending: OBSTETRICS & GYNECOLOGY | Admitting: OBSTETRICS & GYNECOLOGY

## 2021-09-09 DIAGNOSIS — E11.9 TYPE 2 DIABETES MELLITUS WITHOUT COMPLICATIONS (H): ICD-10-CM

## 2021-09-09 DIAGNOSIS — Z34.83 PRENATAL CARE, SUBSEQUENT PREGNANCY IN THIRD TRIMESTER: ICD-10-CM

## 2021-09-09 PROCEDURE — 76819 FETAL BIOPHYS PROFIL W/O NST: CPT

## 2021-09-10 ENCOUNTER — MYC MEDICAL ADVICE (OUTPATIENT)
Dept: EDUCATION SERVICES | Facility: CLINIC | Age: 36
End: 2021-09-10

## 2021-09-10 DIAGNOSIS — E11.9 TYPE 2 DIABETES MELLITUS WITHOUT COMPLICATION, UNSPECIFIED WHETHER LONG TERM INSULIN USE (H): ICD-10-CM

## 2021-09-10 RX ORDER — HUMAN INSULIN 100 [IU]/ML
INJECTION, SUSPENSION SUBCUTANEOUS
Qty: 30 ML | Refills: 2 | Status: SHIPPED | OUTPATIENT
Start: 2021-09-10 | End: 2021-09-15

## 2021-09-10 NOTE — TELEPHONE ENCOUNTER
Gestational Diabetes Follow-up    Subjective/Objective:    Mary Alice Jordan sent in blood glucose log for review. Last date of communication was: 9/7/21.    Gestational diabetes is being managed with diet, activity and medications    Taking diabetes medications:   yes:     Diabetes Medication(s)     Insulin       insulin NPH (NOVOLIN N FLEXPEN) 100 UNIT/ML injection    62 units at bedtime + 2 unit prime = 64 units TDD. May substitute with Humulin N (Kwik pen) if insurance prefers.          Estimated Date of Delivery: Oct 18, 2021, 34w4d    BG/Food Log:       Assessment:  Before dinner is elevated, recommend adding in 5 units of NPH before lunch to better cover lunch to dinner.   Advise against fruit at breakfast    Ketones: none reported.   Fasting blood glucoses: 33% in target.  After breakfast: 67% in target.  Before lunch: 50% in target.  After lunch: 100% in target.  Before dinner: 0% in target.  After dinner: 50% in target.    Plan/Response:  Recommend increase to insulin - NPH 0-0-0-62 --> 0-5-62-0.    Recommend fruit as between meal snacks vs. At breakfast.    Bernice Mcdaniel MS, RD, LD, CDE      Any diabetes medication dose changes were made via the CDE Protocol and Collaborative Practice Agreement with the patient's OB/GYN provider. A copy of this encounter was shared with the provider.      
Yes

## 2021-09-13 ENCOUNTER — HOSPITAL ENCOUNTER (OUTPATIENT)
Dept: ULTRASOUND IMAGING | Facility: CLINIC | Age: 36
Discharge: HOME | End: 2021-09-13
Attending: OBSTETRICS & GYNECOLOGY | Admitting: OBSTETRICS & GYNECOLOGY

## 2021-09-13 DIAGNOSIS — Z34.83 PRENATAL CARE, SUBSEQUENT PREGNANCY IN THIRD TRIMESTER: ICD-10-CM

## 2021-09-13 DIAGNOSIS — E11.9 TYPE 2 DIABETES MELLITUS WITHOUT COMPLICATIONS (H): ICD-10-CM

## 2021-09-13 PROCEDURE — 76819 FETAL BIOPHYS PROFIL W/O NST: CPT

## 2021-09-15 ENCOUNTER — MYC MEDICAL ADVICE (OUTPATIENT)
Dept: EDUCATION SERVICES | Facility: CLINIC | Age: 36
End: 2021-09-15

## 2021-09-15 ENCOUNTER — HOSPITAL ENCOUNTER (OUTPATIENT)
Dept: ULTRASOUND IMAGING | Facility: CLINIC | Age: 36
Discharge: HOME | End: 2021-09-15
Attending: OBSTETRICS & GYNECOLOGY | Admitting: OBSTETRICS & GYNECOLOGY

## 2021-09-15 ENCOUNTER — PRENATAL OFFICE VISIT (OUTPATIENT)
Dept: OBGYN | Facility: CLINIC | Age: 36
End: 2021-09-15

## 2021-09-15 VITALS
BODY MASS INDEX: 45.32 KG/M2 | WEIGHT: 272 LBS | SYSTOLIC BLOOD PRESSURE: 127 MMHG | TEMPERATURE: 97.9 F | RESPIRATION RATE: 18 BRPM | HEIGHT: 65 IN | HEART RATE: 96 BPM | DIASTOLIC BLOOD PRESSURE: 79 MMHG

## 2021-09-15 DIAGNOSIS — Z23 NEED FOR PROPHYLACTIC VACCINATION AND INOCULATION AGAINST INFLUENZA: ICD-10-CM

## 2021-09-15 DIAGNOSIS — E11.9 TYPE 2 DIABETES MELLITUS WITHOUT COMPLICATION, UNSPECIFIED WHETHER LONG TERM INSULIN USE (H): ICD-10-CM

## 2021-09-15 DIAGNOSIS — E11.9 TYPE 2 DIABETES MELLITUS WITHOUT COMPLICATIONS (H): ICD-10-CM

## 2021-09-15 DIAGNOSIS — Z34.83 PRENATAL CARE, SUBSEQUENT PREGNANCY IN THIRD TRIMESTER: Primary | ICD-10-CM

## 2021-09-15 DIAGNOSIS — Z34.83 PRENATAL CARE, SUBSEQUENT PREGNANCY IN THIRD TRIMESTER: ICD-10-CM

## 2021-09-15 PROCEDURE — 76819 FETAL BIOPHYS PROFIL W/O NST: CPT

## 2021-09-15 PROCEDURE — 99207 PR PRENATAL VISIT: CPT | Performed by: OBSTETRICS & GYNECOLOGY

## 2021-09-15 PROCEDURE — 90686 IIV4 VACC NO PRSV 0.5 ML IM: CPT | Performed by: OBSTETRICS & GYNECOLOGY

## 2021-09-15 PROCEDURE — 90471 IMMUNIZATION ADMIN: CPT | Performed by: OBSTETRICS & GYNECOLOGY

## 2021-09-15 RX ORDER — HUMAN INSULIN 100 [IU]/ML
INJECTION, SUSPENSION SUBCUTANEOUS
Qty: 30 ML | Refills: 2 | Status: SHIPPED | OUTPATIENT
Start: 2021-09-15 | End: 2021-09-22

## 2021-09-15 ASSESSMENT — MIFFLIN-ST. JEOR: SCORE: 1906.72

## 2021-09-15 NOTE — PROGRESS NOTES
"CC: Here for routine prenatal visit @ 35w2d   HPI:  Pt states BS have been much harder to keep in normal range the past 2 weeks, seeing some 170's at times; has growth sonogram next Wednesday    PE: /79 (BP Location: Right arm, Patient Position: Chair, Cuff Size: Adult Large)   Pulse 96   Temp 97.9  F (36.6  C) (Tympanic)   Resp 18   Ht 1.638 m (5' 4.5\")   Wt 123.4 kg (272 lb)   LMP  (LMP Unknown)   Breastfeeding No   BMI 45.97 kg/m     See OB flowsheet      A:  1. Prenatal care, subsequent pregnancy in third trimester      2. Type 2 diabetes mellitus without complication, unspecified whether long term insulin use (H)      Flu shot given today  Routine prenatal care  RTC 1 weeks.:  Will determine if sonogram data supports diabetic fetopathy, and delivery at 37 weeks gestation      Kaity Street M.D.     "

## 2021-09-15 NOTE — NURSING NOTE
"Initial /79 (BP Location: Right arm, Patient Position: Chair, Cuff Size: Adult Large)   Pulse 96   Temp 97.9  F (36.6  C) (Tympanic)   Resp 18   Ht 1.638 m (5' 4.5\")   Wt 123.4 kg (272 lb)   LMP  (LMP Unknown)   Breastfeeding No   BMI 45.97 kg/m   Estimated body mass index is 45.97 kg/m  as calculated from the following:    Height as of this encounter: 1.638 m (5' 4.5\").    Weight as of this encounter: 123.4 kg (272 lb). .      "

## 2021-09-15 NOTE — TELEPHONE ENCOUNTER
Gestational Diabetes Follow-up    Subjective/Objective:    Mary Alice Jordan sent in blood glucose log for review. Last date of communication was: 9/10/2021.    Gestational diabetes is being managed with diet, activity and medications    Taking diabetes medications:   yes:     Diabetes Medication(s)     Insulin       insulin NPH (NOVOLIN N FLEXPEN) 100 UNIT/ML injection    5 units before lunch and 62 units at bedtime + 2 unit prime = 69 units TDD. May substitute with Humulin N (Kwik pen) if insurance prefers.          Estimated Date of Delivery: Oct 18, 2021    BG/Food Log:       Assessment:    Ketones: na.   Fasting blood glucoses: 25% in target.  After breakfast: 66% in target.  After lunch: 100% in target.  After dinner: 33% in target.    On 9/10, it was recommended to start 5 units NPH before lunch to help with dinner numbers, however it appears patient is taking 5 units before lunch AND 5 units before dinner?    Plan/Response:  Recommend increase to insulin - NPH 0-5-5-62 ----> 0-7-0-70  Follow up Monday    SAKSHI Gallagher CDCES    Any diabetes medication dose changes were made via the CDE Protocol and Collaborative Practice Agreement with the patient's OB/GYN provider. A copy of this encounter was shared with the provider.

## 2021-09-18 ENCOUNTER — HEALTH MAINTENANCE LETTER (OUTPATIENT)
Age: 36
End: 2021-09-18

## 2021-09-20 ENCOUNTER — HOSPITAL ENCOUNTER (OUTPATIENT)
Dept: ULTRASOUND IMAGING | Facility: CLINIC | Age: 36
Discharge: HOME | End: 2021-09-20
Attending: OBSTETRICS & GYNECOLOGY | Admitting: OBSTETRICS & GYNECOLOGY

## 2021-09-20 DIAGNOSIS — Z34.83 PRENATAL CARE, SUBSEQUENT PREGNANCY IN THIRD TRIMESTER: ICD-10-CM

## 2021-09-20 DIAGNOSIS — E11.9 TYPE 2 DIABETES MELLITUS WITHOUT COMPLICATIONS (H): ICD-10-CM

## 2021-09-20 PROCEDURE — 76819 FETAL BIOPHYS PROFIL W/O NST: CPT

## 2021-09-20 PROCEDURE — 76816 OB US FOLLOW-UP PER FETUS: CPT

## 2021-09-22 ENCOUNTER — MYC MEDICAL ADVICE (OUTPATIENT)
Dept: EDUCATION SERVICES | Facility: CLINIC | Age: 36
End: 2021-09-22

## 2021-09-22 ENCOUNTER — HOSPITAL ENCOUNTER (OUTPATIENT)
Dept: ULTRASOUND IMAGING | Facility: CLINIC | Age: 36
Discharge: HOME | End: 2021-09-22
Attending: OBSTETRICS & GYNECOLOGY | Admitting: OBSTETRICS & GYNECOLOGY

## 2021-09-22 DIAGNOSIS — E11.9 TYPE 2 DIABETES MELLITUS WITHOUT COMPLICATIONS (H): ICD-10-CM

## 2021-09-22 DIAGNOSIS — Z34.83 PRENATAL CARE, SUBSEQUENT PREGNANCY IN THIRD TRIMESTER: ICD-10-CM

## 2021-09-22 DIAGNOSIS — E11.9 TYPE 2 DIABETES MELLITUS WITHOUT COMPLICATION, UNSPECIFIED WHETHER LONG TERM INSULIN USE (H): ICD-10-CM

## 2021-09-22 PROCEDURE — 76819 FETAL BIOPHYS PROFIL W/O NST: CPT

## 2021-09-22 RX ORDER — HUMAN INSULIN 100 [IU]/ML
INJECTION, SUSPENSION SUBCUTANEOUS
Qty: 30 ML | Refills: 2 | Status: ON HOLD | OUTPATIENT
Start: 2021-09-22 | End: 2021-09-30

## 2021-09-22 NOTE — TELEPHONE ENCOUNTER
Gestational Diabetes Follow-up    Subjective/Objective:    Mary Alice Jordan sent in blood glucose log for review. Last date of communication was: 9/15/2021    Gestational diabetes is being managed with diet, activity and medications    Taking diabetes medications:   yes:     Diabetes Medication(s)     Insulin       insulin NPH (NOVOLIN N FLEXPEN) 100 UNIT/ML injection    7 units before lunch and 70 units at bedtime + 2 unit prime = 81 units TDD. May substitute with Humulin N (Kwik pen) if insurance prefers.          Estimated Date of Delivery: Oct 18, 2021    BG/Food Log:       Assessment:    Ketones: na.   Fasting blood glucoses: 83% in target.  After breakfast: 66% in target.  Before lunch: 40% in target.  After lunch: 100% in target.  After dinner: 40% in target.    Plan/Response:  Recommend adjust to insulin - NPH 7-0-0-70 ---> 9-0-0-63 (10% evening decrease)  Follow up in 1 week    SAKSHI Gallagher CDCES    Any diabetes medication dose changes were made via the CDE Protocol and Collaborative Practice Agreement with the patient's OB/GYN provider. A copy of this encounter was shared with the provider.

## 2021-09-23 ENCOUNTER — PRENATAL OFFICE VISIT (OUTPATIENT)
Dept: OBGYN | Facility: CLINIC | Age: 36
End: 2021-09-23

## 2021-09-23 ENCOUNTER — TELEPHONE (OUTPATIENT)
Dept: FAMILY MEDICINE | Facility: CLINIC | Age: 36
End: 2021-09-23

## 2021-09-23 VITALS
BODY MASS INDEX: 45.32 KG/M2 | HEART RATE: 105 BPM | RESPIRATION RATE: 18 BRPM | HEIGHT: 65 IN | TEMPERATURE: 99.2 F | WEIGHT: 272 LBS | SYSTOLIC BLOOD PRESSURE: 124 MMHG | DIASTOLIC BLOOD PRESSURE: 79 MMHG

## 2021-09-23 DIAGNOSIS — E11.9 TYPE 2 DIABETES MELLITUS WITHOUT COMPLICATION, UNSPECIFIED WHETHER LONG TERM INSULIN USE (H): ICD-10-CM

## 2021-09-23 DIAGNOSIS — Z34.83 PRENATAL CARE, SUBSEQUENT PREGNANCY IN THIRD TRIMESTER: Primary | ICD-10-CM

## 2021-09-23 PROCEDURE — 99207 PR PRENATAL VISIT: CPT | Performed by: OBSTETRICS & GYNECOLOGY

## 2021-09-23 PROCEDURE — 87653 STREP B DNA AMP PROBE: CPT | Performed by: OBSTETRICS & GYNECOLOGY

## 2021-09-23 RX ORDER — METOCLOPRAMIDE HYDROCHLORIDE 5 MG/ML
10 INJECTION INTRAMUSCULAR; INTRAVENOUS EVERY 6 HOURS PRN
Status: CANCELLED | OUTPATIENT
Start: 2021-09-23

## 2021-09-23 RX ORDER — SODIUM CHLORIDE 9 MG/ML
INJECTION, SOLUTION INTRAVENOUS CONTINUOUS
Status: CANCELLED | OUTPATIENT
Start: 2021-09-23

## 2021-09-23 RX ORDER — LIDOCAINE 40 MG/G
CREAM TOPICAL
Status: CANCELLED | OUTPATIENT
Start: 2021-09-23

## 2021-09-23 RX ORDER — CARBOPROST TROMETHAMINE 250 UG/ML
250 INJECTION, SOLUTION INTRAMUSCULAR
Status: CANCELLED | OUTPATIENT
Start: 2021-09-23

## 2021-09-23 RX ORDER — NALOXONE HYDROCHLORIDE 0.4 MG/ML
0.4 INJECTION, SOLUTION INTRAMUSCULAR; INTRAVENOUS; SUBCUTANEOUS
Status: CANCELLED | OUTPATIENT
Start: 2021-09-23

## 2021-09-23 RX ORDER — MISOPROSTOL 100 UG/1
25 TABLET ORAL
Status: CANCELLED | OUTPATIENT
Start: 2021-09-23

## 2021-09-23 RX ORDER — KETOROLAC TROMETHAMINE 30 MG/ML
30 INJECTION, SOLUTION INTRAMUSCULAR; INTRAVENOUS
Status: CANCELLED | OUTPATIENT
Start: 2021-09-23 | End: 2021-09-28

## 2021-09-23 RX ORDER — ONDANSETRON 2 MG/ML
4 INJECTION INTRAMUSCULAR; INTRAVENOUS EVERY 6 HOURS PRN
Status: CANCELLED | OUTPATIENT
Start: 2021-09-23

## 2021-09-23 RX ORDER — METHYLERGONOVINE MALEATE 0.2 MG/ML
200 INJECTION INTRAVENOUS
Status: CANCELLED | OUTPATIENT
Start: 2021-09-23

## 2021-09-23 RX ORDER — METOCLOPRAMIDE 10 MG/1
10 TABLET ORAL EVERY 6 HOURS PRN
Status: CANCELLED | OUTPATIENT
Start: 2021-09-23

## 2021-09-23 RX ORDER — NALOXONE HYDROCHLORIDE 0.4 MG/ML
0.2 INJECTION, SOLUTION INTRAMUSCULAR; INTRAVENOUS; SUBCUTANEOUS
Status: CANCELLED | OUTPATIENT
Start: 2021-09-23

## 2021-09-23 RX ORDER — OXYTOCIN/0.9 % SODIUM CHLORIDE 30/500 ML
1-24 PLASTIC BAG, INJECTION (ML) INTRAVENOUS CONTINUOUS
Status: CANCELLED | OUTPATIENT
Start: 2021-09-23

## 2021-09-23 RX ORDER — MISOPROSTOL 200 UG/1
800 TABLET ORAL
Status: CANCELLED | OUTPATIENT
Start: 2021-09-23

## 2021-09-23 RX ORDER — OXYTOCIN 10 [USP'U]/ML
10 INJECTION, SOLUTION INTRAMUSCULAR; INTRAVENOUS
Status: CANCELLED | OUTPATIENT
Start: 2021-09-23

## 2021-09-23 RX ORDER — OXYTOCIN/0.9 % SODIUM CHLORIDE 30/500 ML
340 PLASTIC BAG, INJECTION (ML) INTRAVENOUS CONTINUOUS PRN
Status: CANCELLED | OUTPATIENT
Start: 2021-09-23

## 2021-09-23 RX ORDER — PROCHLORPERAZINE MALEATE 10 MG
10 TABLET ORAL EVERY 6 HOURS PRN
Status: CANCELLED | OUTPATIENT
Start: 2021-09-23

## 2021-09-23 RX ORDER — TRANEXAMIC ACID 10 MG/ML
1 INJECTION, SOLUTION INTRAVENOUS EVERY 30 MIN PRN
Status: CANCELLED | OUTPATIENT
Start: 2021-09-23

## 2021-09-23 RX ORDER — IBUPROFEN 200 MG
600 TABLET ORAL
Status: CANCELLED | OUTPATIENT
Start: 2021-09-23 | End: 2021-09-28

## 2021-09-23 RX ORDER — MISOPROSTOL 200 UG/1
400 TABLET ORAL
Status: CANCELLED | OUTPATIENT
Start: 2021-09-23

## 2021-09-23 RX ORDER — DEXTROSE MONOHYDRATE 25 G/50ML
25-50 INJECTION, SOLUTION INTRAVENOUS
Status: CANCELLED | OUTPATIENT
Start: 2021-09-23

## 2021-09-23 RX ORDER — ONDANSETRON 4 MG/1
4 TABLET, ORALLY DISINTEGRATING ORAL EVERY 6 HOURS PRN
Status: CANCELLED | OUTPATIENT
Start: 2021-09-23

## 2021-09-23 RX ORDER — OXYTOCIN/0.9 % SODIUM CHLORIDE 30/500 ML
100-340 PLASTIC BAG, INJECTION (ML) INTRAVENOUS CONTINUOUS PRN
Status: CANCELLED | OUTPATIENT
Start: 2021-09-23

## 2021-09-23 RX ORDER — NICOTINE POLACRILEX 4 MG
15-30 LOZENGE BUCCAL
Status: CANCELLED | OUTPATIENT
Start: 2021-09-23

## 2021-09-23 RX ORDER — SODIUM CHLORIDE, SODIUM LACTATE, POTASSIUM CHLORIDE, CALCIUM CHLORIDE 600; 310; 30; 20 MG/100ML; MG/100ML; MG/100ML; MG/100ML
INJECTION, SOLUTION INTRAVENOUS CONTINUOUS
Status: CANCELLED | OUTPATIENT
Start: 2021-09-23

## 2021-09-23 RX ORDER — PROCHLORPERAZINE 25 MG
25 SUPPOSITORY, RECTAL RECTAL EVERY 12 HOURS PRN
Status: CANCELLED | OUTPATIENT
Start: 2021-09-23

## 2021-09-23 RX ORDER — FENTANYL CITRATE 50 UG/ML
50-100 INJECTION, SOLUTION INTRAMUSCULAR; INTRAVENOUS
Status: CANCELLED | OUTPATIENT
Start: 2021-09-23

## 2021-09-23 ASSESSMENT — MIFFLIN-ST. JEOR: SCORE: 1906.72

## 2021-09-23 NOTE — TELEPHONE ENCOUNTER
S-(situation): The patient had vaginal exam today and has had some brown discharge.    B-(background): the patient is 36 weeks pregnant.    A-(assessment): The patient reports she did have vaginal exam today in clinic.  This evening she noticed some brown discharged when she wiped.  It was about the size of a 50 cent piece. The patient has felt baby move. No contractions.    R-(recommendations): Advised patient that is normal after exam.  If it worsens to call the labor and delivery for guidance. Advised patient if other symptoms such as contractions or low fetal movement to have call the birth center. The patient agrees and understands.    Trang PUENTE RN

## 2021-09-23 NOTE — Clinical Note
ripening/IOL  due to poorly controlled DM2  Orders in  Kaity Street MD  [facility]

## 2021-09-23 NOTE — NURSING NOTE
"Initial /79 (BP Location: Right arm, Patient Position: Chair, Cuff Size: Adult Large)   Pulse 105   Temp 99.2  F (37.3  C) (Tympanic)   Resp 18   Ht 1.638 m (5' 4.5\")   Wt 123.4 kg (272 lb)   LMP  (LMP Unknown)   Breastfeeding No   BMI 45.97 kg/m   Estimated body mass index is 45.97 kg/m  as calculated from the following:    Height as of this encounter: 1.638 m (5' 4.5\").    Weight as of this encounter: 123.4 kg (272 lb). .      "

## 2021-09-23 NOTE — PROGRESS NOTES
"CC: Here for routine prenatal visit @ 36w3d   HPI:  Mary Alice has noticed serious change in her insulin needs this past week, is having very low numbers; she has had to cut the dose from 90 to 60 U to keep from bottoming out;  BPP 8/8; EFW 9/20-3100gm (78%); normal AFV  I discussed with her the concern about impending placental insufficiency given her significant decrease in insulin need, and recommend move towards delivery at 37 weeks (next Monday 9/27/21)    PE: /79 (BP Location: Right arm, Patient Position: Chair, Cuff Size: Adult Large)   Pulse 105   Temp 99.2  F (37.3  C) (Tympanic)   Resp 18   Ht 1.638 m (5' 4.5\")   Wt 123.4 kg (272 lb)   LMP  (LMP Unknown)   Breastfeeding No   BMI 45.97 kg/m     See OB flowsheet  Cervix 1+/long/-3/soft/posterior    A:  1. Prenatal care, subsequent pregnancy in third trimester    - Group B strep PCR  - Asymptomatic COVID-19 Virus (Coronavirus) by PCR; Future    2. Type 2 diabetes mellitus without complication, unspecified whether long term insulin use (H)    - Asymptomatic COVID-19 Virus (Coronavirus) by PCR; Future    Recommend cervical ripening/IOL at 37 weeks given significant insulin requirement changes; pt is amenable; scheduled for 9/27/21      Kaity Street M.D.     "

## 2021-09-24 ENCOUNTER — LAB (OUTPATIENT)
Dept: LAB | Facility: CLINIC | Age: 36
End: 2021-09-24
Payer: COMMERCIAL

## 2021-09-24 DIAGNOSIS — E11.9 TYPE 2 DIABETES MELLITUS WITHOUT COMPLICATION, UNSPECIFIED WHETHER LONG TERM INSULIN USE (H): ICD-10-CM

## 2021-09-24 DIAGNOSIS — Z34.83 PRENATAL CARE, SUBSEQUENT PREGNANCY IN THIRD TRIMESTER: ICD-10-CM

## 2021-09-24 LAB
GP B STREP DNA SPEC QL NAA+PROBE: NEGATIVE
PATIENT PENICILLIN, AMOXICILLIN, CEPHALOSPORINS ALLERGY: NO

## 2021-09-24 PROCEDURE — U0005 INFEC AGEN DETEC AMPLI PROBE: HCPCS

## 2021-09-24 PROCEDURE — U0003 INFECTIOUS AGENT DETECTION BY NUCLEIC ACID (DNA OR RNA); SEVERE ACUTE RESPIRATORY SYNDROME CORONAVIRUS 2 (SARS-COV-2) (CORONAVIRUS DISEASE [COVID-19]), AMPLIFIED PROBE TECHNIQUE, MAKING USE OF HIGH THROUGHPUT TECHNOLOGIES AS DESCRIBED BY CMS-2020-01-R: HCPCS

## 2021-09-25 LAB — SARS-COV-2 RNA RESP QL NAA+PROBE: NEGATIVE

## 2021-09-27 ENCOUNTER — HOSPITAL ENCOUNTER (INPATIENT)
Facility: CLINIC | Age: 36
LOS: 3 days | Discharge: HOME | End: 2021-09-30
Attending: OBSTETRICS & GYNECOLOGY | Admitting: OBSTETRICS & GYNECOLOGY
Payer: COMMERCIAL

## 2021-09-27 PROBLEM — E11.9 TYPE 2 DIABETES MELLITUS WITHOUT COMPLICATION, UNSPECIFIED WHETHER LONG TERM INSULIN USE (H): Status: ACTIVE | Noted: 2021-09-27

## 2021-09-27 PROBLEM — Z34.83 PRENATAL CARE, SUBSEQUENT PREGNANCY IN THIRD TRIMESTER: Status: ACTIVE | Noted: 2021-09-27

## 2021-09-27 LAB
ABO/RH(D): NORMAL
ANTIBODY SCREEN: NEGATIVE
BASOPHILS # BLD AUTO: 0 10E3/UL (ref 0–0.2)
BASOPHILS NFR BLD AUTO: 0 %
EOSINOPHIL # BLD AUTO: 0.2 10E3/UL (ref 0–0.7)
EOSINOPHIL NFR BLD AUTO: 2 %
ERYTHROCYTE [DISTWIDTH] IN BLOOD BY AUTOMATED COUNT: 15.2 % (ref 10–15)
GLUCOSE BLDC GLUCOMTR-MCNC: 104 MG/DL (ref 70–99)
HCT VFR BLD AUTO: 32.9 % (ref 35–47)
HGB BLD-MCNC: 10.6 G/DL (ref 11.7–15.7)
IMM GRANULOCYTES # BLD: 0.1 10E3/UL
IMM GRANULOCYTES NFR BLD: 1 %
LYMPHOCYTES # BLD AUTO: 1.8 10E3/UL (ref 0.8–5.3)
LYMPHOCYTES NFR BLD AUTO: 18 %
MCH RBC QN AUTO: 25.1 PG (ref 26.5–33)
MCHC RBC AUTO-ENTMCNC: 32.2 G/DL (ref 31.5–36.5)
MCV RBC AUTO: 78 FL (ref 78–100)
MONOCYTES # BLD AUTO: 0.7 10E3/UL (ref 0–1.3)
MONOCYTES NFR BLD AUTO: 7 %
NEUTROPHILS # BLD AUTO: 7.3 10E3/UL (ref 1.6–8.3)
NEUTROPHILS NFR BLD AUTO: 72 %
NRBC # BLD AUTO: 0 10E3/UL
NRBC BLD AUTO-RTO: 0 /100
PLATELET # BLD AUTO: 212 10E3/UL (ref 150–450)
RBC # BLD AUTO: 4.22 10E6/UL (ref 3.8–5.2)
SPECIMEN EXPIRATION DATE: NORMAL
WBC # BLD AUTO: 10.2 10E3/UL (ref 4–11)

## 2021-09-27 PROCEDURE — 86780 TREPONEMA PALLIDUM: CPT | Performed by: OBSTETRICS & GYNECOLOGY

## 2021-09-27 PROCEDURE — 36415 COLL VENOUS BLD VENIPUNCTURE: CPT | Performed by: OBSTETRICS & GYNECOLOGY

## 2021-09-27 PROCEDURE — 250N000013 HC RX MED GY IP 250 OP 250 PS 637: Performed by: OBSTETRICS & GYNECOLOGY

## 2021-09-27 PROCEDURE — 120N000001 HC R&B MED SURG/OB

## 2021-09-27 PROCEDURE — 86900 BLOOD TYPING SEROLOGIC ABO: CPT | Performed by: OBSTETRICS & GYNECOLOGY

## 2021-09-27 PROCEDURE — 250N000012 HC RX MED GY IP 250 OP 636 PS 637: Performed by: OBSTETRICS & GYNECOLOGY

## 2021-09-27 PROCEDURE — 59025 FETAL NON-STRESS TEST: CPT | Mod: 26 | Performed by: OBSTETRICS & GYNECOLOGY

## 2021-09-27 PROCEDURE — 3E0P7VZ INTRODUCTION OF HORMONE INTO FEMALE REPRODUCTIVE, VIA NATURAL OR ARTIFICIAL OPENING: ICD-10-PCS | Performed by: OBSTETRICS & GYNECOLOGY

## 2021-09-27 PROCEDURE — 85025 COMPLETE CBC W/AUTO DIFF WBC: CPT | Performed by: OBSTETRICS & GYNECOLOGY

## 2021-09-27 RX ORDER — TRANEXAMIC ACID 10 MG/ML
1 INJECTION, SOLUTION INTRAVENOUS EVERY 30 MIN PRN
Status: DISCONTINUED | OUTPATIENT
Start: 2021-09-27 | End: 2021-09-28

## 2021-09-27 RX ORDER — PROCHLORPERAZINE 25 MG
25 SUPPOSITORY, RECTAL RECTAL EVERY 12 HOURS PRN
Status: DISCONTINUED | OUTPATIENT
Start: 2021-09-27 | End: 2021-09-28

## 2021-09-27 RX ORDER — FENTANYL CITRATE 50 UG/ML
50-100 INJECTION, SOLUTION INTRAMUSCULAR; INTRAVENOUS
Status: DISCONTINUED | OUTPATIENT
Start: 2021-09-27 | End: 2021-09-28

## 2021-09-27 RX ORDER — ONDANSETRON 2 MG/ML
4 INJECTION INTRAMUSCULAR; INTRAVENOUS EVERY 6 HOURS PRN
Status: DISCONTINUED | OUTPATIENT
Start: 2021-09-27 | End: 2021-09-28

## 2021-09-27 RX ORDER — NALOXONE HYDROCHLORIDE 0.4 MG/ML
0.4 INJECTION, SOLUTION INTRAMUSCULAR; INTRAVENOUS; SUBCUTANEOUS
Status: DISCONTINUED | OUTPATIENT
Start: 2021-09-27 | End: 2021-09-28

## 2021-09-27 RX ORDER — MISOPROSTOL 200 UG/1
800 TABLET ORAL
Status: DISCONTINUED | OUTPATIENT
Start: 2021-09-27 | End: 2021-09-28

## 2021-09-27 RX ORDER — MISOPROSTOL 100 UG/1
25 TABLET ORAL
Status: DISCONTINUED | OUTPATIENT
Start: 2021-09-27 | End: 2021-09-28

## 2021-09-27 RX ORDER — PROCHLORPERAZINE MALEATE 10 MG
10 TABLET ORAL EVERY 6 HOURS PRN
Status: DISCONTINUED | OUTPATIENT
Start: 2021-09-27 | End: 2021-09-28

## 2021-09-27 RX ORDER — LIDOCAINE 40 MG/G
CREAM TOPICAL
Status: DISCONTINUED | OUTPATIENT
Start: 2021-09-27 | End: 2021-09-28

## 2021-09-27 RX ORDER — SODIUM CHLORIDE, SODIUM LACTATE, POTASSIUM CHLORIDE, CALCIUM CHLORIDE 600; 310; 30; 20 MG/100ML; MG/100ML; MG/100ML; MG/100ML
INJECTION, SOLUTION INTRAVENOUS CONTINUOUS
Status: DISCONTINUED | OUTPATIENT
Start: 2021-09-27 | End: 2021-09-28

## 2021-09-27 RX ORDER — DEXTROSE MONOHYDRATE 25 G/50ML
25-50 INJECTION, SOLUTION INTRAVENOUS
Status: DISCONTINUED | OUTPATIENT
Start: 2021-09-27 | End: 2021-09-28

## 2021-09-27 RX ORDER — KETOROLAC TROMETHAMINE 30 MG/ML
30 INJECTION, SOLUTION INTRAMUSCULAR; INTRAVENOUS
Status: DISCONTINUED | OUTPATIENT
Start: 2021-09-27 | End: 2021-09-28

## 2021-09-27 RX ORDER — METHYLERGONOVINE MALEATE 0.2 MG/ML
200 INJECTION INTRAVENOUS
Status: DISCONTINUED | OUTPATIENT
Start: 2021-09-27 | End: 2021-09-28

## 2021-09-27 RX ORDER — OXYTOCIN/0.9 % SODIUM CHLORIDE 30/500 ML
1-24 PLASTIC BAG, INJECTION (ML) INTRAVENOUS CONTINUOUS
Status: DISCONTINUED | OUTPATIENT
Start: 2021-09-27 | End: 2021-09-28

## 2021-09-27 RX ORDER — CARBOPROST TROMETHAMINE 250 UG/ML
250 INJECTION, SOLUTION INTRAMUSCULAR
Status: DISCONTINUED | OUTPATIENT
Start: 2021-09-27 | End: 2021-09-28

## 2021-09-27 RX ORDER — OXYTOCIN 10 [USP'U]/ML
10 INJECTION, SOLUTION INTRAMUSCULAR; INTRAVENOUS
Status: DISCONTINUED | OUTPATIENT
Start: 2021-09-27 | End: 2021-09-28

## 2021-09-27 RX ORDER — OXYTOCIN/0.9 % SODIUM CHLORIDE 30/500 ML
100-340 PLASTIC BAG, INJECTION (ML) INTRAVENOUS CONTINUOUS PRN
Status: DISCONTINUED | OUTPATIENT
Start: 2021-09-27 | End: 2021-09-28

## 2021-09-27 RX ORDER — SODIUM CHLORIDE 9 MG/ML
INJECTION, SOLUTION INTRAVENOUS CONTINUOUS
Status: DISCONTINUED | OUTPATIENT
Start: 2021-09-27 | End: 2021-09-28

## 2021-09-27 RX ORDER — ONDANSETRON 4 MG/1
4 TABLET, ORALLY DISINTEGRATING ORAL EVERY 6 HOURS PRN
Status: DISCONTINUED | OUTPATIENT
Start: 2021-09-27 | End: 2021-09-28

## 2021-09-27 RX ORDER — NALOXONE HYDROCHLORIDE 0.4 MG/ML
0.2 INJECTION, SOLUTION INTRAMUSCULAR; INTRAVENOUS; SUBCUTANEOUS
Status: DISCONTINUED | OUTPATIENT
Start: 2021-09-27 | End: 2021-09-28

## 2021-09-27 RX ORDER — NICOTINE POLACRILEX 4 MG
15-30 LOZENGE BUCCAL
Status: DISCONTINUED | OUTPATIENT
Start: 2021-09-27 | End: 2021-09-28

## 2021-09-27 RX ORDER — OXYTOCIN/0.9 % SODIUM CHLORIDE 30/500 ML
340 PLASTIC BAG, INJECTION (ML) INTRAVENOUS CONTINUOUS PRN
Status: DISCONTINUED | OUTPATIENT
Start: 2021-09-27 | End: 2021-09-28

## 2021-09-27 RX ORDER — IBUPROFEN 600 MG/1
600 TABLET, FILM COATED ORAL
Status: DISCONTINUED | OUTPATIENT
Start: 2021-09-27 | End: 2021-09-28

## 2021-09-27 RX ORDER — MISOPROSTOL 200 UG/1
400 TABLET ORAL
Status: DISCONTINUED | OUTPATIENT
Start: 2021-09-27 | End: 2021-09-28

## 2021-09-27 RX ORDER — METOCLOPRAMIDE 10 MG/1
10 TABLET ORAL EVERY 6 HOURS PRN
Status: DISCONTINUED | OUTPATIENT
Start: 2021-09-27 | End: 2021-09-28

## 2021-09-27 RX ORDER — METOCLOPRAMIDE HYDROCHLORIDE 5 MG/ML
10 INJECTION INTRAMUSCULAR; INTRAVENOUS EVERY 6 HOURS PRN
Status: DISCONTINUED | OUTPATIENT
Start: 2021-09-27 | End: 2021-09-28

## 2021-09-27 RX ADMIN — Medication 25 MCG: at 21:20

## 2021-09-27 RX ADMIN — Medication 25 MCG: at 23:35

## 2021-09-27 RX ADMIN — INSULIN HUMAN 60 UNITS: 100 INJECTION, SUSPENSION SUBCUTANEOUS at 23:06

## 2021-09-27 ASSESSMENT — ACTIVITIES OF DAILY LIVING (ADL)
CONCENTRATING,_REMEMBERING_OR_MAKING_DECISIONS_DIFFICULTY: NO
TOILETING_ISSUES: NO
WALKING_OR_CLIMBING_STAIRS_DIFFICULTY: NO
WEAR_GLASSES_OR_BLIND: NO
DIFFICULTY_COMMUNICATING: NO
HEARING_DIFFICULTY_OR_DEAF: NO
DOING_ERRANDS_INDEPENDENTLY_DIFFICULTY: NO
FALL_HISTORY_WITHIN_LAST_SIX_MONTHS: NO
DIFFICULTY_EATING/SWALLOWING: NO
DRESSING/BATHING_DIFFICULTY: NO

## 2021-09-28 ENCOUNTER — ANESTHESIA EVENT (OUTPATIENT)
Dept: OBGYN | Facility: CLINIC | Age: 36
End: 2021-09-28

## 2021-09-28 ENCOUNTER — ANESTHESIA (OUTPATIENT)
Dept: OBGYN | Facility: CLINIC | Age: 36
End: 2021-09-28

## 2021-09-28 LAB
ALT SERPL W P-5'-P-CCNC: 18 U/L (ref 0–50)
AST SERPL W P-5'-P-CCNC: 12 U/L (ref 0–45)
CREAT SERPL-MCNC: 0.52 MG/DL (ref 0.52–1.04)
ERYTHROCYTE [DISTWIDTH] IN BLOOD BY AUTOMATED COUNT: 15.3 % (ref 10–15)
GFR SERPL CREATININE-BSD FRML MDRD: >90 ML/MIN/1.73M2
GLUCOSE BLDC GLUCOMTR-MCNC: 108 MG/DL (ref 70–99)
GLUCOSE BLDC GLUCOMTR-MCNC: 125 MG/DL (ref 70–99)
GLUCOSE BLDC GLUCOMTR-MCNC: 136 MG/DL (ref 70–99)
GLUCOSE BLDC GLUCOMTR-MCNC: 66 MG/DL (ref 70–99)
GLUCOSE BLDC GLUCOMTR-MCNC: 77 MG/DL (ref 70–99)
GLUCOSE BLDC GLUCOMTR-MCNC: 82 MG/DL (ref 70–99)
HCT VFR BLD AUTO: 32.6 % (ref 35–47)
HGB BLD-MCNC: 10.5 G/DL (ref 11.7–15.7)
MCH RBC QN AUTO: 25.1 PG (ref 26.5–33)
MCHC RBC AUTO-ENTMCNC: 32.2 G/DL (ref 31.5–36.5)
MCV RBC AUTO: 78 FL (ref 78–100)
PLATELET # BLD AUTO: 213 10E3/UL (ref 150–450)
RBC # BLD AUTO: 4.19 10E6/UL (ref 3.8–5.2)
T PALLIDUM AB SER QL: NONREACTIVE
WBC # BLD AUTO: 9.4 10E3/UL (ref 4–11)

## 2021-09-28 PROCEDURE — 84450 TRANSFERASE (AST) (SGOT): CPT | Performed by: OBSTETRICS & GYNECOLOGY

## 2021-09-28 PROCEDURE — 85027 COMPLETE CBC AUTOMATED: CPT | Performed by: OBSTETRICS & GYNECOLOGY

## 2021-09-28 PROCEDURE — 00HU33Z INSERTION OF INFUSION DEVICE INTO SPINAL CANAL, PERCUTANEOUS APPROACH: ICD-10-PCS | Performed by: NURSE ANESTHETIST, CERTIFIED REGISTERED

## 2021-09-28 PROCEDURE — 250N000009 HC RX 250: Performed by: NURSE ANESTHETIST, CERTIFIED REGISTERED

## 2021-09-28 PROCEDURE — 3E0R3BZ INTRODUCTION OF ANESTHETIC AGENT INTO SPINAL CANAL, PERCUTANEOUS APPROACH: ICD-10-PCS | Performed by: NURSE ANESTHETIST, CERTIFIED REGISTERED

## 2021-09-28 PROCEDURE — 84460 ALANINE AMINO (ALT) (SGPT): CPT | Performed by: OBSTETRICS & GYNECOLOGY

## 2021-09-28 PROCEDURE — 258N000003 HC RX IP 258 OP 636: Performed by: OBSTETRICS & GYNECOLOGY

## 2021-09-28 PROCEDURE — 82565 ASSAY OF CREATININE: CPT | Performed by: OBSTETRICS & GYNECOLOGY

## 2021-09-28 PROCEDURE — 250N000011 HC RX IP 250 OP 636: Performed by: NURSE ANESTHETIST, CERTIFIED REGISTERED

## 2021-09-28 PROCEDURE — 370N000003 HC ANESTHESIA WARD SERVICE

## 2021-09-28 PROCEDURE — 36415 COLL VENOUS BLD VENIPUNCTURE: CPT | Performed by: OBSTETRICS & GYNECOLOGY

## 2021-09-28 PROCEDURE — 250N000011 HC RX IP 250 OP 636: Performed by: OBSTETRICS & GYNECOLOGY

## 2021-09-28 PROCEDURE — 258N000003 HC RX IP 258 OP 636: Performed by: NURSE ANESTHETIST, CERTIFIED REGISTERED

## 2021-09-28 PROCEDURE — 10907ZC DRAINAGE OF AMNIOTIC FLUID, THERAPEUTIC FROM PRODUCTS OF CONCEPTION, VIA NATURAL OR ARTIFICIAL OPENING: ICD-10-PCS | Performed by: OBSTETRICS & GYNECOLOGY

## 2021-09-28 PROCEDURE — 120N000001 HC R&B MED SURG/OB

## 2021-09-28 PROCEDURE — 59400 OBSTETRICAL CARE: CPT | Performed by: OBSTETRICS & GYNECOLOGY

## 2021-09-28 PROCEDURE — 250N000013 HC RX MED GY IP 250 OP 250 PS 637: Performed by: OBSTETRICS & GYNECOLOGY

## 2021-09-28 PROCEDURE — 3E033VJ INTRODUCTION OF OTHER HORMONE INTO PERIPHERAL VEIN, PERCUTANEOUS APPROACH: ICD-10-PCS | Performed by: OBSTETRICS & GYNECOLOGY

## 2021-09-28 PROCEDURE — 722N000001 HC LABOR CARE VAGINAL DELIVERY SINGLE

## 2021-09-28 PROCEDURE — 250N000009 HC RX 250: Performed by: OBSTETRICS & GYNECOLOGY

## 2021-09-28 RX ORDER — FENTANYL CITRATE 50 UG/ML
INJECTION, SOLUTION INTRAMUSCULAR; INTRAVENOUS
Status: COMPLETED
Start: 2021-09-28 | End: 2021-09-28

## 2021-09-28 RX ORDER — IBUPROFEN 800 MG/1
800 TABLET, FILM COATED ORAL EVERY 6 HOURS PRN
Status: DISCONTINUED | OUTPATIENT
Start: 2021-09-28 | End: 2021-09-30 | Stop reason: HOSPADM

## 2021-09-28 RX ORDER — BISACODYL 10 MG
10 SUPPOSITORY, RECTAL RECTAL DAILY PRN
Status: DISCONTINUED | OUTPATIENT
Start: 2021-09-28 | End: 2021-09-30 | Stop reason: HOSPADM

## 2021-09-28 RX ORDER — LIDOCAINE HYDROCHLORIDE 10 MG/ML
INJECTION, SOLUTION EPIDURAL; INFILTRATION; INTRACAUDAL; PERINEURAL
Status: DISCONTINUED
Start: 2021-09-28 | End: 2021-09-28 | Stop reason: HOSPADM

## 2021-09-28 RX ORDER — METHYLERGONOVINE MALEATE 0.2 MG/ML
200 INJECTION INTRAVENOUS
Status: DISCONTINUED | OUTPATIENT
Start: 2021-09-28 | End: 2021-09-30 | Stop reason: HOSPADM

## 2021-09-28 RX ORDER — HYDROXYZINE HYDROCHLORIDE 50 MG/1
100 TABLET, FILM COATED ORAL
Status: DISCONTINUED | OUTPATIENT
Start: 2021-09-28 | End: 2021-09-28

## 2021-09-28 RX ORDER — BUPIVACAINE HYDROCHLORIDE 2.5 MG/ML
INJECTION, SOLUTION EPIDURAL; INFILTRATION; INTRACAUDAL PRN
Status: DISCONTINUED | OUTPATIENT
Start: 2021-09-28 | End: 2021-09-28

## 2021-09-28 RX ORDER — NALOXONE HYDROCHLORIDE 0.4 MG/ML
0.4 INJECTION, SOLUTION INTRAMUSCULAR; INTRAVENOUS; SUBCUTANEOUS
Status: DISCONTINUED | OUTPATIENT
Start: 2021-09-28 | End: 2021-09-30 | Stop reason: HOSPADM

## 2021-09-28 RX ORDER — OXYTOCIN/0.9 % SODIUM CHLORIDE 30/500 ML
1-24 PLASTIC BAG, INJECTION (ML) INTRAVENOUS CONTINUOUS
Status: DISCONTINUED | OUTPATIENT
Start: 2021-09-28 | End: 2021-09-28

## 2021-09-28 RX ORDER — HYDROCORTISONE 2.5 %
CREAM (GRAM) TOPICAL 3 TIMES DAILY PRN
Status: DISCONTINUED | OUTPATIENT
Start: 2021-09-28 | End: 2021-09-30 | Stop reason: HOSPADM

## 2021-09-28 RX ORDER — FENTANYL CITRATE 50 UG/ML
INJECTION, SOLUTION INTRAMUSCULAR; INTRAVENOUS PRN
Status: DISCONTINUED | OUTPATIENT
Start: 2021-09-28 | End: 2021-09-28

## 2021-09-28 RX ORDER — OXYCODONE HYDROCHLORIDE 5 MG/1
5 TABLET ORAL EVERY 4 HOURS PRN
Status: DISCONTINUED | OUTPATIENT
Start: 2021-09-28 | End: 2021-09-30 | Stop reason: HOSPADM

## 2021-09-28 RX ORDER — OXYTOCIN 10 [USP'U]/ML
10 INJECTION, SOLUTION INTRAMUSCULAR; INTRAVENOUS
Status: DISCONTINUED | OUTPATIENT
Start: 2021-09-28 | End: 2021-09-30 | Stop reason: HOSPADM

## 2021-09-28 RX ORDER — MISOPROSTOL 200 UG/1
400 TABLET ORAL
Status: DISCONTINUED | OUTPATIENT
Start: 2021-09-28 | End: 2021-09-30 | Stop reason: HOSPADM

## 2021-09-28 RX ORDER — EPHEDRINE SULFATE 50 MG/ML
5 INJECTION, SOLUTION INTRAMUSCULAR; INTRAVENOUS; SUBCUTANEOUS
Status: DISCONTINUED | OUTPATIENT
Start: 2021-09-28 | End: 2021-09-28

## 2021-09-28 RX ORDER — NALBUPHINE HYDROCHLORIDE 10 MG/ML
2.5-5 INJECTION, SOLUTION INTRAMUSCULAR; INTRAVENOUS; SUBCUTANEOUS EVERY 6 HOURS PRN
Status: DISCONTINUED | OUTPATIENT
Start: 2021-09-28 | End: 2021-09-28

## 2021-09-28 RX ORDER — TRANEXAMIC ACID 10 MG/ML
1 INJECTION, SOLUTION INTRAVENOUS EVERY 30 MIN PRN
Status: DISCONTINUED | OUTPATIENT
Start: 2021-09-28 | End: 2021-09-30 | Stop reason: HOSPADM

## 2021-09-28 RX ORDER — ACETAMINOPHEN 325 MG/1
650 TABLET ORAL EVERY 4 HOURS PRN
Status: DISCONTINUED | OUTPATIENT
Start: 2021-09-28 | End: 2021-09-30 | Stop reason: HOSPADM

## 2021-09-28 RX ORDER — OXYTOCIN/0.9 % SODIUM CHLORIDE 30/500 ML
340 PLASTIC BAG, INJECTION (ML) INTRAVENOUS CONTINUOUS PRN
Status: DISCONTINUED | OUTPATIENT
Start: 2021-09-28 | End: 2021-09-30 | Stop reason: HOSPADM

## 2021-09-28 RX ORDER — DOCUSATE SODIUM 100 MG/1
100 CAPSULE, LIQUID FILLED ORAL DAILY
Status: DISCONTINUED | OUTPATIENT
Start: 2021-09-28 | End: 2021-09-30 | Stop reason: HOSPADM

## 2021-09-28 RX ORDER — NALOXONE HYDROCHLORIDE 0.4 MG/ML
0.2 INJECTION, SOLUTION INTRAMUSCULAR; INTRAVENOUS; SUBCUTANEOUS
Status: DISCONTINUED | OUTPATIENT
Start: 2021-09-28 | End: 2021-09-30 | Stop reason: HOSPADM

## 2021-09-28 RX ORDER — NICOTINE POLACRILEX 4 MG
15-30 LOZENGE BUCCAL
Status: DISCONTINUED | OUTPATIENT
Start: 2021-09-28 | End: 2021-09-30 | Stop reason: HOSPADM

## 2021-09-28 RX ORDER — ONDANSETRON 2 MG/ML
4 INJECTION INTRAMUSCULAR; INTRAVENOUS EVERY 6 HOURS PRN
Status: DISCONTINUED | OUTPATIENT
Start: 2021-09-28 | End: 2021-09-28

## 2021-09-28 RX ORDER — CARBOPROST TROMETHAMINE 250 UG/ML
250 INJECTION, SOLUTION INTRAMUSCULAR
Status: DISCONTINUED | OUTPATIENT
Start: 2021-09-28 | End: 2021-09-30 | Stop reason: HOSPADM

## 2021-09-28 RX ORDER — ONDANSETRON 4 MG/1
4 TABLET, ORALLY DISINTEGRATING ORAL EVERY 6 HOURS PRN
Status: DISCONTINUED | OUTPATIENT
Start: 2021-09-28 | End: 2021-09-28

## 2021-09-28 RX ORDER — BUPIVACAINE HYDROCHLORIDE 2.5 MG/ML
INJECTION, SOLUTION EPIDURAL; INFILTRATION; INTRACAUDAL
Status: COMPLETED
Start: 2021-09-28 | End: 2021-09-28

## 2021-09-28 RX ORDER — LIDOCAINE HYDROCHLORIDE AND EPINEPHRINE 15; 5 MG/ML; UG/ML
INJECTION, SOLUTION EPIDURAL PRN
Status: DISCONTINUED | OUTPATIENT
Start: 2021-09-28 | End: 2021-09-28

## 2021-09-28 RX ORDER — MISOPROSTOL 200 UG/1
800 TABLET ORAL
Status: DISCONTINUED | OUTPATIENT
Start: 2021-09-28 | End: 2021-09-30 | Stop reason: HOSPADM

## 2021-09-28 RX ORDER — MODIFIED LANOLIN
OINTMENT (GRAM) TOPICAL
Status: DISCONTINUED | OUTPATIENT
Start: 2021-09-28 | End: 2021-09-30 | Stop reason: HOSPADM

## 2021-09-28 RX ORDER — DEXTROSE MONOHYDRATE 25 G/50ML
25-50 INJECTION, SOLUTION INTRAVENOUS
Status: DISCONTINUED | OUTPATIENT
Start: 2021-09-28 | End: 2021-09-30 | Stop reason: HOSPADM

## 2021-09-28 RX ORDER — MORPHINE SULFATE 10 MG/ML
10 INJECTION, SOLUTION INTRAMUSCULAR; INTRAVENOUS
Status: DISCONTINUED | OUTPATIENT
Start: 2021-09-28 | End: 2021-09-28

## 2021-09-28 RX ORDER — LIDOCAINE 40 MG/G
CREAM TOPICAL
Status: DISCONTINUED | OUTPATIENT
Start: 2021-09-28 | End: 2021-09-28

## 2021-09-28 RX ADMIN — Medication 340 ML/HR: at 17:12

## 2021-09-28 RX ADMIN — Medication 25 MCG: at 07:32

## 2021-09-28 RX ADMIN — LIDOCAINE HYDROCHLORIDE AND EPINEPHRINE 3 ML: 15; 5 INJECTION, SOLUTION EPIDURAL at 08:52

## 2021-09-28 RX ADMIN — BUPIVACAINE HYDROCHLORIDE 5 ML: 2.5 INJECTION, SOLUTION EPIDURAL; INFILTRATION; INTRACAUDAL at 08:57

## 2021-09-28 RX ADMIN — Medication 25 MCG: at 03:31

## 2021-09-28 RX ADMIN — Medication 2 MILLI-UNITS/MIN: at 09:54

## 2021-09-28 RX ADMIN — Medication 25 MCG: at 01:29

## 2021-09-28 RX ADMIN — FENTANYL CITRATE 100 MCG: 50 INJECTION, SOLUTION INTRAMUSCULAR; INTRAVENOUS at 08:57

## 2021-09-28 RX ADMIN — IBUPROFEN 800 MG: 800 TABLET, FILM COATED ORAL at 22:20

## 2021-09-28 RX ADMIN — BUPIVACAINE HYDROCHLORIDE 5 ML: 2.5 INJECTION, SOLUTION EPIDURAL; INFILTRATION; INTRACAUDAL at 09:02

## 2021-09-28 RX ADMIN — ONDANSETRON 4 MG: 2 INJECTION INTRAMUSCULAR; INTRAVENOUS at 10:34

## 2021-09-28 RX ADMIN — Medication: at 09:03

## 2021-09-28 RX ADMIN — SODIUM CHLORIDE, POTASSIUM CHLORIDE, SODIUM LACTATE AND CALCIUM CHLORIDE 500 ML: 600; 310; 30; 20 INJECTION, SOLUTION INTRAVENOUS at 08:58

## 2021-09-28 RX ADMIN — DOCUSATE SODIUM 100 MG: 100 CAPSULE, LIQUID FILLED ORAL at 22:20

## 2021-09-28 RX ADMIN — SODIUM CHLORIDE, POTASSIUM CHLORIDE, SODIUM LACTATE AND CALCIUM CHLORIDE: 600; 310; 30; 20 INJECTION, SOLUTION INTRAVENOUS at 08:11

## 2021-09-28 RX ADMIN — Medication 25 MCG: at 05:28

## 2021-09-28 RX ADMIN — Medication: at 15:29

## 2021-09-28 NOTE — ANESTHESIA PREPROCEDURE EVALUATION
Anesthesia Pre-Procedure Evaluation    Patient: Mary Alice Jordan   MRN: [id number] : 1985        Preoperative Diagnosis: * No surgery found *   Procedure :      Past Medical History:   Diagnosis Date     Anxiety      Chickenpox      Irritable bowel syndrome (IBS)       Past Surgical History:   Procedure Laterality Date     TONSILLECTOMY  6th grade      No Known Allergies   Social History     Tobacco Use     Smoking status: Former Smoker     Packs/day: 0.00     Smokeless tobacco: Never Used   Substance Use Topics     Alcohol use: Not Currently     Comment: occas-quit with pregnancy      Wt Readings from Last 1 Encounters:   21 123.4 kg (272 lb)        Anesthesia Evaluation   Pt has had prior anesthetic. Type: General.        ROS/MED HX  ENT/Pulmonary:  - neg pulmonary ROS     Neurologic:  - neg neurologic ROS     Cardiovascular:  - neg cardiovascular ROS     METS/Exercise Tolerance:     Hematologic:       Musculoskeletal:       GI/Hepatic: Comment: Hx Elevated LFTs      Renal/Genitourinary:       Endo:     (+) type II DM, Last HgA1c: 7.5, date: 3/2/21, Obesity,     Psychiatric/Substance Use:     (+) psychiatric history anxiety     Infectious Disease:       Malignancy:       Other:            Physical Exam    Airway        Mallampati: II   TM distance: > 3 FB   Neck ROM: full   Mouth opening: > 3 cm    Respiratory Devices and Support         Dental  no notable dental history         Cardiovascular   cardiovascular exam normal          Pulmonary   pulmonary exam normal                OUTSIDE LABS:  CBC:   Lab Results   Component Value Date    WBC 9.4 2021    WBC 10.2 2021    HGB 10.5 (L) 2021    HGB 10.6 (L) 2021    HCT 32.6 (L) 2021    HCT 32.9 (L) 2021     2021     2021     BMP:   Lab Results   Component Value Date     2021     10/08/2015    POTASSIUM 3.5 2021    POTASSIUM 3.7 10/08/2015    CHLORIDE 105 2021     CHLORIDE 106 10/08/2015    CO2 25 07/23/2021    CO2 24 10/08/2015    BUN 8 07/23/2021    BUN 12 10/08/2015    CR 0.52 09/28/2021    CR 0.46 (L) 07/23/2021     (H) 09/27/2021    GLC 76 07/23/2021     COAGS: No results found for: PTT, INR, FIBR  POC:   Lab Results   Component Value Date    HCG Negative 11/05/2014     HEPATIC:   Lab Results   Component Value Date    ALBUMIN 3.9 10/23/2015    PROTTOTAL 7.9 10/23/2015    ALT 18 09/28/2021    AST 12 09/28/2021    ALKPHOS 66 10/23/2015    BILITOTAL 0.4 10/23/2015     OTHER:   Lab Results   Component Value Date    A1C 7.5 (H) 03/02/2021    LAZARO 9.5 07/23/2021    LIPASE 111 10/23/2015    TSH 0.74 10/08/2015    T4 1.03 11/05/2014       Anesthesia Plan    ASA Status:  2      Anesthesia Type: Epidural.              Consents    Anesthesia Plan(s) and associated risks, benefits, and realistic alternatives discussed. Questions answered and patient/representative(s) expressed understanding.     - Discussed with:  Patient         Postoperative Care            Comments:           neg OB ROS.       JAMESON Can CRNA

## 2021-09-28 NOTE — PLAN OF CARE
Assumed care at 0700.     Patient breathing through contractions and was rating them a 7/10. Dr. Street performed AROM at 0736. Large amount of clear fluid noted. SVE 2-3/70/-2. Yang score of 8 and verbal order from MD to start pit.      Patient reported her contractions were becoming more intense and epidural was placed and dosed at 0903. Patient denied contraction pain shortly after and was able to rest in bed. Frequent vital signs stable.       BG checks completed. Highest BG was 136, however patient had just ate her breakfast an hour prior to that check. BG rechecked again later and was 77. Patient declined afternoon NPH insulin dose due to BG being 77. Patient had an emesis x1; PRN IV zofran was given.      Fetal spiral electrode placed on at 0853 due to difficulty tracing baby's HR with the external monitor. Variable decels noted; patient repositioned and was straight cathed with an output of 75 mL. SVE at that time was 3-4/70/-2.    Pit is running at 8 milliunits/hr. IV fluids running.     Will continue to monitor.

## 2021-09-28 NOTE — PROGRESS NOTES
Assumed care for this pt this AM, after admission last PM for cervical ripening and IOL due to significantly decreasing insulin needs with DM2 (possible impending placental insufficiency) as well as recurring episodes of hypoglycemia;  She had recent growth ultrasound  with EFW 3090gm (78%); she received Misoprostol Q 2 hours over night.  She has been on low dose ASA  GBS negative    /67   Pulse 68   Temp 98.5  F (36.9  C) (Oral)   Resp 16   LMP  (LMP Unknown)   SpO2 96%     VE: cervix 2+/70/-2  AROM clear fluid returned; Vertex  EFM tier 1  EFW AGA    A: 37w1d  IUP  Maternal DM2--decreasing insulin requirement  Obesity    P; start pitocin IOL with anticipation of   Monitor BS in labor, with IV insulin prn per protocal  Kaity Street MD  [facility]

## 2021-09-28 NOTE — PLAN OF CARE
Pt having increasing pain with contractions, unable to score a rating. Pt needs to breath through contractions, but is still able to focus on external stimuli during. FHT noted to be cat 1. At time, unable to assess d/t maternal movement and habitus. All questions answered at this time regarding process of induction.     Report given to CHRIS Serrano, questions answered.

## 2021-09-28 NOTE — ANESTHESIA PROCEDURE NOTES
Epidural catheter Procedure Note    Pre-Procedure   Staff -        CRNA: Sadiq Christensen APRN CRNA       Performed By: CRNA       Location: OB       Procedure Start/Stop Times: 9/28/2021 8:19 AM and 9/28/2021 8:49 AM       Pre-Anesthestic Checklist: patient identified, IV checked, risks and benefits discussed, informed consent, monitors and equipment checked, pre-op evaluation, at physician/surgeon's request and post-op pain management  Timeout:       Correct Patient: Yes        Correct Procedure: Yes        Correct Site: Yes        Correct Position: Yes   Procedure Documentation  Procedure: epidural catheter       Diagnosis: Active labor       Patient Position: sitting       Patient Prep/Sterile Barriers: sterile gloves, mask, patient draped       Skin prep: DuraPrep       Local skin infiltrated with 10 mL of 1% lidocaine.        Insertion Site: L2-3. (midline approach).       Technique: LORT saline        LEOBARDO at 8 cm.       Needle Type: Touhy needle       Needle Gauge: 17.        Needle Length (Inches): 3.5        Catheter: 19 G.         Catheter threaded easily.         4 cm epidural space.         Threaded 12 cm at skin.         # of attempts: 3 and  # of redirects:  2    Assessment/Narrative         Paresthesias: No.       Test dose of 3 mL lidocaine 1.5% w/ 1:200,000 epinephrine at 08:52 CDT.         Test dose negative, 3 minutes after injection, for signs of intravascular, subdural, or intrathecal injection.       Insertion/Infusion Method: LORT saline       Aspiration negative for Heme or CSF via Epidural Catheter.     Comments:  VAS pain score prior to epidural:  6    VAS pain score after epidural:  2    Pt. Tolerated well, FHR stable.

## 2021-09-28 NOTE — H&P
[facility] Labor and Delivery H&P  2021  Mary Alice Jordan  [id number]      HPI: Mary Alice Jordan is a 36 year old  at 37w0d here for IOL in the setting of DM2 with decreasing insulin needs. She states that she is feeling well today.  + FM, no ctx, VB, or LOF.  She denies fever, HA, scotoma, nausea, vomiting, CP, SOB, RUQ pain, constipation, diarrhea, and acute swelling.        Pregnancy notable for:  --DM2 with decreasing insulin needs  --morbid obesity    OBHX:   OB History    Para Term  AB Living   2 1 1 0 0 1   SAB TAB Ectopic Multiple Live Births   0 0 0 0 1      # Outcome Date GA Lbr Chris/2nd Weight Sex Delivery Anes PTL Lv   2 Current            1 Term 07 42w0d  3.317 kg (7 lb 5 oz) M  EPI  LAURYN      Name: Teagen       MedicalHX:   Past Medical History:   Diagnosis Date     Anxiety      Chickenpox      Irritable bowel syndrome (IBS)        SurgicalHX:   Past Surgical History:   Procedure Laterality Date     TONSILLECTOMY  6th grade       Medications:   No current facility-administered medications on file prior to encounter.  acetone urine (KETOSTIX) test strip, Test first urine of every morning for 1 week. If consistently negative, test once weekly.  blood glucose (NO BRAND SPECIFIED) lancets standard, Use to test blood sugar 4 times daily or as directed.  blood glucose (NO BRAND SPECIFIED) test strip, Use to test blood sugar 4 times daily or as directed.  blood glucose monitoring (NO BRAND SPECIFIED) meter device kit, Use to test blood sugar 4 times daily or as directed.  Continuous Blood Gluc  (FREESTYLE RADHA 14 DAY READER) PHIL, Use to read blood sugars as per 's instructions.  Continuous Blood Gluc Sensor (FREESTYLE RADHA 14 DAY SENSOR) MIS, Change every 14 days.  escitalopram (LEXAPRO) 20 MG tablet, Take 1 tablet (20 mg) by mouth daily  insulin pen needle (32G X 4 MM) 32G X 4 MM miscellaneous, Use 1 pen needles daily or as  directed.  loperamide (IMODIUM) 2 MG capsule, Take 2 mg by mouth 4 times daily as needed for diarrhea   Prenatal Vit-Fe Fumarate-FA (PRENATAL VITAMIN PO), Take 1 tablet by mouth every evening         Allergies:  No Known Allergies    FamilyHX:  Family History   Problem Relation Age of Onset     Heart Disease Father         MI     Diabetes Father      Cancer Maternal Grandfather        SocialHX:   Social History     Socioeconomic History     Marital status: Single     Spouse name: Not on file     Number of children: Not on file     Years of education: Not on file     Highest education level: Not on file   Occupational History     Not on file   Tobacco Use     Smoking status: Former Smoker     Packs/day: 0.00     Smokeless tobacco: Never Used   Vaping Use     Vaping Use: Never used   Substance and Sexual Activity     Alcohol use: Not Currently     Comment: occas-quit with pregnancy     Drug use: No     Sexual activity: Yes     Partners: Male     Comment:  since 2014 (together since 2005)   Other Topics Concern     Parent/sibling w/ CABG, MI or angioplasty before 65F 55M? Yes     Comment: father   Social History Narrative     Not on file     Social Determinants of Health     Financial Resource Strain:      Difficulty of Paying Living Expenses:    Food Insecurity:      Worried About Running Out of Food in the Last Year:      Ran Out of Food in the Last Year:    Transportation Needs:      Lack of Transportation (Medical):      Lack of Transportation (Non-Medical):    Physical Activity:      Days of Exercise per Week:      Minutes of Exercise per Session:    Stress:      Feeling of Stress :    Social Connections:      Frequency of Communication with Friends and Family:      Frequency of Social Gatherings with Friends and Family:      Attends Yazidi Services:      Active Member of Clubs or Organizations:      Attends Club or Organization Meetings:      Marital Status:    Intimate Partner Violence:      Fear of  Current or Ex-Partner:      Emotionally Abused:      Physically Abused:      Sexually Abused:        ROS: 10-point ROS negative except as in HPI     Physical Exam:  Vitals:    21 2100   BP: (!) 141/84   Temp: 99.1  F (37.3  C)   TempSrc: Oral     GEN: resting comfortably in bed, NAD   CV: RRR, no murmurs  PULM: CTAB, no increased work of breathing, no cough/wheeze   ABD: soft, gravid, non-tender, non-distended  EXT: trace edema, non tender to palpation  CVX: 1-2 per RN    NST:  FHT: baseline 130s, moderate variability, + accels, no decels  TOCO: quiet    Labs:   CBC, RPR, T&S, COVID, HELLP labs  Lab Results   Component Value Date    ABO AB 2021    RH Pos 2021    AS Neg 2021    HEPBANG Nonreactive 2021    CHPCRT Negative 2021    GCPCRT Negative 2021    HGB 10.6 (L) 2021       GBS Status:   No results found for: GBS    Lab Results   Component Value Date    PAP NIL 2021       A/P: Mary Alice Jordan is a 36 year old female  at 37w0d here for IOL. SVE borderline for favorable. Plan to start with cervical ripening and switch to pitocin with AROM when able.    Admit to L&D. Place PIV. Draw labs: T&S, CBC, RPR and COVID  BP mild range- no other signs/symptoms of preE, plan HELLP labs in AM, sooner if continued elevated BPs  DM2- plan to continue home insulin with cervical ripening and switch to insulin gtt in active labor  Labor: Anticipate   FWB: Category 1FHT.  Continue EFM and toco  Pain: Understands options  PNC: Rh +, Rubella imm, GBS neg,     Nicole Spear MD   OB/GYN   2021 12:43 AM

## 2021-09-28 NOTE — PROGRESS NOTES
Patient SVE 10/100/-1. Patient reports pressure in her bottom like she has to poop.     Patient continues to report vaginal pain and has been using her PCA button. Patient stated she is coping.     Dr. Street notified.

## 2021-09-28 NOTE — PROGRESS NOTES
Data: Patient admitted to room  at 1900. Patient is a . Prenatal record reviewed.   OB History    Para Term  AB Living   2 1 1 0 0 1   SAB TAB Ectopic Multiple Live Births   0 0 0 0 1      # Outcome Date GA Lbr Chris/2nd Weight Sex Delivery Anes PTL Lv   2 Current            1 Term 07 42w0d  3.317 kg (7 lb 5 oz) M  EPI  LAURYN      Name: Melyssa   .  Medical History:   Past Medical History:   Diagnosis Date     Anxiety      Chickenpox      Irritable bowel syndrome (IBS)    .  Gestational age 37w1d. Vital signs per doc flowsheet. Fetal movement present. Patient reports No chief complaint on file.   as reason for admission. Support person, Tino, is present.  Action: RN obtained at 0. Care of patient assumed at 1900. Verbal consent for EFM, external fetal monitors applied. Admission assessment completed. Patient and support persons educated on labor/ induction process. Patient instructed to report change in fetal movement, contractions, vaginal leaking of fluid or bleeding, abdominal pain, or any concerns related to the pregnancy to her nurse/physician. Patient oriented to room, call light in reach.   Response: Dr. Spear informed of arrival. Plan per provider is begin induction, EFM, monitor. Patient verbalized understanding of education and verbalized agreement with plan. Patient coping with labor via rest.

## 2021-09-28 NOTE — L&D DELIVERY NOTE
"Delivery Summary    Mary Alice Jordan MRN# [id number]   Age: 36 year old YOB: 1985     ASSESSMENT & PLAN: 37 y/o  admitted at 37wks for cervical ripening and IOL due to type 2 diabetes with significantly changing insulin requirements (decreased, hypoglycemia); she received approx 12 hours of po Misoprostol, followed by AROM and pitocin; epidural for labor analgesia; sugars were monitored per IV protocal.  She had normal stage 1 progress, pushed effecitvely to slow crown with  liveborn male, over intact perineum  Placenta spontaneous, appeared intact  QBL 150cc  \"Deklan\"  Kaity Street MD  [facility]         Julian, Male-Mary Alice [[id number]]    Labor Event Times    Labor onset date: 21 Onset time:  7:36 AM   Dilation complete date: 21 Complete time:  4:51 PM   Start pushing date/time: 2021 1705      Labor Events    Labor Type: Induction/Cervical ripening, AROM  Predominate monitoring during 1st stage: continuous electronic fetal monitoring     Antibiotics received during labor?: No     Rupture date/time: 21 0736   Fluid color: Clear     Induction: Misoprostol  Induction date/time:     Cervical ripening date/time: 210      Augmentation: Oxytocin, AROM  Indications for augmentation: Ineffective Contraction Pattern  1:1 continuous labor support provided by?: RN       Delivery/Placenta Date and Time    Delivery Date: 21 Delivery Time:  5:11 PM   Placenta Date/Time: 2021  5:16 PM  Delivering clinician: Kaity Street MD   Other personnel present at delivery:  Provider Role   Beth Blankenship, RN Delivery Nurse   Jm Christensen, RN Charge Nurse   Kaity Street MD Obstetrician         Vaginal Counts     Initial count performed by 2 team members:  Two Team Members   jm Street       Needles Suture Needles Sponges (RETIRED) Instruments   Initial counts 2  5    Added to count       Relief counts     "   Final counts             Placed during labor Accounted for at the end of labor   FSE     IUPC     Cervadil                       Apgars    Living status: Living   1 Minute 5 Minute 10 Minute 15 Minute 20 Minute   Skin color: 1  1       Heart rate: 2  2       Reflex irritability: 2  2       Muscle tone: 2  2       Respiratory effort: 1  2       Total: 8  9       Apgars assigned by: GIRISH LIMA     Cord    Cord Complications: None               Cord Blood Disposition: Lab    Gases Sent?: No    Delayed cord clamping?: Yes    Stem cell collection?: No       Labor Events and Shoulder Dystocia    Fetal Tracing Prior to Delivery: Category 1     Delivery (Maternal) (Provider to Complete) ([id number])    Episiotomy: None  Perineal lacerations: None    Repair suture: None  Genital tract inspection done: Pos     Blood Loss  Mother: Mary Alice Jordan #[id number]   Start of Mother's Information    Delivery Blood Loss  09/28/21 0736 - 09/28/21 1725    None           End of Mother's Information  Mother: Mary Alice Jordan #[id number]          Delivery - Provider to Complete ([id number])    Delivering clinician: Kaity Street MD  Attempted Delivery Types (Choose all that apply): Spontaneous Vaginal Delivery  Delivery Type (Choose the 1 that will go to the Birth History): Vaginal, Spontaneous                   Other personnel:  Provider Role   Girish Blankenship RN Delivery Nurse   Elsa Christensen RN Charge Nurse   Kaity Street MD Obstetrician                Placenta    Date/Time: 9/28/2021  5:16 PM  Removal: Spontaneous  Disposition: Hospital disposal           Anesthesia    Method: Epidural  Cervical dilation at placement: 4-7                Presentation and Position    Presentation: Vertex    Position: Left Occiput Anterior                 Kaity Street MD

## 2021-09-28 NOTE — PROGRESS NOTES
Patient reporting vaginal pain and having intermittent shakes. SVE 7/90/-2. Patient positioned on her right and left side, but denied any change in her pain. Patient has been using her PCA button.     BG check was 108.     Continuous EFM. Variable decels and a late decel noted. IV fluid bolus given and patient repositioned.     Pit currently infusing at 10 milliunits/hr.     Will continue to monitor.

## 2021-09-28 NOTE — PLAN OF CARE
S:Delivery  B:Induced  Labor,37w1d    No results found for: GBS with antibiotic treatment not indicated 4 hours prior to delivery.  A: Patient delivered   none, intact at 1711 with Dr. DELVIN Street in attendance and baby placed on mother's abdomen for delayed cord clamping. Baby dried and stimulated. Baby placed  skin to skin @ 1711. Apgars 8/9. Delivery .  IV infusion of Oxytocin  infused. Placenta removal spontaneous. MD does not want placenta sent to pathology.  See Flowsheet for VS and PP checks. Labor care plan goals met, transition now to postpartum care.  R: Expect routine postpartum care. Anticipate first feeding within the hour or whenever infant displays feeding cues. Continue skin to skin. Prior discussion with mother indicates that feeding plan is Breast feeding . Educated mother on importance of exclusive breastfeeding, expected feeding readiness cues and encouraged her to observe for these cues while rooming in. Informed her that breastfeeding assistance would be provided.

## 2021-09-29 PROBLEM — D50.8 IRON DEFICIENCY ANEMIA SECONDARY TO INADEQUATE DIETARY IRON INTAKE: Status: ACTIVE | Noted: 2021-09-29

## 2021-09-29 LAB
GLUCOSE BLDC GLUCOMTR-MCNC: 113 MG/DL (ref 70–99)
HGB BLD-MCNC: 10.3 G/DL (ref 11.7–15.7)
HOLD SPECIMEN: NORMAL

## 2021-09-29 PROCEDURE — 250N000013 HC RX MED GY IP 250 OP 250 PS 637: Performed by: OBSTETRICS & GYNECOLOGY

## 2021-09-29 PROCEDURE — 120N000001 HC R&B MED SURG/OB

## 2021-09-29 PROCEDURE — 85018 HEMOGLOBIN: CPT | Performed by: OBSTETRICS & GYNECOLOGY

## 2021-09-29 PROCEDURE — 36415 COLL VENOUS BLD VENIPUNCTURE: CPT | Performed by: OBSTETRICS & GYNECOLOGY

## 2021-09-29 RX ADMIN — ACETAMINOPHEN 650 MG: 325 TABLET, FILM COATED ORAL at 21:21

## 2021-09-29 RX ADMIN — IBUPROFEN 800 MG: 800 TABLET, FILM COATED ORAL at 05:16

## 2021-09-29 RX ADMIN — DOCUSATE SODIUM 100 MG: 100 CAPSULE, LIQUID FILLED ORAL at 07:37

## 2021-09-29 RX ADMIN — ACETAMINOPHEN 650 MG: 325 TABLET, FILM COATED ORAL at 15:04

## 2021-09-29 RX ADMIN — IBUPROFEN 800 MG: 800 TABLET, FILM COATED ORAL at 21:21

## 2021-09-29 RX ADMIN — IBUPROFEN 800 MG: 800 TABLET, FILM COATED ORAL at 15:04

## 2021-09-29 NOTE — PROGRESS NOTES
[facility] Obstetrics Post-Partum Progress Note          Assessment and Plan:    Assessment:   Post-partum day #1  Induced vaginal delivery secondary to GDM  L&D complications: Type 2 Diabetic  Intrauterine pregnancy at 37+1 weeks gestation      Doing well.  No excessive bleeding  Pain well-controlled.      Plan:   Ambulation encouraged  Breast feeding strategies discussed  BS - fasting in the am           Interval History:   Doing well.  Pain is well-controlled.  No fevers.  No history of foul-smelling vaginal discharge.  Good appetite.  Denies chest pain, shortness of breath, nausea or vomiting.  Vaginal bleeding is similar to a heavy menstrual flow.  Ambulatory.  Breastfeeding well.          Significant Problems:    None          Review of Systems:    The patient denies any chest pain, shortness of breath, excessive pain, fever, chills, purulent drainage from the wound, nausea or vomiting.          Medications:   All medications related to the patient's surgery have been reviewed          Physical Exam:   All vitals stable  Temp: 97.5  F (36.4  C) Temp src: Oral BP: 129/54 Pulse: 68   Resp: 16 SpO2: 97 % O2 Device: None (Room air)    Uterine fundus is firm, non-tender and at the level of the umbilicus          Data:     All laboratory data related to this surgery reviewed  Hemoglobin   Date Value Ref Range Status   09/29/2021 10.3 (L) 11.7 - 15.7 g/dL Final   09/28/2021 10.5 (L) 11.7 - 15.7 g/dL Final   09/27/2021 10.6 (L) 11.7 - 15.7 g/dL Final   06/24/2021 10.8 (L) 11.7 - 15.7 g/dL Final   03/02/2021 12.5 11.7 - 15.7 g/dL Final   11/05/2014 12.7 11.7 - 15.7 g/dL Final   09/20/2014 12.2 11.7 - 15.7 g/dL Final   08/14/2014 12.5 11.7 - 15.7 g/dL Final         Nile Jones MD

## 2021-09-29 NOTE — PROGRESS NOTES
Up to BR for pericare. Pt was able to void. Mother and baby transferred to postpartum unit at 2200 via ambulatory and bassinet after completion of immediate recovery period. Patient oriented to room and instructed to call for assistance when up to the bathroom the next time. RN resumes patient care. Mother and baby bonding well and in stable condition upon transfer.

## 2021-09-29 NOTE — PLAN OF CARE
Patient is ambulating and voiding independently. Vitals and assessments have been WDL. Fundus is firm with minimal bleeding. Do not need to obtain anymore blood sugars per Dr. Jones. Patient is breastfeeding and was taught hand expression, positioning, feeding cues, and signs of a good latch. Patient is getting good amounts of colostrum with hand expression. Patient is loving and attentive towards infant and independent with cares. Plan to discharge tomorrow.

## 2021-09-29 NOTE — PLAN OF CARE
Data: Vital signs within normal limits. Postpartum checks within normal limits - see flow record. Patient  is tolerating po intake. Patient is able to empty bladder independently. . Patient ambulating independently..   No apparent signs of infection. perineum healing well. Patient is performing self cares and is able to care for infant. Positive attachment behaviors are observed with infant. Support persons are present.  Action:  Pain plan was discussed. Patient will request pain med when she is ready for it. Patient was medicated during the shift for cramping. See MAR.Patient education done about breastfeeding,  cares, postpartum cares, pain management/plan,  safety, and rest. See flow record.  Response:   Patient reassessed within 1 hour after each medication for pain. Patient stated that pain had improved. Patient stated that she was comfortable. .   Plan: Anticipate discharge on .

## 2021-09-30 VITALS
TEMPERATURE: 98.3 F | RESPIRATION RATE: 16 BRPM | SYSTOLIC BLOOD PRESSURE: 138 MMHG | DIASTOLIC BLOOD PRESSURE: 60 MMHG | OXYGEN SATURATION: 97 % | HEART RATE: 75 BPM

## 2021-09-30 LAB — GLUCOSE BLDC GLUCOMTR-MCNC: 81 MG/DL (ref 70–99)

## 2021-09-30 PROCEDURE — 250N000013 HC RX MED GY IP 250 OP 250 PS 637: Performed by: OBSTETRICS & GYNECOLOGY

## 2021-09-30 RX ADMIN — ACETAMINOPHEN 650 MG: 325 TABLET, FILM COATED ORAL at 07:22

## 2021-09-30 RX ADMIN — DOCUSATE SODIUM 100 MG: 100 CAPSULE, LIQUID FILLED ORAL at 07:22

## 2021-09-30 RX ADMIN — IBUPROFEN 800 MG: 800 TABLET, FILM COATED ORAL at 07:22

## 2021-09-30 NOTE — PLAN OF CARE
Patient discharged per ambulatory with infant in car seat. Mother verified that her band matches her infant's band by comparing the infant's  MR#.  Discharge instructions given. Encouraged to call for any problems, questions or concerns. RXs 0.

## 2021-09-30 NOTE — DISCHARGE SUMMARY
[facility] Discharge Summary    Mary Alice Jordan MRN# [id number]   Age: 36 year old YOB: 1985     Date of Admission:  9/27/2021  Date of Discharge::  9/30/2021  Admitting Physician:  Nicole Spear MD  Discharge Physician:  Nadia Boone MD     Home clinic: [facility]          Admission Diagnoses:   Intrauterine pregnancy at 37w1d weeks gestation  Type 2 DM  Pre-existing anemia in pregnancy: asymptomatic          Discharge Diagnosis:   Normal spontaneous vaginal delivery  Intrauterine pregnancy at 37w1d weeks gestation  Type 2 DM  Pre-existing anemia in pregnancy: asymptomatic          Procedures:   Procedure(s): Induction of labor       No other procedures performed during this admission           Medications Prior to Admission:     Medications Prior to Admission   Medication Sig Dispense Refill Last Dose     acetone urine (KETOSTIX) test strip Test first urine of every morning for 1 week. If consistently negative, test once weekly. 50 strip 1 Past Week at Monday     aspirin 81 MG EC tablet Take 81 mg by mouth 2 times daily    9/27/2021 at am     blood glucose (NO BRAND SPECIFIED) lancets standard Use to test blood sugar 4 times daily or as directed. 100 each 3 9/27/2021 at am     blood glucose (NO BRAND SPECIFIED) test strip Use to test blood sugar 4 times daily or as directed. 150 strip 11 9/27/2021 at after lunch.     blood glucose monitoring (NO BRAND SPECIFIED) meter device kit Use to test blood sugar 4 times daily or as directed. 1 kit 0 9/27/2021 at Unknown time     Continuous Blood Gluc  (FREESTYLE RADHA 14 DAY READER) PHIL Use to read blood sugars as per 's instructions. 1 each 0 Unknown at Unknown time     Continuous Blood Gluc Sensor (FREESTYLE RADHA 14 DAY SENSOR) MISC Change every 14 days. 2 each 11 Unknown at Unknown time     escitalopram (LEXAPRO) 20 MG tablet Take 1 tablet (20 mg) by mouth daily 30 tablet 0  More than a month at not since she found out she was pregnant     insulin pen needle (32G X 4 MM) 32G X 4 MM miscellaneous Use 1 pen needles daily or as directed. 100 each 3 9/27/2021 at Unknown time     Prenatal Vit-Fe Fumarate-FA (PRENATAL VITAMIN PO) Take 1 tablet by mouth every evening    9/26/2021 at pm     Misc. Devices (BREAST PUMP) MISC 1 each daily as needed (Breastfeeding) Electric breast pump 1 each 0 not yet     [DISCONTINUED] insulin NPH (NOVOLIN N FLEXPEN) 100 UNIT/ML injection 9 units before lunch and 63 units at bedtime + 2 unit prime = 76 units TDD. May substitute with Humulin N (Kwik pen) if insurance prefers. (Patient taking differently: 9 units before lunch and 60 units at bedtime + 2 unit prime = 76 units TDD. May substitute with Humulin N (Kwik pen) if insurance prefers.) 30 mL 2 9/27/2021 at 1230     [DISCONTINUED] loperamide (IMODIUM) 2 MG capsule Take 2 mg by mouth 4 times daily as needed for diarrhea    More than a month at Unknown time             Discharge Medications:     Current Discharge Medication List      CONTINUE these medications which have NOT CHANGED    Details   acetone urine (KETOSTIX) test strip Test first urine of every morning for 1 week. If consistently negative, test once weekly.  Qty: 50 strip, Refills: 1    Associated Diagnoses: Type 2 diabetes mellitus without complication, unspecified whether long term insulin use (H)      aspirin 81 MG EC tablet Take 81 mg by mouth 2 times daily       blood glucose (NO BRAND SPECIFIED) lancets standard Use to test blood sugar 4 times daily or as directed.  Qty: 100 each, Refills: 3    Associated Diagnoses: Type 2 diabetes mellitus without complication, without long-term current use of insulin (H)      blood glucose (NO BRAND SPECIFIED) test strip Use to test blood sugar 4 times daily or as directed.  Qty: 150 strip, Refills: 11    Associated Diagnoses: Type 2 diabetes mellitus without complication, without long-term current use of  insulin (H)      blood glucose monitoring (NO BRAND SPECIFIED) meter device kit Use to test blood sugar 4 times daily or as directed.  Qty: 1 kit, Refills: 0    Associated Diagnoses: Type 2 diabetes mellitus without complication, without long-term current use of insulin (H)      Continuous Blood Gluc  (FREESTYLE RADHA 14 DAY READER) PHIL Use to read blood sugars as per 's instructions.  Qty: 1 each, Refills: 0    Associated Diagnoses: Type 2 diabetes mellitus without complication, unspecified whether long term insulin use (H)      Continuous Blood Gluc Sensor (FREESTYLE RADHA 14 DAY SENSOR) MISC Change every 14 days.  Qty: 2 each, Refills: 11    Associated Diagnoses: Type 2 diabetes mellitus without complication, unspecified whether long term insulin use (H)      escitalopram (LEXAPRO) 20 MG tablet Take 1 tablet (20 mg) by mouth daily  Qty: 30 tablet, Refills: 0    Associated Diagnoses: Anxiety      insulin pen needle (32G X 4 MM) 32G X 4 MM miscellaneous Use 1 pen needles daily or as directed.  Qty: 100 each, Refills: 3    Associated Diagnoses: Type 2 diabetes mellitus without complication, unspecified whether long term insulin use (H)      Prenatal Vit-Fe Fumarate-FA (PRENATAL VITAMIN PO) Take 1 tablet by mouth every evening       Misc. Devices (BREAST PUMP) MISC 1 each daily as needed (Breastfeeding) Electric breast pump  Qty: 1 each, Refills: 0    Associated Diagnoses: Prenatal care, subsequent pregnancy in third trimester         STOP taking these medications       insulin NPH (NOVOLIN N FLEXPEN) 100 UNIT/ML injection Comments:   Reason for Stopping:         loperamide (IMODIUM) 2 MG capsule Comments:   Reason for Stopping:                     Consultations:   No consultations were requested during this admission          Brief History of Labor:   37 y/o  admitted at 37wks for cervical ripening and IOL due to type 2 diabetes with significantly changing insulin requirements (decreased,  "hypoglycemia); she received approx 12 hours of po Misoprostol, followed by AROM and pitocin; epidural for labor analgesia; sugars were monitored per IV protocal.  She had normal stage 1 progress, pushed effecitvely to slow crown with  liveborn male, over intact perineum  Placenta spontaneous, appeared intact  QBL 150cc  \"Deklan\"           Hospital Course:   The patient's hospital course was unremarkable.  On discharge, her pain was well controlled. Vaginal bleeding is similar to peak menstrual flow.  Voiding without difficulty.  She was discharged on post-partum day #2.  Her blood sugars were normal postpartum, so insulin held    PE: /72   Pulse 74   Temp 98  F (36.7  C) (Oral)   Resp 16   LMP  (LMP Unknown)   SpO2 97%   Breastfeeding Unknown    NAD  ABd: soft, nontender, fundus firm  Ext: calves nontender    Post-partum hemoglobin:   Hemoglobin   Date Value Ref Range Status   2021 10.3 (L) 11.7 - 15.7 g/dL Final   2021 10.8 (L) 11.7 - 15.7 g/dL Final             Discharge Instructions and Follow-Up:   Discharge diet: Regular   Discharge activity: Activity as tolerated   Discharge follow-up: Follow up with OB clinic in 6 weeks  Check fasting and postprandial sugars and send results to Diabetic educators   Wound care: Drink plenty of fluids  Ice to area for comfort           Discharge Disposition:   Discharged to home      Attestation:  I have reviewed today's vital signs, notes, medications, labs and imaging.    Nadia Boone MD     "

## 2021-09-30 NOTE — PLAN OF CARE
Data: Vital signs within normal limits. Postpartum checks within normal limits - see flow record. Patient  Is tolerating po intake. Patient is able to empty bladder independently. . Patient ambulating independently..   No apparent signs of infection. perineum healing well. Patient Is performing self cares and Is able to care for infant. Positive attachment behaviors are observed with infant. Support persons are present.  Action:  Pain plan was discussed. Patient will request pain med when she is ready for it. Patient was medicated during the shift for cramping. See MAR.Patient education done about pumping & FF. See flow record.  Response:   Patient reassessed within 1 hour after each medication for pain. Patient stated that pain had improved. Patient stated that she was comfortable. .   Plan: Anticipate discharge on 9/30.

## 2021-09-30 NOTE — DISCHARGE INSTRUCTIONS
Discharge Instructions and Follow-Up:    Discharge diet: Regular   Discharge activity: Activity as tolerated   Discharge follow-up: Follow up with OB clinic in 6 weeks  Check fasting and postprandial sugars and send results to Diabetic educators   Wound care: Drink plenty of fluids  Ice to area for comfort     Postpartum Vaginal Delivery Instructions    Activity       Ask family and friends for help when you need it.    Do not place anything in your vagina for 6 weeks.    You are not restricted on other activities, but take it easy for a few weeks to allow your body to recover from delivery.  You are able to do any activities you feel up to that point.    No driving until you have stopped taking your pain medications (usually two weeks after delivery).     Call your health care provider if you have any of these symptoms:       Increased pain, swelling, redness, or fluid around your stiches from an episiotomy or perineal tear.    A fever above 100.4 F (38 C) with or without chills when placing a thermometer under your tongue.    You soak a sanitary pad with blood within 1 hour, or you see blood clots larger than a golf ball.    Bleeding that lasts more than 6 weeks.    Vaginal discharge that smells bad.    Severe pain, cramping or tenderness in your lower belly area.    A need to urinate more frequently (use the toilet more often), more urgently (use the toilet very quickly), or it burns when you urinate.    Nausea and vomiting.    Redness, swelling or pain around a vein in your leg.    Problems breastfeeding or a red or painful area on your breast.    Chest pain and cough or are gasping for air.    Problems coping with sadness, anxiety, or depression.  If you have any concerns about hurting yourself or the baby, call your provider immediately.     You have questions or concerns after you return home.     Keep your hands clean:  Always wash your hands before touching your perineal area and stitches.   This helps reduce your risk of infection.  If your hands aren't dirty, you may use an alcohol hand-rub to clean your hands. Keep your nails clean and short.

## 2021-09-30 NOTE — PLAN OF CARE
Data: Vital signs within normal limits. Postpartum checks within normal limits - see flow record. Patient eating and drinking normally. Patient able to empty bladder independently and is up ambulating. No apparent signs of infection.  Perineum  healing well. Patient performing self cares and is able to care for infant.  Action: Patient medicated during the shift for pain and cramping. See MAR. Patient reassessed within 1 hour after each medication and pain was improved - patient stated she was comfortable. Patient education done about feeding frequency, 24 hour blood sugar, latching. See flow record.  Response: Positive attachment behaviors observed with infant. Support persons 1 present.   Plan: Anticipate discharge on 9/30/21.

## 2021-10-01 ENCOUNTER — PATIENT OUTREACH (OUTPATIENT)
Dept: CARE COORDINATION | Facility: CLINIC | Age: 36
End: 2021-10-01

## 2021-10-01 DIAGNOSIS — Z71.89 OTHER SPECIFIED COUNSELING: ICD-10-CM

## 2021-10-01 NOTE — PROGRESS NOTES
Clinic Care Coordination Contact  [facility]/Voicemail       Clinical Data: Care Coordinator Outreach  Outreach attempted x 1.  Left message on patient's voicemail with call back information and requested return call.  Plan: Care Coordinator will try to reach patient again in 1-2 business days.    .Idalia PASCAL Community Health Worker  Clinic Care Coordination  [facility]  Phone: [phone]

## 2021-10-02 NOTE — PROGRESS NOTES
Clinic Care Coordination Contact    Background: Care Coordination referral placed from [facility] discharge report for reason of patient meeting criteria for a TCM outreach call by [facility] team.    Assessment: Upon chart review, [facility] Team member will cancel/close the referral for TCM outreach due to reason below:     Patient has presented to Emergency Department or has been readmitted to hospital.    Plan: Care Coordination referral for TCM outreach canceled.    Renata Loredo  Community Health Worker  [facility]  [phone]

## 2021-10-07 ENCOUNTER — NURSE TRIAGE (OUTPATIENT)
Dept: NURSING | Facility: CLINIC | Age: 36
End: 2021-10-07

## 2021-10-07 ENCOUNTER — HOSPITAL ENCOUNTER (EMERGENCY)
Facility: CLINIC | Age: 36
Discharge: HOME | End: 2021-10-08
Attending: EMERGENCY MEDICINE | Admitting: EMERGENCY MEDICINE
Payer: COMMERCIAL

## 2021-10-07 DIAGNOSIS — N39.0 ACUTE LOWER UTI: ICD-10-CM

## 2021-10-07 DIAGNOSIS — R60.0 BILATERAL LOWER EXTREMITY EDEMA: ICD-10-CM

## 2021-10-07 DIAGNOSIS — I10 BENIGN ESSENTIAL HYPERTENSION: ICD-10-CM

## 2021-10-07 PROCEDURE — 99284 EMERGENCY DEPT VISIT MOD MDM: CPT | Performed by: EMERGENCY MEDICINE

## 2021-10-07 PROCEDURE — 99284 EMERGENCY DEPT VISIT MOD MDM: CPT | Mod: 25 | Performed by: EMERGENCY MEDICINE

## 2021-10-07 ASSESSMENT — MIFFLIN-ST. JEOR: SCORE: 1864.77

## 2021-10-08 ENCOUNTER — PATIENT OUTREACH (OUTPATIENT)
Dept: CARE COORDINATION | Facility: CLINIC | Age: 36
End: 2021-10-08

## 2021-10-08 ENCOUNTER — OFFICE VISIT (OUTPATIENT)
Dept: OBGYN | Facility: CLINIC | Age: 36
End: 2021-10-08
Payer: COMMERCIAL

## 2021-10-08 ENCOUNTER — APPOINTMENT (OUTPATIENT)
Dept: ULTRASOUND IMAGING | Facility: CLINIC | Age: 36
End: 2021-10-08
Attending: EMERGENCY MEDICINE
Payer: COMMERCIAL

## 2021-10-08 VITALS
HEIGHT: 65 IN | SYSTOLIC BLOOD PRESSURE: 148 MMHG | WEIGHT: 261.8 LBS | DIASTOLIC BLOOD PRESSURE: 83 MMHG | HEART RATE: 74 BPM | TEMPERATURE: 98.1 F | BODY MASS INDEX: 43.62 KG/M2 | RESPIRATION RATE: 12 BRPM

## 2021-10-08 VITALS
TEMPERATURE: 97.9 F | SYSTOLIC BLOOD PRESSURE: 149 MMHG | HEIGHT: 65 IN | RESPIRATION RATE: 18 BRPM | HEART RATE: 57 BPM | WEIGHT: 261 LBS | OXYGEN SATURATION: 94 % | DIASTOLIC BLOOD PRESSURE: 84 MMHG | BODY MASS INDEX: 43.49 KG/M2

## 2021-10-08 DIAGNOSIS — F41.9 ANXIETY: ICD-10-CM

## 2021-10-08 DIAGNOSIS — Z71.89 OTHER SPECIFIED COUNSELING: ICD-10-CM

## 2021-10-08 DIAGNOSIS — R60.0 LOCALIZED EDEMA: ICD-10-CM

## 2021-10-08 DIAGNOSIS — R03.0 ELEVATED BLOOD PRESSURE READING WITHOUT DIAGNOSIS OF HYPERTENSION: Primary | ICD-10-CM

## 2021-10-08 LAB
ALBUMIN SERPL-MCNC: 2.8 G/DL (ref 3.4–5)
ALBUMIN UR-MCNC: NEGATIVE MG/DL
ALP SERPL-CCNC: 71 U/L (ref 40–150)
ALT SERPL W P-5'-P-CCNC: 40 U/L (ref 0–50)
ANION GAP SERPL CALCULATED.3IONS-SCNC: 6 MMOL/L (ref 3–14)
APPEARANCE UR: ABNORMAL
AST SERPL W P-5'-P-CCNC: 27 U/L (ref 0–45)
BASOPHILS # BLD AUTO: 0 10E3/UL (ref 0–0.2)
BASOPHILS NFR BLD AUTO: 0 %
BILIRUB SERPL-MCNC: 0.2 MG/DL (ref 0.2–1.3)
BILIRUB UR QL STRIP: NEGATIVE
BUN SERPL-MCNC: 9 MG/DL (ref 7–30)
CALCIUM SERPL-MCNC: 8.5 MG/DL (ref 8.5–10.1)
CHLORIDE BLD-SCNC: 113 MMOL/L (ref 94–109)
CO2 SERPL-SCNC: 24 MMOL/L (ref 20–32)
COLOR UR AUTO: YELLOW
CREAT SERPL-MCNC: 0.71 MG/DL (ref 0.52–1.04)
EOSINOPHIL # BLD AUTO: 0.5 10E3/UL (ref 0–0.7)
EOSINOPHIL NFR BLD AUTO: 6 %
ERYTHROCYTE [DISTWIDTH] IN BLOOD BY AUTOMATED COUNT: 15.1 % (ref 10–15)
GFR SERPL CREATININE-BSD FRML MDRD: >90 ML/MIN/1.73M2
GLUCOSE BLD-MCNC: 109 MG/DL (ref 70–99)
GLUCOSE UR STRIP-MCNC: NEGATIVE MG/DL
HCT VFR BLD AUTO: 32.2 % (ref 35–47)
HGB BLD-MCNC: 10 G/DL (ref 11.7–15.7)
HGB UR QL STRIP: ABNORMAL
HOLD SPECIMEN: NORMAL
IMM GRANULOCYTES # BLD: 0.1 10E3/UL
IMM GRANULOCYTES NFR BLD: 1 %
KETONES UR STRIP-MCNC: NEGATIVE MG/DL
LEUKOCYTE ESTERASE UR QL STRIP: ABNORMAL
LIPASE SERPL-CCNC: 108 U/L (ref 73–393)
LYMPHOCYTES # BLD AUTO: 1.9 10E3/UL (ref 0.8–5.3)
LYMPHOCYTES NFR BLD AUTO: 22 %
MCH RBC QN AUTO: 24.8 PG (ref 26.5–33)
MCHC RBC AUTO-ENTMCNC: 31.1 G/DL (ref 31.5–36.5)
MCV RBC AUTO: 80 FL (ref 78–100)
MONOCYTES # BLD AUTO: 0.7 10E3/UL (ref 0–1.3)
MONOCYTES NFR BLD AUTO: 7 %
MUCOUS THREADS #/AREA URNS LPF: PRESENT /LPF
NEUTROPHILS # BLD AUTO: 5.6 10E3/UL (ref 1.6–8.3)
NEUTROPHILS NFR BLD AUTO: 64 %
NITRATE UR QL: NEGATIVE
NRBC # BLD AUTO: 0 10E3/UL
NRBC BLD AUTO-RTO: 0 /100
PH UR STRIP: 5 [PH] (ref 5–7)
PLATELET # BLD AUTO: 280 10E3/UL (ref 150–450)
POTASSIUM BLD-SCNC: 3.4 MMOL/L (ref 3.4–5.3)
PROT SERPL-MCNC: 6.7 G/DL (ref 6.8–8.8)
RBC # BLD AUTO: 4.04 10E6/UL (ref 3.8–5.2)
RBC URINE: 28 /HPF
SODIUM SERPL-SCNC: 143 MMOL/L (ref 133–144)
SP GR UR STRIP: 1.01 (ref 1–1.03)
SQUAMOUS EPITHELIAL: 1 /HPF
UROBILINOGEN UR STRIP-MCNC: NORMAL MG/DL
WBC # BLD AUTO: 8.8 10E3/UL (ref 4–11)
WBC URINE: 67 /HPF

## 2021-10-08 PROCEDURE — 85025 COMPLETE CBC W/AUTO DIFF WBC: CPT | Performed by: EMERGENCY MEDICINE

## 2021-10-08 PROCEDURE — 87086 URINE CULTURE/COLONY COUNT: CPT | Performed by: EMERGENCY MEDICINE

## 2021-10-08 PROCEDURE — 36415 COLL VENOUS BLD VENIPUNCTURE: CPT | Performed by: EMERGENCY MEDICINE

## 2021-10-08 PROCEDURE — 83690 ASSAY OF LIPASE: CPT | Performed by: EMERGENCY MEDICINE

## 2021-10-08 PROCEDURE — 99214 OFFICE O/P EST MOD 30 MIN: CPT | Performed by: OBSTETRICS & GYNECOLOGY

## 2021-10-08 PROCEDURE — 82247 BILIRUBIN TOTAL: CPT | Performed by: EMERGENCY MEDICINE

## 2021-10-08 PROCEDURE — 93970 EXTREMITY STUDY: CPT

## 2021-10-08 PROCEDURE — 81001 URINALYSIS AUTO W/SCOPE: CPT | Performed by: EMERGENCY MEDICINE

## 2021-10-08 PROCEDURE — 250N000013 HC RX MED GY IP 250 OP 250 PS 637: Performed by: EMERGENCY MEDICINE

## 2021-10-08 PROCEDURE — 82040 ASSAY OF SERUM ALBUMIN: CPT | Performed by: EMERGENCY MEDICINE

## 2021-10-08 RX ORDER — CEPHALEXIN 500 MG/1
500 CAPSULE ORAL ONCE
Status: COMPLETED | OUTPATIENT
Start: 2021-10-08 | End: 2021-10-08

## 2021-10-08 RX ORDER — NIFEDIPINE 30 MG/1
30 TABLET, EXTENDED RELEASE ORAL DAILY
Qty: 60 TABLET | Refills: 1 | Status: SHIPPED | OUTPATIENT
Start: 2021-10-08 | End: 2022-01-11

## 2021-10-08 RX ORDER — ESCITALOPRAM OXALATE 20 MG/1
20 TABLET ORAL DAILY
Qty: 30 TABLET | Refills: 3 | Status: SHIPPED | OUTPATIENT
Start: 2021-10-08 | End: 2021-11-08

## 2021-10-08 RX ORDER — CEPHALEXIN 500 MG/1
500 CAPSULE ORAL 4 TIMES DAILY
Qty: 20 CAPSULE | Refills: 0 | Status: SHIPPED | OUTPATIENT
Start: 2021-10-08 | End: 2021-10-08

## 2021-10-08 RX ORDER — FUROSEMIDE 20 MG
20 TABLET ORAL
Qty: 2 TABLET | Refills: 0 | Status: SHIPPED | OUTPATIENT
Start: 2021-10-08 | End: 2021-11-08

## 2021-10-08 RX ADMIN — CEPHALEXIN 500 MG: 500 CAPSULE ORAL at 02:40

## 2021-10-08 ASSESSMENT — MIFFLIN-ST. JEOR: SCORE: 1860.46

## 2021-10-08 NOTE — PROGRESS NOTES
"[facility]    Post Partum Office Visit    CC: Post partum visit for complications    HPI: Mary Alice Jordan is a 36 year old  who presents for elevated blood pressure in the post-partum period.  Patient delivered on , at 37w1d gestation.  Patient delivered via , with no lacerations.      Patient presented to the ED last night due to some asymmetrical leg swelling. Had some leg swelling since delivery but had improvement on her left leg but right side persistent. Evaluation with doppler US in the ED was negative for DVTs. While she was there, it was noted that she had elevated blood pressure. She has no history of elevated BP in pregnancy. She denies any headaches or blurry vision. She just feels tired.     Also notes some increase in anxiety. Has previously been on Lexapro but stopped while pregnant. Interested in restarting this. She denies any concerns for severe depression, feels that she is bonding well with baby. No issues with taking care of herself or baby.     ROS:  General/Constitutional:  Denies chills or fever  Respiratory: Denies shortness of breath, cough, wheezing   Cardiovascular: Denies chest pain, exertional pain, irregular heartbeat  Gastrointestinal:  Denies abdominal pain, constipation, nausea, or vomiting  Genitourinary: Denies hematuria, difficulty urinating, frequency, or dysuria   Skin: Denies dry skin, itching, rash, new skin lesions  Musculoskeletal: Denies aching muscles or joints, swelling in joints, back pain, shoulder pain, or painful feet, + peripheral edema  Psychiatric: +anxiety    Physical Exam:   Vitals:    10/08/21 0832 10/08/21 0834   BP: (!) 154/85 (!) 148/83   BP Location: Right arm Right arm   Patient Position: Sitting Sitting   Cuff Size: Adult Large Adult Large   Pulse: 74    Resp: 12    Temp: 98.1  F (36.7  C)    TempSrc: Tympanic    Weight: 118.8 kg (261 lb 12.8 oz)    Height: 1.638 m (5' 4.5\")       Estimated body mass index is " "44.24 kg/m  as calculated from the following:    Height as of this encounter: 1.638 m (5' 4.5\").    Weight as of this encounter: 118.8 kg (261 lb 12.8 oz).    General appearance: well-hydrated, A&O x 3, no apparent distress  Lungs: Equal expansion bilaterally, no accessory muscle use  Heart: No heaves or thrills. No peripheral varicosities  Extremities: 2+ pitting edema in right foot, 1+ edema on left, no calf tenderness  Neuro: CN II-XII grossly intact  Breast: left nipple cracked with some scabbing, no surrounding erythema, no clogged ducts, no purulent drainage.       Assessment and Plan:     Encounter Diagnoses   Name Primary?     Elevated blood pressure reading without diagnosis of hypertension Yes     Anxiety      Localized edema      BP today 154/85, repeat 148/83. Labs obtained in ED last night. No proteinuria on UA despite hematuria, likely contamination from lochia. Will continue to monitor for symptoms preeclampsia. Start Procardia for hypertensive management. Will monitor BP at home, discussed severe range values. Also discussed hypotensive symptoms.     Patient is breastfeeding, offered dose of Lasix for comfort, also would likely help with BP. She will monitor swelling for now and be better about elevating legs. Rx sent in case edema does not improve.    Also evaluated left breast, cracked nipple with some scabbing. No signs of infection. Discussed alternating warm compresses with ice for comfort. Discussed signs of mastitis.    Patient has had an increase of her baseline anxiety. No signs of severe depression. Will restart Lexapro (was taking before pregnancy), Rx sent. Discussed medication initiation and weaning.     Was started on antibiotics for possible UTI from ED. Upon review of UA, likely just contamination from lochia post partum. UCx pending. Will discontinue antibiotics for now and watch for culture results.     Follow up: If BP remains stable with home BP monitoring, follow up in 4-5 weeks. " Discussed return precautions for follow up at sooner interval.    Kelly Girard,     30 minutes spent on the date of the encounter doing chart review, review of test results, patient visit and documentation.

## 2021-10-08 NOTE — ED PROVIDER NOTES
History     Chief Complaint   Patient presents with     Leg Swelling     Rt leg swelling post partum - 3 days after     HPI  Mary Alice Jordan is a 36 year old female with past medical history significant for iron deficiency anemia type 2 diabetes anxiety irritable bowel syndrome and recent vaginal delivery of a 37-week 1 day child without significant complications 10 days ago.  Patient states 3 days ago she began having swelling in her legs it started in her left foot and moved into her calves she is also had right foot swelling and now has more right leg swelling.  Patient denies any other significant shortness of breath has not had any fevers or chills denies any chest pain.  She states her bleeding is decreased and she is not having any pain with urination numbness weakness in extremity or bowel or bladder dysfunction.    Allergies:  No Known Allergies    Problem List:    Patient Active Problem List    Diagnosis Date Noted     Iron deficiency anemia secondary to inadequate dietary iron intake 2021     Priority: Medium      (normal spontaneous vaginal delivery) 2021     Priority: Medium     Prenatal care, subsequent pregnancy in third trimester 2021     Priority: Medium     Type 2 diabetes mellitus without complication, unspecified whether long term insulin use (H) 2021     Priority: Medium     Multigravida of advanced maternal age in first trimester 2021     Priority: Medium     Prenatal care, subsequent pregnancy 2021     Priority: Medium     FOB- Tino Julian       Anxiety 2020     Priority: Medium     Irregular menses 2016     Priority: Medium     Type 2 diabetes mellitus without complications (H) 10/23/2015     Priority: Medium      Hgb A1c=6.8  1. M Plan of care:Maintain target levels for blood sugar of equal or less than 90 FBS and 120 2hPP or 140 1hPP. HbA1c levels are of limited utility in pregnancy.   2. Obtain baseline urine PCR, obtain  maternal EKG, maternal ECHO to evaluate heart function if EKG is abnormal.   3. Follow up anatomy scan in 4 weeks and fetal ECHO in 4 weeks with pediatric cardiology.   4. Monthly growth scans.  5. Semiweekly BPP beginning at 32 weeks until delivery by 37-39 weeks depending on glycemic control.          Elevated liver enzymes 10/08/2015     Priority: Medium     Female infertility, secondary 10/06/2015     Priority: Medium     Morbidly obese (H) 08/14/2014     Priority: Medium        Past Medical History:    Past Medical History:   Diagnosis Date     Anxiety      Chickenpox      Irritable bowel syndrome (IBS)        Past Surgical History:    Past Surgical History:   Procedure Laterality Date     TONSILLECTOMY  6th grade       Family History:    Family History   Problem Relation Age of Onset     Heart Disease Father         MI     Diabetes Father      Cancer Maternal Grandfather        Social History:  Marital Status:  Single [1]  Social History     Tobacco Use     Smoking status: Former Smoker     Packs/day: 0.00     Smokeless tobacco: Never Used   Vaping Use     Vaping Use: Never used   Substance Use Topics     Alcohol use: Not Currently     Comment: occas-quit with pregnancy     Drug use: No        Medications:    acetone urine (KETOSTIX) test strip  aspirin 81 MG EC tablet  blood glucose (NO BRAND SPECIFIED) lancets standard  blood glucose (NO BRAND SPECIFIED) test strip  blood glucose monitoring (NO BRAND SPECIFIED) meter device kit  Continuous Blood Gluc  (FREESTYLE RADHA 14 DAY READER) PHIL  Continuous Blood Gluc Sensor (FREESTYLE RADHA 14 DAY SENSOR) MISC  escitalopram (LEXAPRO) 20 MG tablet  insulin pen needle (32G X 4 MM) 32G X 4 MM miscellaneous  Misc. Devices (BREAST PUMP) MISC  Prenatal Vit-Fe Fumarate-FA (PRENATAL VITAMIN PO)          Review of Systems  All systems reviewed and other than pertinent positives and negatives in HPI all other systems is negative.  Physical Exam   BP: (!)  "168/97  Pulse: 70  Temp: 97.9  F (36.6  C)  Resp: 18  Height: 165.1 cm (5' 5\")  Weight: 118.4 kg (261 lb)  SpO2: 98 %      Physical Exam  Vitals and nursing note reviewed.   Constitutional:       General: She is not in acute distress.     Appearance: Normal appearance. She is not ill-appearing, toxic-appearing or diaphoretic.   HENT:      Head: Normocephalic and atraumatic.      Nose: Nose normal.   Eyes:      Conjunctiva/sclera: Conjunctivae normal.   Cardiovascular:      Rate and Rhythm: Normal rate and regular rhythm.      Pulses: Normal pulses.      Heart sounds: Normal heart sounds. No murmur heard.     Pulmonary:      Effort: Pulmonary effort is normal.      Breath sounds: Normal breath sounds. No wheezing, rhonchi or rales.   Chest:      Chest wall: No tenderness.   Abdominal:      General: Abdomen is flat. Bowel sounds are normal. There is no distension.      Palpations: Abdomen is soft.      Tenderness: There is no abdominal tenderness. There is no right CVA tenderness or left CVA tenderness.   Musculoskeletal:         General: No tenderness. Normal range of motion.      Cervical back: Normal range of motion and neck supple.      Right lower leg: No edema.      Left lower leg: No edema.   Skin:     General: Skin is warm and dry.      Findings: No rash.   Neurological:      General: No focal deficit present.      Mental Status: She is alert and oriented to person, place, and time.      Motor: No weakness.      Coordination: Coordination normal.   Psychiatric:         Mood and Affect: Mood normal.         ED Course        Procedures              Critical Care time:  none             Labs Ordered and Resulted from Time of ED Arrival Up to the Time of Departure from the ED   ROUTINE UA WITH MICROSCOPIC REFLEX TO CULTURE - Abnormal; Notable for the following components:       Result Value    Appearance Urine Slightly Cloudy (*)     Blood Urine Large (*)     Leukocyte Esterase Urine Large (*)     Mucus Urine " Present (*)     RBC Urine 28 (*)     WBC Urine 67 (*)     All other components within normal limits    Narrative:     Urine Culture ordered based on laboratory criteria   COMPREHENSIVE METABOLIC PANEL - Abnormal; Notable for the following components:    Chloride 113 (*)     Glucose 109 (*)     Protein Total 6.7 (*)     Albumin 2.8 (*)     All other components within normal limits   CBC WITH PLATELETS AND DIFFERENTIAL - Abnormal; Notable for the following components:    Hemoglobin 10.0 (*)     Hematocrit 32.2 (*)     MCH 24.8 (*)     MCHC 31.1 (*)     RDW 15.1 (*)     Absolute Immature Granulocytes 0.1 (*)     All other components within normal limits   LIPASE - Normal   EXTRA BLUE TOP TUBE   EXTRA RED TOP TUBE   EXTRA GREEN TOP (LITHIUM HEPARIN) TUBE   CBC WITH PLATELETS & DIFFERENTIAL    Narrative:     The following orders were created for panel order CBC with platelets differential.  Procedure                               Abnormality         Status                     ---------                               -----------         ------                     CBC with platelets and d...[[id number]]  Abnormal            Final result                 Please view results for these tests on the individual orders.   EXTRA TUBE    Narrative:     The following orders were created for panel order [place] Draw.  Procedure                               Abnormality         Status                     ---------                               -----------         ------                     Extra Blue Top Tube[[id number]]                              Final result               Extra Red Top Tube[[id number]]                               Final result               Extra Green Top (Lithium...[[id number]]                      Final result                 Please view results for these tests on the individual orders.     Results for orders placed or performed during the hospital encounter of 10/07/21   US Lower Extremity Venous Duplex Bilateral     Narrative    EXAM: ULTRASOUND VENOUS BILATERAL LOWER EXTREMITIES WITH DOPPLER  LOCATION: [facility]  DATE/TIME: 10/8/2021 12:23 AM    INDICATION: Postpartum. Lower extremity swelling.  COMPARISON: None.    TECHNIQUE: Venous Duplex ultrasound of bilateral lower extremities with and without compression, augmentation and duplex. Color flow and spectral Doppler with waveform analysis performed.    FINDINGS: Exam includes the common femoral, femoral, popliteal veins as well as segmentally visualized deep calf veins and greater saphenous vein.    RIGHT: Normal compressibility of the common femoral, femoral, popliteal, posterior tibial, peroneal and great saphenous veins. Unremarkable Doppler waveform evaluation of the common femoral, femoral and popliteal veins.  LEFT: Normal compressibility of the common femoral, femoral, popliteal, posterior tibial, peroneal and great saphenous veins. Unremarkable Doppler waveform evaluation of the common femoral, femoral and popliteal veins.      Impression    IMPRESSION: No evidence of thrombus in the major veins of bilateral lower extremities.          Medications - No data to display    Assessments & Plan (with Medical Decision Making) records were reviewed.  Labs were obtained.  Blood pressure is noted to be elevated and concern for postpartum preeclampsia is warranted.  Venous Doppler ultrasound were obtained.  CBC with a white count of 8.8 hemoglobin 10.0 this is near baseline for patient.  Comprehensive metabolic panel without elevation of liver function tests.  UA with large blood large leukocyte esterase 28 RBCs 67 WBCs.  This definitely could be secondary to patient's recent vaginal delivery but is concerning for possible UTI.  Patient is breast-feeding.  Her blood pressure did come down with observation and there is no protein in her urine or elevation of liver function test.  Venous Doppler ultrasound was negative for DVT.  I have advised  patient to elevate legs much as possible I feel close follow-up with her OB/GYN later today would be warranted to discuss possible UTI and also have a recheck of her blood pressure and concern for possible postpartum preeclampsia.  Patient feels comfortable going home at this time return if symptoms worsen or new symptoms develop.     I have reviewed the nursing notes.    I have reviewed the findings, diagnosis, plan and need for follow up with the patient.       New Prescriptions    No medications on file       Final diagnoses:   Bilateral lower extremity edema   Acute lower UTI   Benign essential hypertension       10/7/2021   [facility] EMERGENCY DEPT     Abilio Peters MD  10/10/21 1912

## 2021-10-08 NOTE — NURSING NOTE
"Initial BP (!) 148/83 (BP Location: Right arm, Patient Position: Sitting, Cuff Size: Adult Large)   Pulse 74   Temp 98.1  F (36.7  C) (Tympanic)   Resp 12   Ht 1.638 m (5' 4.5\")   Wt 118.8 kg (261 lb 12.8 oz)   LMP  (LMP Unknown)   Breastfeeding Yes   BMI 44.24 kg/m   Estimated body mass index is 44.24 kg/m  as calculated from the following:    Height as of this encounter: 1.638 m (5' 4.5\").    Weight as of this encounter: 118.8 kg (261 lb 12.8 oz). .      "

## 2021-10-08 NOTE — DISCHARGE INSTRUCTIONS
Return if symptoms worsen or new symptoms develop.  Follow-up with your OB doctor this morning and discuss slightly elevated blood pressure and UTI and which antibiotic can be used with breast-feeding.  You have been given a dose of Keflex and have a prescription for Keflex.  Please pump and dump x2 after the last dose.  Do not breast-feed during Keflex until told it is safe to do so.  Have your blood pressure rechecked over the next few days.  If any headaches vomiting abdominal pain worsening swelling your legs or other symptoms present please return for recheck.  No evidence of blood clot was present.

## 2021-10-08 NOTE — TELEPHONE ENCOUNTER
"Mary Alice calling. A week ago Monday she had a baby. Four days after her feet got really swollen. Baby was readmitted for jaundice. One foot has gone down but the other is still really swollen. Patient has felt like she may be running a fever so she has been checking and nothing higher than 99.  The right foot swelling \"is out of control\". It has looked the same the entire time but the left foot swelling has gone down to just mild puffiness.   Denies pain, redness, warmth, chest pain, fever, shortness of breath.   Protocol recommends see HCP within 4 hours or PCP triage.   Call to on call provider Dr. Nicole Spear at 8:46 pm. Dr. Spear is recommending patient be seen in the ED to rule out DVT.   Call to patient to relay recommendations from on call provider.   Patient will present to [place] ED.  Priscilla Ramos RN   10/07/21 8:53 PM  [facility] Nurse Advisor      Reason for Disposition    [1] Thigh, calf, or ankle swelling AND [2] bilateral AND [3] 1 side is more swollen    Additional Information    Negative: Followed a leg injury    Negative: [1] Small area of swelling AND [2] followed an insect bite to the area    Negative: Swelling only of ankle    Negative: Swelling only of knee    Negative: Severe difficulty breathing (e.g., struggling for each breath, speaks in single words)    Negative: Sounds like a life-threatening emergency to the triager    Negative: Chest pain    Negative: [1] Difficulty breathing AND [2] new onset or worsening    Negative: SEVERE leg swelling (e.g., swelling extends above knee, entire leg is swollen)    Negative: [1] Red area or streak AND [2] fever    Negative: [1] Swelling is painful to touch AND [2] fever    Negative: [1] Cast on leg or ankle AND [2] now increased pain    Negative: New blurred vision or vision changes    Negative: SEVERE headache    Negative: Upper abdominal pain lasts > 1 hour    Negative: Patient sounds very sick or weak to the triager    Negative: " Swelling of face, arm or hands  (Exception: slight puffiness of fingers)    Negative: Preeclampsia or high blood pressure during pregnancy    Negative: [1] Thigh or calf pain AND [2] only 1 side AND [3] present > 1 hour    Negative: [1] Thigh, calf, or ankle swelling AND [2] only 1 side    Protocols used: POSTPARTUM - LEG SWELLING AND EDEMA-A-AH

## 2021-10-08 NOTE — PROGRESS NOTES
Clinic Care Coordination Contact    Background: Care Coordination referral placed from [facility] discharge report for reason of patient meeting criteria for a TCM outreach call by [facility] team.    Assessment: Upon chart review, [facility] Team member will cancel/close the referral for TCM outreach due to reason below:     Patient has a follow up appointment with an appropriate provider today for hospital discharge.     Plan: Care Coordination referral for TCM outreach canceled.    Sanam Garcia MA  [facility], [facility]

## 2021-10-09 ENCOUNTER — NURSE TRIAGE (OUTPATIENT)
Dept: NURSING | Facility: CLINIC | Age: 36
End: 2021-10-09

## 2021-10-09 LAB — BACTERIA UR CULT: NORMAL

## 2021-10-09 NOTE — TELEPHONE ENCOUNTER
Triage Call    Pt says she is 11 days post-partum and Currently has a headache 6/10.  Was having some B/P problems but taking nifedipine. Calling to ask if OK to take tylenol when on nifedipine.    Triaged to disposition of Home Care and Care advice given per postpartum HA Guideline.  Advised to call if HA persists over 2 hours after medication and ice treatment, or if B/P>140/90 after medication taken.    Lexi Eric, RN      Reason for Disposition    [1] Headache AND [2] has not taken pain medications    Additional Information    Negative: Difficult to awaken or acting confused (e.g., disoriented, slurred speech)    Negative: [1] Weakness of the face, arm or leg on one side of the body AND [2] new onset    Negative: [1] Numbness of the face, arm or leg on one side of the body AND [2] new onset    Negative: [1] Loss of speech or garbled speech AND [2] new onset    Negative: Passed out (i.e., lost consciousness, collapsed and was not responding)    Negative: Sounds like a life-threatening emergency to the triager    Negative: Followed a head injury    Negative: Pregnant    Negative: Traumatic Brain Injury (TBI) is suspected    Postpartum (from 0 to 6 weeks after delivery)    Negative: Difficult to awaken or acting confused (e.g., disoriented, slurred speech)    Negative: [1] Weakness of the face, arm or leg on one side of the body AND [2] new onset    Negative: [1] Numbness of the face, arm or leg on one side of the body AND [2] new onset    Negative: [1] Loss of speech or garbled speech AND [2] new onset    Negative: Sounds like a life-threatening emergency to the triager    Negative: Followed a head injury within last 3 days    Negative: Traumatic Brain Injury (TBI) is suspected    Negative: Sinus pain of forehead and yellow or green nasal discharge    Negative: Unable to walk, or can only walk with assistance (e.g., requires support)    Negative: Stiff neck (can't touch chin to chest)    Negative: Severe  "pain in one eye    Negative: [1] Other family members (or roommates) with headaches AND [2] possibility of carbon monoxide exposure    Negative: Systolic BP  >= 140 OR Diastolic BP >= 90    Negative: [1] SEVERE headache (e.g., excruciating) AND [2] \"worst headache\" of life    Negative: [1] SEVERE headache AND [2] sudden-onset (i.e., reaching maximum intensity within seconds)    Negative: [1] SEVERE headache AND [2] fever    Negative: [1] SEVERE headache AND [2] after spinal (epidural) anesthesia    Negative: [1] SEVERE headache AND [2] < 1 week since delivery    Negative: Loss of vision or double vision (Exception: similar to previous migraines)    Negative: New blurred vision or vision changes    Negative: New hand or face swelling    Negative: [1] Fever > 100.0 F (37.8 C) AND [2] diabetes mellitus or weak immune system (e.g., HIV positive, cancer chemo, splenectomy, organ transplant, chronic steroids)    Negative: Patient sounds very sick or weak to the triager    Negative: [1] SEVERE headache (e.g., excruciating) AND [2] not improved after 2 hours of pain medicine (Exception: similar to previous migraines)    Negative: [1] Vomiting AND [2] 2 or more times (Exception: similar to previous migraines)    Negative: Fever > 103 F (39.4 C)    Negative: Fever > 100.4 F (38.0 C)    Negative: [1] MODERATE headache (e.g., interferes with normal activities) AND [2] present > 24 hours AND [3] unexplained  (Exceptions: analgesics not tried, typical migraine, or headache part of viral illness)    Negative: [1] New headache AND [2] HIV positive    Negative: [1] Sinus pain of forehead AND [2] yellow or green nasal discharge    Negative: Fever present > 3 days (72 hours)    Negative: [1] MILD-MODERATE headache AND [2] present > 72 hours    Negative: Headache is a chronic symptom (recurrent or ongoing AND present > 4 weeks)    Negative: Similar to previously diagnosed migraine headaches    Protocols used: POSTPARTUM - " HEADACHE-A-AH, HEADACHE-A-AH

## 2021-10-11 ENCOUNTER — PATIENT OUTREACH (OUTPATIENT)
Dept: FAMILY MEDICINE | Facility: CLINIC | Age: 36
End: 2021-10-11

## 2021-10-17 ENCOUNTER — MYC MEDICAL ADVICE (OUTPATIENT)
Dept: OBGYN | Facility: CLINIC | Age: 36
End: 2021-10-17

## 2021-10-18 ENCOUNTER — HOSPITAL ENCOUNTER (OUTPATIENT)
Dept: ULTRASOUND IMAGING | Facility: CLINIC | Age: 36
Discharge: HOME | End: 2021-10-18
Attending: OBSTETRICS & GYNECOLOGY | Admitting: OBSTETRICS & GYNECOLOGY
Payer: COMMERCIAL

## 2021-10-18 DIAGNOSIS — R60.0 LOCALIZED EDEMA: ICD-10-CM

## 2021-10-18 PROCEDURE — 93971 EXTREMITY STUDY: CPT | Mod: RT

## 2021-10-18 NOTE — TELEPHONE ENCOUNTER
Order placed for venous ultrasound RLL per physician.    Patient aware and will make appointment.    Theresa HUFF RN  [facility]

## 2021-10-18 NOTE — TELEPHONE ENCOUNTER
S-(situation): continued swelling in foot    B-(background): recently seen to rule out blood clot and incidentally started on BP medications for HTN.     A-(assessment):Patient questions needing additional diuretics and/or follow up appointment with OB/GYN    R-(recommendations): Routed to physician for advisement.  Please see attached photo through patient [employer] message.    .Theresa Hameed RN on 10/18/2021 at 8:48 AM

## 2021-10-18 NOTE — TELEPHONE ENCOUNTER
By now, the swelling should be getting much better; she needs another lower extremity doppler to check for blood clots in case they missed it last time   Kaity Street MD   [facility]

## 2021-10-20 ENCOUNTER — MYC MEDICAL ADVICE (OUTPATIENT)
Dept: OBGYN | Facility: CLINIC | Age: 36
End: 2021-10-20

## 2021-10-28 NOTE — TELEPHONE ENCOUNTER
Patient would like to discuss birthcontrol options bcp's vs Depo at her appointment 11/3/21.    Theresa Hameed RN on 10/28/2021 at 9:13 AM

## 2021-11-08 ENCOUNTER — PRENATAL OFFICE VISIT (OUTPATIENT)
Dept: OBGYN | Facility: CLINIC | Age: 36
End: 2021-11-08
Payer: COMMERCIAL

## 2021-11-08 VITALS
HEIGHT: 65 IN | WEIGHT: 247 LBS | DIASTOLIC BLOOD PRESSURE: 64 MMHG | RESPIRATION RATE: 18 BRPM | TEMPERATURE: 98.6 F | BODY MASS INDEX: 41.15 KG/M2 | HEART RATE: 100 BPM | SYSTOLIC BLOOD PRESSURE: 127 MMHG

## 2021-11-08 DIAGNOSIS — Z30.011 ENCOUNTER FOR INITIAL PRESCRIPTION OF CONTRACEPTIVE PILLS: ICD-10-CM

## 2021-11-08 PROBLEM — O09.521 MULTIGRAVIDA OF ADVANCED MATERNAL AGE IN FIRST TRIMESTER: Status: RESOLVED | Noted: 2021-03-02 | Resolved: 2021-11-08

## 2021-11-08 PROBLEM — Z34.80 PRENATAL CARE, SUBSEQUENT PREGNANCY: Status: RESOLVED | Noted: 2021-02-17 | Resolved: 2021-11-08

## 2021-11-08 PROBLEM — D50.8 IRON DEFICIENCY ANEMIA SECONDARY TO INADEQUATE DIETARY IRON INTAKE: Status: RESOLVED | Noted: 2021-09-29 | Resolved: 2021-11-08

## 2021-11-08 PROBLEM — Z34.83 PRENATAL CARE, SUBSEQUENT PREGNANCY IN THIRD TRIMESTER: Status: RESOLVED | Noted: 2021-09-27 | Resolved: 2021-11-08

## 2021-11-08 PROCEDURE — 99207 PR POST PARTUM EXAM: CPT | Performed by: OBSTETRICS & GYNECOLOGY

## 2021-11-08 RX ORDER — ACETAMINOPHEN AND CODEINE PHOSPHATE 120; 12 MG/5ML; MG/5ML
0.35 SOLUTION ORAL DAILY
Qty: 84 TABLET | Refills: 3 | Status: SHIPPED | OUTPATIENT
Start: 2021-11-08 | End: 2022-04-15

## 2021-11-08 ASSESSMENT — MIFFLIN-ST. JEOR: SCORE: 1793.32

## 2021-11-08 ASSESSMENT — ANXIETY QUESTIONNAIRES
IF YOU CHECKED OFF ANY PROBLEMS ON THIS QUESTIONNAIRE, HOW DIFFICULT HAVE THESE PROBLEMS MADE IT FOR YOU TO DO YOUR WORK, TAKE CARE OF THINGS AT HOME, OR GET ALONG WITH OTHER PEOPLE: SOMEWHAT DIFFICULT
1. FEELING NERVOUS, ANXIOUS, OR ON EDGE: SEVERAL DAYS
GAD7 TOTAL SCORE: 7
2. NOT BEING ABLE TO STOP OR CONTROL WORRYING: MORE THAN HALF THE DAYS
3. WORRYING TOO MUCH ABOUT DIFFERENT THINGS: MORE THAN HALF THE DAYS
6. BECOMING EASILY ANNOYED OR IRRITABLE: NOT AT ALL
7. FEELING AFRAID AS IF SOMETHING AWFUL MIGHT HAPPEN: SEVERAL DAYS
5. BEING SO RESTLESS THAT IT IS HARD TO SIT STILL: NOT AT ALL

## 2021-11-08 ASSESSMENT — PATIENT HEALTH QUESTIONNAIRE - PHQ9: 5. POOR APPETITE OR OVEREATING: SEVERAL DAYS

## 2021-11-08 NOTE — PROGRESS NOTES
"Mary Alice is a 36 year old female 6 weeks S/P  of a liveborn baby boy.  The delivery was uncomplicated.  She is    still bleeding.  She is bottlefeeding, and has selected Micronor for birth control.  Her last PAP smear was 3/21, and was Normal; her  PHQ-9 inventory score is: 2    Exam: /64 (BP Location: Right arm, Patient Position: Chair, Cuff Size: Adult Large)   Pulse 100   Temp 98.6  F (37  C) (Tympanic)   Resp 18   Ht 1.638 m (5' 4.5\")   Wt 112 kg (247 lb)   LMP  (LMP Unknown)   Breastfeeding Yes   BMI 41.74 kg/m    perineal laceration healed, cervix parous, uterus small, non-tender, no adnexal masses and normal lochia    Assessment:  Postpartum    Plan:  RTC for annual exams and PRN and prescription given per orders  Kaity Street MD  [facility]      "

## 2021-11-08 NOTE — NURSING NOTE
"Initial /64 (BP Location: Right arm, Patient Position: Chair, Cuff Size: Adult Large)   Pulse 100   Temp 98.6  F (37  C) (Tympanic)   Resp 18   Ht 1.638 m (5' 4.5\")   Wt 112 kg (247 lb)   LMP  (LMP Unknown)   Breastfeeding Yes   BMI 41.74 kg/m   Estimated body mass index is 41.74 kg/m  as calculated from the following:    Height as of this encounter: 1.638 m (5' 4.5\").    Weight as of this encounter: 112 kg (247 lb). .      "

## 2021-11-09 ASSESSMENT — PATIENT HEALTH QUESTIONNAIRE - PHQ9: SUM OF ALL RESPONSES TO PHQ QUESTIONS 1-9: 2

## 2021-11-09 ASSESSMENT — ANXIETY QUESTIONNAIRES: GAD7 TOTAL SCORE: 7

## 2021-11-26 ENCOUNTER — MEDICAL CORRESPONDENCE (OUTPATIENT)
Dept: HEALTH INFORMATION MANAGEMENT | Facility: CLINIC | Age: 36
End: 2021-11-26
Payer: COMMERCIAL

## 2021-12-08 ENCOUNTER — MYC MEDICAL ADVICE (OUTPATIENT)
Dept: OBGYN | Facility: CLINIC | Age: 36
End: 2021-12-08
Payer: COMMERCIAL

## 2021-12-09 NOTE — TELEPHONE ENCOUNTER
Completed paperwork was given to Dr. Street to sign.    -Sara GARCIA [facility]  [facility]inic [facility] [place]

## 2022-01-09 ENCOUNTER — NURSE TRIAGE (OUTPATIENT)
Dept: NURSING | Facility: CLINIC | Age: 37
End: 2022-01-09
Payer: COMMERCIAL

## 2022-01-09 ENCOUNTER — HOSPITAL ENCOUNTER (EMERGENCY)
Facility: CLINIC | Age: 37
Discharge: STILL ADMITTED OR ADMINISTRATIVE | End: 2022-01-09
Payer: COMMERCIAL

## 2022-01-09 NOTE — TELEPHONE ENCOUNTER
Mary Alice reports she had a piece of chicken stuck in her throat last night. Can feel the slight discomfort in her jugular notch.    This happened years ago but resolved on its own.  Pt able to drink but after 10 minutes would feel the need to throw up. Feels like food/fluid does not pass through her throat. Has tried water, jello, pudding all of which she vomited.  Has not taken any breakfast.     Woke up in the middle of night feeling like she is going to choke.    Per protocol, advised ED. Care advice reviewed. Patient plans to wait and see if it would resolve on it own. Advised to call back with further questions/concerns.       Jessy Ramírez RN/[place] Nurse Advisor        Reason for Disposition    Can't swallow water or bread    Additional Information    Negative: Any difficulty breathing (e.g., coughing, wheezing or stridor)    Negative: Sounds like a life-threatening emergency to the triager    Negative: Symptoms of blocked esophagus (e.g., can't swallow normal secretions, drooling)    Negative: Symptoms of FB stuck in throat or esophagus (e.g., continued pain in throat or chest, FB sensation, blood-tinged saliva) (Exception: pill stuck)    Protocols used: SWALLOWED FOREIGN BODY-A-AH

## 2022-01-09 NOTE — TELEPHONE ENCOUNTER
[facility] outbound call:  Pt still reports that she is unable to keep food/fluids down.  Still contemplating on whether or not to go to ED.    FNA advised ED. Pt plans to wait and see if it would resolve.    Jessy Ramírez RN/[place] Nurse Advisor

## 2022-01-10 NOTE — TELEPHONE ENCOUNTER
Patient calling with concerns about:    Has a piece of chicken logged in her throat since evening of 1/8/22.   She is still unable to swallow anymore than very small sips of fluid  She has felt a bit light headed but she says' not horrible.'  She is nursing her 3 month old baby  She is home with her children alone ( is out of town)  Has an appointment for 1/10/22 at 8:40 am - she is wondering if she is ok to wait until that time or will she start choking during the night?    8:48 PM returned call from [facility], Dr. Escobar who advised;  She needs to go into ER now.  No provider in a clinic setting will be able to do anything because we do not have the necessary tools for that in the clinic.  Please call her again to tell her it's important she go into ER tonight and not prolong the agony.    Patient is notified of Dr. Escobar's above recommendation and verbalized understanding.  She will call a friend to help her and then go to [place] [facility].     Bernie Ko RN, Nurse Advisor 9:06 PM 1/9/2022      Reason for Disposition    [1] Drinking very little AND [2] dehydration suspected (e.g., no urine > 12 hours, very dry mouth, very lightheaded)    Additional Information    Negative: [1] Severe difficulty swallowing (e.g., drooling or spitting) AND [2] started suddenly after taking a medicine or allergic food    Negative: Wheezing, stridor, hoarseness, or difficulty breathing    Negative: [1] Swollen tongue AND [2] sudden onset    Negative: Sounds like a life-threatening emergency to the triager    Negative: Mouth ulcers are seen    Negative: Sore throat (throat pain with swallowing)    Negative: Swallowed a (non-edible) foreign body    Negative: Feeding tube, questions or concerns related to    Negative: Swelling of tongue    Negative: SEVERE difficulty swallowing (e.g., drooling or spitting, can't swallow water)    Negative: [1] Symptoms of blocked esophagus (e.g., can't swallow normal secretions, drooling) AND [2]  present now    Negative: Symptoms of food or bone stuck in throat or esophagus (e.g., pain in throat or chest, FB sensation, blood-tinged saliva)    Negative: SEVERE symptoms of pill stuck in throat or esophagus (e.g., severe pain, bleeding, or inability to swallow liquids)    Protocols used: SWALLOWING DIFFICULTY-A-AH

## 2022-01-11 ENCOUNTER — VIRTUAL VISIT (OUTPATIENT)
Dept: FAMILY MEDICINE | Facility: CLINIC | Age: 37
End: 2022-01-11
Payer: COMMERCIAL

## 2022-01-11 DIAGNOSIS — Z20.828 CONTACT WITH OR EXPOSURE TO VIRAL DISEASE: Primary | ICD-10-CM

## 2022-01-11 PROCEDURE — 99213 OFFICE O/P EST LOW 20 MIN: CPT | Mod: 95 | Performed by: NURSE PRACTITIONER

## 2022-01-11 NOTE — PROGRESS NOTES
"Mary Alice Jordan is a 36 year old who is being evaluated via a billable telephone visit.      What phone number would you like to be contacted at? [phone]  How would you like to obtain your AVS? MyChart    Assessment & Plan     Contact with or exposure to viral disease    - Symptomatic; Yes; 1/10/2022 COVID-19 Virus (Coronavirus) by PCR Nose; Future             BMI:   Estimated body mass index is 41.74 kg/m  as calculated from the following:    Height as of 11/8/21: 1.638 m (5' 4.5\").    Weight as of 11/8/21: 112 kg (247 lb).       FUTURE APPOINTMENTS:       - Follow up in 1 week for persistent symptoms, sooner for new or worsening symptoms.     See Patient Instructions    No follow-ups on file.    JAMESON [facility]    Direct line to schedule COVID screening/vaccination.   [phone]      Subjective   Mary Alice Jordan is a 36 year old who presents for the following health issues     HPI       Concern for COVID-19  About how many days ago did these symptoms start? Last night-cough and runny nose   Is this your first visit for this illness? Yes  In the 14 days before your symptoms started, have you had close contact with someone with COVID-19 (Coronavirus)? I do not know. Nephew is sick and getting tested for COVID today. Her son has croup.   Do you have a fever or chills? No  Are you having new or worsening difficulty breathing? No  Do you have new or worsening cough? Yes, I am coughing up mucus.  Have you had any new or unexplained body aches? No    Have you experienced any of the following NEW symptoms?    Headache: No    Sore throat: No    Loss of taste or smell: No    Chest pain: No    Diarrhea: No    Rash: No  What treatments have you tried? Cough drops  Who do you live with?  and 2 children   Are you, or a household member, a healthcare worker or a ? No  Do you live in a nursing home, group home, or shelter? No  Do you have a way to " get food/medications if quarantined? Yes, I have a friend or family member who can help me.            Review of Systems   Constitutional, HEENT, cardiovascular, pulmonary, gi and gu systems are negative, except as otherwise noted.      Objective           Vitals:  No vitals were obtained today due to virtual visit.    Physical Exam   healthy, alert and no distress  PSYCH: Alert and oriented times 3; coherent speech, normal   rate and volume, able to articulate logical thoughts, able   to abstract reason, no tangential thoughts, no hallucinations   or delusions  Her affect is normal and pleasant  RESP: No cough, no audible wheezing, able to talk in full sentences  Remainder of exam unable to be completed due to telephone visits                Phone call duration: 8 minutes

## 2022-01-11 NOTE — PATIENT INSTRUCTIONS
Patient Education       Coronavirus Disease 2019 (COVID-19): Caring for Yourself or Others   If you or a household member have symptoms of COVID-19, follow these guidelines for preventing spread of the virus and managing symptoms. This is regardless of your vaccination status.   If you have COVID-19 symptoms    Stay home. Call your healthcare provider and tell them you have symptoms.. Do this before going to any hospital or clinic. Follow your provider's instructions. You may be advised to isolate yourself at home. This is called self-isolation. You may also be told to stay at least 6 feet from others to prevent the spread of COVID-19. This is called social distancing.    Stay away from work, school, and public places. Limit physical contact with family members. Limit visitors. Don't kiss anyone or share eating or drinking utensils. Clean surfaces you touch with disinfectant. This is to help prevent the virus from spreading.    If you need to cough or sneeze, do it into a tissue. Then throw the tissue into the trash. If you don't have tissues, cough or sneeze into the bend of your elbow.    Wear a cloth face mask with 2 or more layers of washable, breathable fabric and a nose wire while in public or when indoors with people who don't live with you. Or you can wear a disposable paper mask with a cloth mask on top. Wear the mask so that it covers both your nose and mouth.    Don t share food or personal items with people in your household. This includes items like eating and drinking utensils, towels, and bedding.    If you need to go to a hospital or clinic, expect that the healthcare staff will wear protective equipment such as masks, gowns, gloves, and eye protection. You may be advised to wait in or enter through a separate area. This is to prevent the possible virus from spreading.    Tell the healthcare staff about recent travel. This includes local travel on public transport. Staff may need to find other  people you have been in contact with.    Follow all instructions the healthcare staff give you.    If you have been diagnosed with COVID-19    Stay home and start self-isolation. Don t leave your home unless you need to get medical care. Don't go to work, school, or public areas. Don't use public transportation or taxis.    Follow all instructions from your healthcare provider. Call your healthcare provider s office before going. They can prepare and give you instructions. This will help prevent the virus from spreading.    If you need to go to a hospital or clinic, expect that the healthcare staff will wear protective equipment such as masks, gowns, gloves, and eye protection. You may be advised to wait in or enter through a separate area. This is to prevent the possible virus from spreading.    Wear a face mask with 2 or more layers and a nose wire. Use either a cloth mask with layers of tightly woven, breathable fabric or a disposable paper mask with a cloth mask on top. This is to protect other people from your germs. If you are not able to wear a mask, your caregivers should. Wear the mask so that it covers both your nose and mouth.    Stay away from other people in your home.    Stay away from pets and other animals.    Don t share food or personal items with people in your household. This includes items like eating and drinking utensils, towels, and bedding.    If you need to cough or sneeze, do it into a tissue. Then throw the tissue into the trash. If you don't have tissues, cough or sneeze into the bend of your elbow.    Wash your hands often.    Self-care at home   Prevention  Several vaccines are available to prevent COVID-19 or reduce its severity. These vaccine reduce how severe the illness will be if you get the virus. No vaccine is ever 100% effective in preventing any illness, but the COVID-19 vaccines work well and are safe. One vaccine is available for people as young as 5. Expert groups,  including ACOG and CDC, advise pregnant or breastfeeding people to be vaccinated. Talk with your healthcare provider about which vaccine is best for you and your family.   Treatment  Current treatment is mainly aimed at helping your body while it fights the virus. This is known as supportive care. For serious COVID-19, you may need to stay in the hospital. Supportive care includes:     Getting rest. This helps your body fight the illness.    Staying hydrated.  Drinking liquids is the best way to prevent dehydration. Try to drink 6 to 8 glasses of liquids every day, or as advised by your provider. Also check with your provider about which fluids are best for you. Don't drink fluids that contain caffeine or alcohol.    Taking over-the-counter (OTC) pain medicine. These are used to help ease pain and reduce fever. Follow your healthcare provider's instructions for which OTC medicine to use.  If you've been treated for suspected or confirmed COVID-19 , follow all of your healthcare team's instructions. This will include when it's OK to stop self-isolation. You may also get instructions on position changes to help your breathing, such as lying on your belly (prone positioning). If you were treated at a hospital and discharged, you may be sent home with a pulse oximeter. This is a small electronic device that you clip on your fingertip. It measures the amount of oxygen in your body. Follow your healthcare team's instructions on its use, how they will be in touch with you, and let you know when to call them.   The FDA approved monoclonal antibody therapy for emergency investigational use in certain people who have a positive COVID-19 viral test and have mild to moderate symptoms but are not in the hospital. Your healthcare provider will advise you on whether monoclonal antibody therapy is appropriate for you. It's not approved to prevent COVID-19. It's approved for people 12 years and older who weigh at least 88 pounds  (about 40 kgs) and are at high risk for severe COVID-19 and a hospital stay. This includes people who are 65 years and older and people with certain chronic conditions. Monoclonal antibody therapy is not approved for people who:     Are in the hospital with COVID-19, or    Need oxygen therapy for COVID-19, or    Need oxygen therapy for a chronic condition and need to have oxygen flow increased because of COVID-19    If you've had confirmed COVID-19, your healthcare team may ask you to consider donating your plasma. This is called COVID-19 convalescent plasma donation. Plasma from people fully recovered from COVID-19 may contain antibodies to help fight COVID-19 in people who are currently seriously ill with the disease. Experts don't know the safety of COVID-19 convalescent plasma or how well it works. Research continues. The FDA has approved it for emergency use in certain people with serious or life-threatening COVID-19.   Home care for a sick person     Follow all instructions from healthcare staff.    Wash your hands often.    Wear protective clothing as advised.    Make sure the sick person wears a mask. If they can't wear a mask, don't stay in the same room with the person. If you must be in the same room, wear a face mask. When wearing a mask, make sure that it covers both the nose and mouth.    Keep track of the sick person s symptoms.    Clean home surfaces often with disinfectant. This includes phones, kitchen counters, fridge door handle, bathroom surfaces, and others.    Don t let anyone share household items with the sick person. This includes eating and drinking tools, towels, sheets, or blankets.    Clean fabrics and laundry thoroughly.    Keep other people and pets away from the sick person.    When you can stop self-isolation  When you are sick with COVID-19, you should stay away from other people. This is called self-isolation.   For normally healthy children or adults with COVID-19 symptoms, CDC  advises stopping self-isolation when all 3 of these are true:   1. You have had no fever for at least 24 hours. This means no fever without medicine that reduces fever, such as acetaminophen, for at least 24 hours.  2. Your symptoms such as cough or trouble breathing have improved.  3. It has been at least 10 days since your first symptoms started.  For people who have COVID-19 but no symptoms , isolation can stop 10 days after the first positive test.   If you have a weak immune system and COVID-19, or if you've had severe COVID-19,  your instructions on when to stop isolation will be somewhat different. Some conditions and treatments can cause a weak immune system. These include cancer treatment, bone marrow or organ transplants, and conditions such as HIV or other immune system disorders. You may be advised to self-isolate up to 20 days after your symptoms first started. Your healthcare provider may want to retest you for COVID-19. Follow your provider's instructions.   CDC mask guidance  Consider the CDC's guidance and your local community's instructions on face masks.       Cloth masks may help prevent people who have COVID-19 from spreading the virus to others.    Cloth masks are most likely to reduce COVID-19 spread when masks are widely used by people who are out in the public.    Wear a mask inside your house if you live with someone who has symptoms of COVID-19 or has tested positive for COVID-19.    CDC advises all people older than 2 who are not fully vaccinated to wear a mask and stay 6 feet away from others while in public.    CDC advises people with a weak immune system, even if fully vaccinated, to continue wearing masks and to stay 6 feet away from others while in public.    In  through grade 12 classes, CDC advises indoor masking for all teachers, staff, students ages 2 and older, and visitors to schools, regardless of vaccination status.    Like other viruses, the virus that causes  COVID-19 changes (mutates) all the time. This leads to variants that are easily spread, including the delta variant. To protect against variants, CDC advises all people over age 2, including those who are fully vaccinated, to wear a mask indoors in public settings if you are in an area of high numbers of COVID-19 cases. See the [facility]'s county data website for current transmission information in your area.    Certain people should not wear a face mask. This includes:     Children younger than 2 years old    Anyone with a health, developmental, or mental health condition that can be made worse by wearing a mask    Anyone who is unconscious or unable to remove the face covering without help    See the CDC's mask guidance.   When to call your healthcare provider  Call your healthcare provider right away if a sick person has any of these:     Trouble breathing    Pain or pressure in chest  If a sick person has any of these, call 911:    Trouble breathing that gets worse    Pain or pressure in chest that gets worse    Blue tint to lips or face    Fast or irregular heartbeat    Confusion or trouble waking    Fainting or loss of consciousness    Coughing up blood  Going home from the hospital   If you were diagnosed with COVID-19 and were recently discharged from the hospital:     Follow the instructions above for self-care and isolation.    Follow the hospital healthcare team s specific instructions.    Ask questions if anything is unclear to you. Write down answers so you remember them.  Date last modified: 11/3/2021  Luciana last reviewed this educational content on 4/1/2020 2000-2021 [employer]. All rights reserved. This information is not intended as a substitute for professional medical care. Always follow your healthcare professional's instructions.

## 2022-01-28 ENCOUNTER — MEDICAL CORRESPONDENCE (OUTPATIENT)
Dept: HEALTH INFORMATION MANAGEMENT | Facility: CLINIC | Age: 37
End: 2022-01-28
Payer: COMMERCIAL

## 2022-02-17 NOTE — TELEPHONE ENCOUNTER
"Gestational Diabetes Follow-up    Subjective/Objective:    Mary Alice Jordan sent in blood glucose log for review. Last date of communication was: 3-5-21.    Gestational diabetes is being managed with diet and activity    Taking diabetes medications: no    Estimated Date of Delivery: Oct 18, 2021    BG/Food Log:   From pt-\"The only number that has been elevated has been my wake up glucose.  It has been in the low 100's.  It has never been under 95.\"    Levemir at bedtime was requested from OBGYN on Friday and it was approved.    Assessment:    Ketones: NA.   Fasting blood glucoses: 0% in target.    Plan/Response:  Recommend that patient begin 19 units of Levemir insulin at bedtime tonight.  Follow-up appt on Weds as scheduled.  MyChart response sent.    Rosaura Lin RN, [facility]      Any diabetes medication dose changes were made via the CDE Protocol and Collaborative Practice Agreement with the patient's OB/GYN provider. A copy of this encounter was shared with the provider.      " LE Swelling

## 2022-04-01 ENCOUNTER — MEDICAL CORRESPONDENCE (OUTPATIENT)
Dept: HEALTH INFORMATION MANAGEMENT | Facility: CLINIC | Age: 37
End: 2022-04-01
Payer: COMMERCIAL

## 2022-04-15 ENCOUNTER — OFFICE VISIT (OUTPATIENT)
Dept: FAMILY MEDICINE | Facility: CLINIC | Age: 37
End: 2022-04-15
Payer: COMMERCIAL

## 2022-04-15 VITALS
WEIGHT: 243.1 LBS | DIASTOLIC BLOOD PRESSURE: 82 MMHG | TEMPERATURE: 98.9 F | BODY MASS INDEX: 40.5 KG/M2 | RESPIRATION RATE: 12 BRPM | OXYGEN SATURATION: 99 % | SYSTOLIC BLOOD PRESSURE: 134 MMHG | HEART RATE: 111 BPM | HEIGHT: 65 IN

## 2022-04-15 DIAGNOSIS — B34.9 VIRAL SYNDROME: ICD-10-CM

## 2022-04-15 DIAGNOSIS — J02.8 SORE THROAT (VIRAL): ICD-10-CM

## 2022-04-15 DIAGNOSIS — J35.8 TONSILLAR EXUDATE: ICD-10-CM

## 2022-04-15 DIAGNOSIS — B97.89 SORE THROAT (VIRAL): ICD-10-CM

## 2022-04-15 DIAGNOSIS — E11.9 TYPE 2 DIABETES MELLITUS WITHOUT COMPLICATION, UNSPECIFIED WHETHER LONG TERM INSULIN USE (H): Primary | ICD-10-CM

## 2022-04-15 LAB
DEPRECATED S PYO AG THROAT QL EIA: NEGATIVE
GROUP A STREP BY PCR: NOT DETECTED

## 2022-04-15 PROCEDURE — 87651 STREP A DNA AMP PROBE: CPT | Performed by: NURSE PRACTITIONER

## 2022-04-15 PROCEDURE — 99213 OFFICE O/P EST LOW 20 MIN: CPT | Performed by: NURSE PRACTITIONER

## 2022-04-15 RX ORDER — IBUPROFEN 400 MG/1
400 TABLET, FILM COATED ORAL EVERY 6 HOURS PRN
COMMUNITY
End: 2022-08-30

## 2022-04-15 ASSESSMENT — PAIN SCALES - GENERAL: PAINLEVEL: SEVERE PAIN (7)

## 2022-04-15 NOTE — PROGRESS NOTES
"Strep    Assessment & Plan     Type 2 diabetes mellitus without complication, unspecified whether long term insulin use (H)  Diagnosed during previous pregnancy.  Due for follow up and recheck labs.  Discussed with patient and ordered labs - will make follow up appointment.    - Lipid panel reflex to direct LDL Fasting  - Hemoglobin A1c  - Albumin Random Urine Quantitative with Creat Ratio  - Basic metabolic panel  (Ca, Cl, CO2, Creat, Gluc, K, Na, BUN)    Difficulty swallowing  Rapid negative. Suspect viral illness.  Recommend ibuprofen 600-800 mg every 8 hours as needed for pain/swelling.    - Streptococcus A Rapid Screen w/Reflex to PCR - Clinic Collect  - Group A Streptococcus PCR Throat Swab    Tonsillar exudate  Offered Mono testing - declined.    Viral syndrome           BMI:   Estimated body mass index is 41.08 kg/m  as calculated from the following:    Height as of this encounter: 1.638 m (5' 4.5\").    Weight as of this encounter: 110.3 kg (243 lb 1.6 oz).   Weight management plan: Patient was referred to their PCP to discuss a diet and exercise plan.    See Patient Instructions    No follow-ups on file.    Itzel Jordan NP  [facility]   Mary Alice Jordan is a 36 year old who presents for the following health issues     History of Present Illness       Diabetes:   She presents for follow up of diabetes.  She is checking home blood glucose one time daily. She checks blood glucose after meals.  Blood glucose is sometimes over 200 and never under 70. She is aware of hypoglycemia symptoms including shakiness. She has no concerns regarding her diabetes at this time.  She is not experiencing numbness or burning in feet, excessive thirst, blurry vision, weight changes or redness, sores or blisters on feet. The patient has not had a diabetic eye exam in the last 12 months.         She eats 2-3 servings of fruits and vegetables daily.She consumes 1 sweetened beverage(s) " "daily.She exercises with enough effort to increase her heart rate 30 to 60 minutes per day.  She exercises with enough effort to increase her heart rate 6 days per week.   She is taking medications regularly.       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  No reported fevers - but feeling flushed.  Some mild headaches.  Some upper respiratory symptoms  Taking Ibuprofen as needed.    Still breastfeeding - has 6 month old at home.    Review of Systems   Constitutional, HEENT, cardiovascular, pulmonary, GI, , musculoskeletal, neuro, skin, endocrine and psych systems are negative, except as otherwise noted.      Objective    /82 (BP Location: Right arm, Patient Position: Chair, Cuff Size: Adult Regular)   Pulse 111   Temp 98.9  F (37.2  C) (Tympanic)   Resp 12   Ht 1.638 m (5' 4.5\")   Wt 110.3 kg (243 lb 1.6 oz)   SpO2 99%   Breastfeeding Yes   BMI 41.08 kg/m    Body mass index is 41.08 kg/m .  Physical Exam   GENERAL: healthy, alert and no distress  HENT: normal cephalic/atraumatic, ear canals and TM's normal, nose and mouth without ulcers or lesions, oral mucous membranes moist, tonsillar hypertrophy, tonsillar erythema and tonsillar exudate  NECK: bilateral anterior cervical adenopathy, no " asymmetry, masses, or scars and thyroid normal to palpation  RESP: lungs clear to auscultation - no rales, rhonchi or wheezes  CV: regular rate and rhythm, normal S1 S2, no S3 or S4, no murmur, click or rub, no peripheral edema and peripheral pulses strong  ABDOMEN: soft, nontender, no hepatosplenomegaly, no masses and bowel sounds normal  MS: no gross musculoskeletal defects noted, no edema    Results for orders placed or performed in visit on 04/15/22   Streptococcus A Rapid Screen w/Reflex to PCR - Clinic Collect     Status: Normal    Specimen: Throat; Swab   Result Value Ref Range    Group A Strep antigen Negative Negative

## 2022-04-24 ENCOUNTER — HEALTH MAINTENANCE LETTER (OUTPATIENT)
Age: 37
End: 2022-04-24

## 2022-06-17 ENCOUNTER — OFFICE VISIT (OUTPATIENT)
Dept: FAMILY MEDICINE | Facility: CLINIC | Age: 37
End: 2022-06-17
Payer: COMMERCIAL

## 2022-06-17 VITALS
DIASTOLIC BLOOD PRESSURE: 78 MMHG | HEART RATE: 86 BPM | OXYGEN SATURATION: 98 % | WEIGHT: 246.4 LBS | HEIGHT: 65 IN | BODY MASS INDEX: 41.05 KG/M2 | TEMPERATURE: 98.3 F | RESPIRATION RATE: 16 BRPM | SYSTOLIC BLOOD PRESSURE: 122 MMHG

## 2022-06-17 DIAGNOSIS — M77.11 LATERAL EPICONDYLITIS OF RIGHT ELBOW: Primary | ICD-10-CM

## 2022-06-17 PROCEDURE — 99213 OFFICE O/P EST LOW 20 MIN: CPT | Performed by: FAMILY MEDICINE

## 2022-06-17 NOTE — PROGRESS NOTES
"  Assessment & Plan     Lateral epicondylitis of right elbow    - education and handout given  - I was unable to provide a brace - none in stock, but showed her what they look like online    Patient Instructions   Look into a \"tennis elbow\" brace  Start icing several times a day    Return if symptoms worsen or fail to improve in the next 2-3 weeks.    Nette Hunter MD  [facility]   Mary Alice Jordan is a 37 year old, presenting for the following health issues:    Musculoskeletal Problem    History of Present Illness       Reason for visit:  Elbow pain for last few months  Symptom onset:  More than a month  Symptoms include:  Elbow pain and now finding it difficult to lift things with arm  Symptom intensity:  Moderate  Symptom progression:  Worsening  Had these symptoms before:  No  What makes it worse:  Lifting  What makes it better:  Ibuprofen    She eats 2-3 servings of fruits and vegetables daily.She consumes 0 sweetened beverage(s) daily.She exercises with enough effort to increase her heart rate 30 to 60 minutes per day.  She exercises with enough effort to increase her heart rate 7 days per week.   She is taking medications regularly.       Pain History:  When did you first notice your pain? - Acute Pain   Have you seen anyone else for your pain? No  Where in your body do you have pain? Musculoskeletal problem/pain  Onset/Duration: 3-4 months   Description  Location: elbow - right  Joint Swelling: no  Redness: no  Pain: YES- ache, sharp with movement or lifting things   Warmth: no  Intensity:  moderate  Progression of Symptoms:  worsening and constant  Accompanying signs and symptoms:   Fevers: no  Numbness/tingling/weakness: YES- numbness in her fingertips, intermittent   History  Trauma to the area: carrying car seat?   Recent illness:  no  Previous similar problem: no  Previous evaluation:  no  Precipitating or alleviating factors:  Aggravating factors include: " "lifting and overuse  Therapies tried and outcome:  Ibuprofen-somewhat effective - doesn't like that she keeps taking it though    In feb was already noticing but kind of just felt like it was from holding car seat on that spot  Starting to worsen though and affect daily tasks such as even picking up her water bottle  Past week or so starting to feel some hand and finger numbness - she can't tell which fingers just feels like it wakes up by pumping her hand open/close a few times  She's often a stomach sleeper  Has not had before  Feels like she has normal ROM/strength but the pain makes her stop doing the activities    Review of Systems   Constitutional, HEENT, cardiovascular, pulmonary, gi and gu systems are negative, except as otherwise noted.      Objective    /78   Pulse 86   Temp 98.3  F (36.8  C) (Tympanic)   Resp 16   Ht 1.638 m (5' 4.5\")   Wt 111.8 kg (246 lb 6.4 oz)   SpO2 98%   BMI 41.64 kg/m    Body mass index is 41.64 kg/m .  Physical Exam   GENERAL: healthy, alert and no distress  RESP: normal respiratory effort, speaking in complete sentences  MS: swelling over the lateral epicondyle and point TTP over it but also just diffusely over the area. no gross musculoskeletal defects noted otherwise  PSYCH: mentation appears normal, affect normal/bright              .  ..  "

## 2022-07-26 NOTE — TELEPHONE ENCOUNTER
Mom(emperatriz)  Pt is calling.    See telephone message from earlier call today.    4 days ago she started to notice some bilateral pedal edema. Patient is post partum. Bilateral pedal edema. Right is more swollen than the left.  She stated that she has been elevating her legs and the swelling is going down.  Denies redness, pain, shortness of breath, chest pain, chest pressure,  HA, visual changes.   Per triage and on call physician, patient was advised to go to the ED for evaluation to rule out a blood clot.  She is wondering if she still needs to go even though the swelling is decreasing. One side is still much more swollen than the other.  I advised her that she should still go to the ED for evaluation. Still a chance of blood clot with one being more swollen, even with decrease swelling. You want to catch it early and before it could possibly move to her heart or lungs.  She verbalized understanding and will go to the ED as instructed.    Reason for Disposition    [1] Thigh, calf, or ankle swelling AND [2] bilateral AND [3] 1 side is more swollen    Additional Information    Negative: Severe difficulty breathing (e.g., struggling for each breath, speaks in single words)    Negative: Sounds like a life-threatening emergency to the triager    Negative: Followed a leg injury    Negative: [1] Small area of swelling AND [2] followed an insect bite to the area    Negative: Swelling only of ankle    Negative: Swelling only of knee    Negative: Chest pain    Negative: [1] Difficulty breathing AND [2] new onset or worsening    Negative: SEVERE leg swelling (e.g., swelling extends above knee, entire leg is swollen)    Negative: [1] Red area or streak AND [2] fever    Negative: [1] Swelling is painful to touch AND [2] fever    Negative: [1] Cast on leg or ankle AND [2] now increased pain    Negative: New blurred vision or vision changes    Negative: SEVERE headache    Negative: Upper abdominal pain lasts > 1 hour    Negative: Patient  sounds very sick or weak to the triager    Negative: Swelling of face, arm or hands  (Exception: slight puffiness of fingers)    Negative: Preeclampsia or high blood pressure during pregnancy    Negative: [1] Thigh or calf pain AND [2] only 1 side AND [3] present > 1 hour    Negative: [1] Thigh, calf, or ankle swelling AND [2] only 1 side    Protocols used: POSTPARTUM - LEG SWELLING AND EDEMA-A-    Nadia Garcia RN  [facility] Nurse Advisor  10/7/2021 at 10:04 PM     Dad

## 2022-08-29 ENCOUNTER — E-VISIT (OUTPATIENT)
Dept: FAMILY MEDICINE | Facility: CLINIC | Age: 37
End: 2022-08-29

## 2022-08-29 DIAGNOSIS — J02.9 SORE THROAT: Primary | ICD-10-CM

## 2022-08-29 PROCEDURE — 99207 PR NON-BILLABLE SERV PER CHARTING: CPT | Performed by: FAMILY MEDICINE

## 2022-08-30 ENCOUNTER — OFFICE VISIT (OUTPATIENT)
Dept: FAMILY MEDICINE | Facility: CLINIC | Age: 37
End: 2022-08-30
Payer: COMMERCIAL

## 2022-08-30 VITALS
DIASTOLIC BLOOD PRESSURE: 82 MMHG | BODY MASS INDEX: 40.98 KG/M2 | HEIGHT: 65 IN | RESPIRATION RATE: 18 BRPM | OXYGEN SATURATION: 99 % | WEIGHT: 246 LBS | HEART RATE: 81 BPM | SYSTOLIC BLOOD PRESSURE: 134 MMHG | TEMPERATURE: 99.4 F

## 2022-08-30 DIAGNOSIS — R07.0 THROAT PAIN: ICD-10-CM

## 2022-08-30 DIAGNOSIS — J02.9 ACUTE PHARYNGITIS, UNSPECIFIED ETIOLOGY: Primary | ICD-10-CM

## 2022-08-30 LAB — DEPRECATED S PYO AG THROAT QL EIA: NEGATIVE

## 2022-08-30 PROCEDURE — 99213 OFFICE O/P EST LOW 20 MIN: CPT | Performed by: NURSE PRACTITIONER

## 2022-08-30 PROCEDURE — 87651 STREP A DNA AMP PROBE: CPT | Performed by: NURSE PRACTITIONER

## 2022-08-30 ASSESSMENT — PAIN SCALES - GENERAL: PAINLEVEL: SEVERE PAIN (6)

## 2022-08-30 ASSESSMENT — ENCOUNTER SYMPTOMS
MYALGIAS: 1
SORE THROAT: 1
HEADACHES: 1

## 2022-08-30 NOTE — PATIENT INSTRUCTIONS
Vitamin D3 [id number] IU daily  Vitamin C   Zinc  Quercitin     Take Ibuprofen 400 mg with Acetaminophen (Tylenol) 1000 mg every 6 hours for acute pain.  Do not take more than 4000 mg of acetaminophen (Tylenol) in a 24 hour period.

## 2022-08-30 NOTE — PATIENT INSTRUCTIONS
Thank you for choosing us for your care. I think an in-clinic visit would be best next steps based on your symptoms. Please schedule a clinic appointment; you won t be charged for this eVisit.      You can schedule an appointment right here in [facility], or call [phone]

## 2022-08-30 NOTE — PROGRESS NOTES
"  Assessment & Plan     Acute pharyngitis, unspecified etiology  Rapid strep was negative, will wait for the strep culture before treating with abx. Otherwise symptomatic treatment for pain management with Tylenol/ibporfen    Throat pain  - Streptococcus A Rapid Screen w/Reflex to PCR - Clinic Collect  - Group A Streptococcus PCR Throat Swab    Return in about 1 week (around 9/6/2022) for Follow up.    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 Continues to have symptoms of sore throat and headaches    Review of Systems   HENT: Positive for sore throat.    Musculoskeletal: Positive for myalgias.   Neurological: Positive for headaches.            Objective    /82   Pulse 81   Temp 99.4  F (37.4  C) (Tympanic)   Resp 18   Ht 1.638 m (5' 4.5\")   Wt 111.6 kg (246 lb)   SpO2 99%   BMI 41.57 kg/m    Body mass index is 41.57 kg/m .  Physical Exam  Constitutional:       Appearance: Normal appearance.   HENT:      Right Ear: Tympanic membrane normal.      Left Ear: Tympanic membrane normal.      Mouth/Throat:      Mouth: Mucous membranes are moist.      Pharynx: Uvula midline. Oropharyngeal exudate, posterior oropharyngeal erythema and uvula swelling present.      Comments: No " tonsils  Cardiovascular:      Rate and Rhythm: Normal rate and regular rhythm.      Heart sounds: Normal heart sounds.   Pulmonary:      Effort: Pulmonary effort is normal.      Breath sounds: Normal breath sounds.   Musculoskeletal:      Cervical back: Normal range of motion.   Lymphadenopathy:      Cervical: No cervical adenopathy.   Skin:     General: Skin is warm and dry.   Neurological:      Mental Status: She is alert and oriented to person, place, and time.   Psychiatric:         Mood and Affect: Mood normal.         Behavior: Behavior normal.         Thought Content: Thought content normal.         Judgment: Judgment normal.                    .  ..

## 2022-08-31 DIAGNOSIS — J02.9 ACUTE PHARYNGITIS, UNSPECIFIED ETIOLOGY: Primary | ICD-10-CM

## 2022-08-31 LAB — GROUP A STREP BY PCR: NOT DETECTED

## 2022-08-31 RX ORDER — METHYLPREDNISOLONE 4 MG
TABLET, DOSE PACK ORAL
Qty: 21 TABLET | Refills: 0 | Status: SHIPPED | OUTPATIENT
Start: 2022-08-31 | End: 2022-09-12

## 2022-09-04 ENCOUNTER — E-VISIT (OUTPATIENT)
Dept: URGENT CARE | Facility: CLINIC | Age: 37
End: 2022-09-04
Payer: COMMERCIAL

## 2022-09-04 DIAGNOSIS — J01.90 ACUTE BACTERIAL SINUSITIS: Primary | ICD-10-CM

## 2022-09-04 DIAGNOSIS — B96.89 ACUTE BACTERIAL SINUSITIS: Primary | ICD-10-CM

## 2022-09-04 PROCEDURE — 99421 OL DIG E/M SVC 5-10 MIN: CPT | Performed by: PHYSICIAN ASSISTANT

## 2022-09-05 NOTE — PATIENT INSTRUCTIONS
Sinusitis (Antibiotic Treatment)    The sinuses are air-filled spaces within the bones of the face. They connect to the inside of the nose. Sinusitis is an inflammation of the tissue that lines the sinuses. Sinusitis can occur during a cold. It can also happen due to allergies to pollens and other particles in the air. Sinusitis can cause symptoms of sinus congestion and a feeling of fullness. A sinus infection causes fever, headache, and facial pain. There is often green or yellow fluid draining from the nose or into the back of the throat (post-nasal drip). You have been given antibiotics to treat this condition.   Home care    Take the full course of antibiotics as instructed. Don't stop taking them, even when you feel better.    Drink plenty of water, hot tea, and other liquids as directed by the healthcare provider. This may help thin nasal mucus. It also may help your sinuses drain fluids.    Heat may help soothe painful areas of your face. Use a towel soaked in hot water. Or,  the shower and direct the warm spray onto your face. Using a vaporizer along with a menthol rub at night may also help soothe symptoms.     An expectorant with guaifenesin may help thin nasal mucus and help your sinuses drain fluids. Talk with your provider or pharmacists before taking an over-the-counter (OTC) medicine if you have any questions about it or its side effects..    You can use an OTC decongestant, unless a similar medicine was prescribed to you. Nasal sprays work the fastest. Use one that contains phenylephrine or oxymetazoline. First blow your nose gently. Then use the spray. Don't use these medicines more often than directed on the label. If you do, your symptoms may get worse. You may also take pills that contain pseudoephedrine. Don t use products that combine multiple medicines. This is because side effects may be increased. Read labels. You can also ask the pharmacist for help. (People with high blood  pressure should not use decongestants. They can raise blood pressure.) Talk with your provider or pharmacist if you have any questions about the medicine..    OTC antihistamines may help if allergies contributed to your sinusitis. Talk with your provider or pharmacist if you have any questions about the medicine..    Don't use nasal rinses or irrigation during an acute sinus infection, unless your healthcare provider tells you to. Rinsing may spread the infection to other areas in your sinuses.    Use acetaminophen or ibuprofen to control pain, unless another pain medicine was prescribed to you. If you have chronic liver or kidney disease or ever had a stomach ulcer, talk with your healthcare provider before using these medicines. Never give aspirin to anyone under age 18 who is ill with a fever. It may cause severe liver damage.    Don't smoke. This can make symptoms worse.    Follow-up care  Follow up with your healthcare provider, or as advised.   When to seek medical advice  Call your healthcare provider if any of these occur:     Facial pain or headache that gets worse    Stiff neck    Unusual drowsiness or confusion    Swelling of your forehead or eyelids    Symptoms don't go away in 10 days    Vision problems, such as blurred or double vision    Fever of 100.4 F (38 C) or higher, or as directed by your healthcare provider  Call 911  Call 911 if any of these occur:     Seizure    Trouble breathing    Feeling dizzy or faint    Fingernails, skin or lips look blue, purple , or gray  Prevention  Here are steps you can take to help prevent an infection:     Keep good hand washing habits.    Don t have close contact with people who have sore throats, colds, or other upper respiratory infections.    Don t smoke, and stay away from secondhand smoke.    Stay up to date with of your vaccines.  [employer] last reviewed this educational content on 12/1/2019 2000-2021 [employer]. All rights reserved. This  information is not intended as a substitute for professional medical care. Always follow your healthcare professional's instructions.        Dear Mary Alice Jordan    After reviewing your responses, I've been able to diagnose you with?a sinus infection caused by bacteria.?     Based on your responses and diagnosis, I have prescribed Augmentin to treat your symptoms. I have sent this to your pharmacy.?     It is also important to stay well hydrated, get lots of rest and take over-the-counter decongestants,?tylenol?or ibuprofen if you?are able to?take those medications per your primary care provider to help relieve discomfort.?     It is important that you take?all of?your prescribed medication even if your symptoms are improving after a few doses.? Taking?all of?your medicine helps prevent the symptoms from returning.?     If your symptoms worsen, you develop severe headache, vomiting, high fever (>102), or are not improving in 7 days, please contact your primary care provider for an appointment or visit any of our convenient Walk-in Care or Urgent Care Centers to be seen which can be found on our website?here.?     Thanks again for choosing?us?as your health care partner,?   ?  Freeman Bryson PA-C?

## 2022-09-12 ENCOUNTER — VIRTUAL VISIT (OUTPATIENT)
Dept: FAMILY MEDICINE | Facility: CLINIC | Age: 37
End: 2022-09-12
Payer: COMMERCIAL

## 2022-09-12 DIAGNOSIS — F41.9 ANXIETY: Primary | ICD-10-CM

## 2022-09-12 PROCEDURE — 99213 OFFICE O/P EST LOW 20 MIN: CPT | Mod: 95 | Performed by: FAMILY MEDICINE

## 2022-09-12 RX ORDER — ESCITALOPRAM OXALATE 10 MG/1
10 TABLET ORAL DAILY
Qty: 90 TABLET | Refills: 3 | Status: SHIPPED | OUTPATIENT
Start: 2022-09-12 | End: 2023-02-06

## 2022-09-12 ASSESSMENT — ANXIETY QUESTIONNAIRES
3. WORRYING TOO MUCH ABOUT DIFFERENT THINGS: NEARLY EVERY DAY
7. FEELING AFRAID AS IF SOMETHING AWFUL MIGHT HAPPEN: NEARLY EVERY DAY
GAD7 TOTAL SCORE: 17
6. BECOMING EASILY ANNOYED OR IRRITABLE: SEVERAL DAYS
IF YOU CHECKED OFF ANY PROBLEMS ON THIS QUESTIONNAIRE, HOW DIFFICULT HAVE THESE PROBLEMS MADE IT FOR YOU TO DO YOUR WORK, TAKE CARE OF THINGS AT HOME, OR GET ALONG WITH OTHER PEOPLE: SOMEWHAT DIFFICULT
GAD7 TOTAL SCORE: 17
1. FEELING NERVOUS, ANXIOUS, OR ON EDGE: NEARLY EVERY DAY
5. BEING SO RESTLESS THAT IT IS HARD TO SIT STILL: SEVERAL DAYS
2. NOT BEING ABLE TO STOP OR CONTROL WORRYING: NEARLY EVERY DAY

## 2022-09-12 ASSESSMENT — PATIENT HEALTH QUESTIONNAIRE - PHQ9
5. POOR APPETITE OR OVEREATING: NEARLY EVERY DAY
SUM OF ALL RESPONSES TO PHQ QUESTIONS 1-9: 3

## 2022-09-12 NOTE — PROGRESS NOTES
Mary Alice Jordan is a 37 year old who is being evaluated via a billable video visit.      How would you like to obtain your AVS? MyChart  If the video visit is dropped, the invitation should be resent by: Text to cell phone: [phone]  Will anyone else be joining your video visit? No        Assessment & Plan         Anxiety  - escitalopram (LEXAPRO) 10 MG tablet; Take 1 tablet (10 mg) by mouth daily    [id number]}     FUTURE APPOINTMENTS:       - Follow-up visit in one month or sooner as needed.    Return in about 4 weeks (around 10/10/2022) for Follow up.    Jose Escobar MD  [facility]    Subjective   Mary Alice Jordan is a 37 year old, presenting for the following health issues:  Anxiety      HPI   Patient is a 37 yr old female here for anxiety. She was on medication prior to her getting pregnant ,she stopped her medication but feels like she is unable to cope with the anxiety. She will like to go back on her medication. She has stopped breast feeding so she is a bit comfortable re starting her medications, she is also wanting an increase in the dose.   Anxiety Follow-Up    How are you doing with your anxiety since your last visit? Worsened-Would like to discuss restarting medication. Decided to stop taking anxiety while breastfeeding.     Are you having other symptoms that might be associated with anxiety? Yes:  Separation anxiety when away from her children, crying easily    Have you had a significant life event? Infant has health concerns    Are you feeling depressed? No    Do you have any concerns with your use of alcohol or other drugs? No    Social History     Tobacco Use     Smoking status: Former Smoker     Packs/day: 0.00     Smokeless tobacco: Never Used   Vaping Use     Vaping Use: Never used   Substance Use Topics     Alcohol use: Not Currently     Comment: quit with pregnancy     Drug use: No     ROBERT-7 SCORE 11/5/2020 11/8/2021 9/12/2022   Total Score 12 7 17     PHQ  11/5/2020 11/8/2021 9/12/2022   PHQ-9 Total Score 1 2 3   Q9: Thoughts of better off dead/self-harm past 2 weeks Not at all Not at all Not at all     Last PHQ-9 9/12/2022   1.  Little interest or pleasure in doing things 0   2.  Feeling down, depressed, or hopeless 0   3.  Trouble falling or staying asleep, or sleeping too much 0   4.  Feeling tired or having little energy 0   5.  Poor appetite or overeating 1   6.  Feeling bad about yourself 0   7.  Trouble concentrating 1   8.  Moving slowly or restless 1   Q9: Thoughts of better off dead/self-harm past 2 weeks 0   PHQ-9 Total Score 3   Difficulty at work, home, or with people Somewhat difficult     ROBERT-7  9/12/2022   1. Feeling nervous, anxious, or on edge 3   2. Not being able to stop or control worrying 3   3. Worrying too much about different things 3   4. Trouble relaxing 3   5. Being so restless that it is hard to sit still 1   6. Becoming easily annoyed or irritable 1   7. Feeling afraid, as if something awful might happen 3   ROBERT-7 Total Score 17   If you checked any problems, how difficult have they made it for you to do your work, take care of things at home, or get along with other people? Somewhat difficult             Review of Systems   Constitutional, HEENT, cardiovascular, pulmonary, gi and gu systems are negative, except as otherwise noted.      Objective           Vitals:  No vitals were obtained today due to virtual visit.    Physical Exam   GENERAL: Healthy, alert and no distress  EYES: Eyes grossly normal to inspection.  No discharge or erythema, or obvious scleral/conjunctival abnormalities.  RESP: No audible wheeze, cough, or visible cyanosis.  No visible retractions or increased work of breathing.    SKIN: Visible skin clear. No significant rash, abnormal pigmentation or lesions.  PSYCH: Mentation appears normal, affect normal/bright, judgement and insight intact, normal speech and appearance well-groomed.            Video-Visit  Details    Video Start Time: 5:40 PM    Type of service:  Video Visit    Video End Time:5:48 PM    Originating Location (pt. Location): Home    Distant Location (provider location):  [facility]     Platform used for Video Visit: [employer]

## 2022-11-19 ENCOUNTER — HEALTH MAINTENANCE LETTER (OUTPATIENT)
Age: 37
End: 2022-11-19

## 2022-12-11 ENCOUNTER — MYC MEDICAL ADVICE (OUTPATIENT)
Dept: FAMILY MEDICINE | Facility: CLINIC | Age: 37
End: 2022-12-11

## 2022-12-11 DIAGNOSIS — J11.1 INFLUENZA: Primary | ICD-10-CM

## 2022-12-13 ENCOUNTER — TELEPHONE (OUTPATIENT)
Dept: FAMILY MEDICINE | Facility: CLINIC | Age: 37
End: 2022-12-13

## 2022-12-15 RX ORDER — OSELTAMIVIR PHOSPHATE 75 MG/1
75 CAPSULE ORAL DAILY
Qty: 7 CAPSULE | Refills: 0 | Status: SHIPPED | OUTPATIENT
Start: 2022-12-15 | End: 2022-12-22

## 2022-12-15 NOTE — TELEPHONE ENCOUNTER
Received msg from ORALIA Hinton through Teams that she reviewed the msg & ordered the tamiflu.  Pt was called & updated.  Pt will  taina Corbett RN

## 2022-12-15 NOTE — TELEPHONE ENCOUNTER
Influenza-Like Illness (LEANNE) RN Standing Order (13+)    Mary Alice Jordan      Age: 37 year old     YOB: 1985    Patient has been triaged using Epic triage guidelines: Patient does not need higher level of care   pts only reported sx is achiness started today.  Has 2 children in household that tested positive for inluenza A (one was positive as of this morning)  Has the patient been seen at a [facility] or [facility] (established in primary or specialty care) in the last two years? Yes     Do any of the following exclusions apply to the patient?    Does the patient have a history of CrCl less than or equal to 60 ml/min in the previous 12 months?   Last GFR >90 & was dated 10/8/21   Is the patient taking Probenecid?     (Probencecid & Tamiflu together are not recommended) No   Patient reports a positive Covid-19 test:     (Encourage at home COVID-19 test or place PCR order per standing order) No     Reported Tamiflu allergy or intolerance    No     Does this patient have ANY of the above exclusions answered Yes?  No.     Does the patient have symptoms or been exposed to a confirmed case of influenza?  Exposed- patient has been exposed to a confirmed case of influenza within 5 days.       Due to Exposure, does the patient have ANY of these high risk conditions?   Younger than 5 years of age or age 65 and older No   Chronic pulmonary disease such as asthma or COPD No   Heart disease (CHF, CAD, anticoagulation d/t arrhytmia, congenital heart anomaly) *HTN alone is excluded No   Kidney disease insufficiency  No   Hepatic or Hematologic disorder (e.g. chronic liver disease patient, sickle cell disease) No   Diabetes (Type 1 or Type 2) Yes   Neurologic and Neurodevelopment Conditions (including disorders of the brain, spinal cord, peripheral nerve, and muscle, such as cerebral palsy, epilepsy (seizure disorders), stroke, intellectual disability, moderate to severe developmental delay, muscular  dystrophy, or spinal cord injury) No   Obese with BMI >40 No   Is pregnant, may be pregnant, or is within two weeks after delivery No   Is a resident of a chronic care facility No   Is the patient considered a non- Black,  or , or  or  racial or ethnic minority group? No   Is <19 years old and is receiving long term aspirin- or salicylate-containing medications No   Patient has a metabolic disorder Yes   Patient has had chemotherapy or radiation within the last 3 months No   Patient has had an organ or bone marrow transplant No   Immunosuppression: Caused by medication such as those taking prednisone in excess of 20mg daily No   Immunosuppression: Congenital or acquired immunodeficiencies including HIV/AIDS No   Immunosuppression: Asplenia No     Does this patient have ANY of the above conditions?   Yes     Wt Readings from Last 1 Encounters:   08/30/22 111.6 kg (246 lb)       Is weight documented in the last 12 months?  Yes, age >1 Year     RECOMMENDATION:  This patient may benefit from an order of Tamiflu for treatment of LEANNE exposure per the standing order.    Inform patient: Your symptoms and exposure are consistent with influenza. If your symptoms progress despite oseltamivir, or if you have concerns about the presence fo another infection including coronavirus, please contact your care provider by virtual visit.      Use SmartSet Influenza Antiviral Treatment (Exposure) QD to find suggested Tamiflu order.     AGE AND DOSING TABLE FOR EXPOSURE TO INFLUENZA:  Adult or Pediatric Patients >40kg (> 88.1lbs)    Oseltamivir 75mg by mouth once a day for 7 days        Pediatric Dosing by Weight    If 3 to 12 months old Oral Oseltamivir (Tamiflu) 3mg/kg/dose once daily for 7 days; max dosage 30 mg daily   If 9 to 12 months old Oral Oseltamivir (Tamiflu) 3.5mg/kg/dose once daily for 7 days; max dosage 30mg daily   If > 12 months or older and:    15 kg or less (33lbs or  less) Oral Oseltamivir (Tamiflu) 30mg once a day for 7 days   > 15 kg to 23 kg (> 33lbs to 50.7lbs) Oral Oseltamivir (Tamiflu) 45mg once a day for 7 days   > 23 kg to 40 kg  (> 50.7lbs to 88.1lbs) Oral Oseltamivir (Tamiflu) 60mg once a day for 7 days   > 40 kg (> 88.1lbs) Oral Oseltamivir (Tamiflu) 75mg once a day for 7 days                       Additional educational resources include:    http://www.[employer].com    http://www.cdc.gov/flu/  Dacia Corbett RN

## 2022-12-15 NOTE — TELEPHONE ENCOUNTER
Dr Escobar,    Routing influenza protocol pt to provider as last lab work was done 10/8/2021 (14 mos ago). This is just over the 12 mos link.   Ok to send Rx for tamiflu based on this? Pt meets criteria otherwise.    Sophy Corbett RN

## 2022-12-18 ENCOUNTER — HEALTH MAINTENANCE LETTER (OUTPATIENT)
Age: 37
End: 2022-12-18

## 2023-02-06 ENCOUNTER — OFFICE VISIT (OUTPATIENT)
Dept: FAMILY MEDICINE | Facility: CLINIC | Age: 38
End: 2023-02-06
Payer: COMMERCIAL

## 2023-02-06 VITALS
TEMPERATURE: 99.4 F | DIASTOLIC BLOOD PRESSURE: 86 MMHG | OXYGEN SATURATION: 98 % | RESPIRATION RATE: 16 BRPM | SYSTOLIC BLOOD PRESSURE: 148 MMHG | BODY MASS INDEX: 45.72 KG/M2 | HEART RATE: 104 BPM | WEIGHT: 267.8 LBS | HEIGHT: 64 IN

## 2023-02-06 DIAGNOSIS — E11.9 TYPE 2 DIABETES MELLITUS WITHOUT COMPLICATION, UNSPECIFIED WHETHER LONG TERM INSULIN USE (H): ICD-10-CM

## 2023-02-06 DIAGNOSIS — R42 DIZZINESS: ICD-10-CM

## 2023-02-06 DIAGNOSIS — R03.0 PREHYPERTENSION: ICD-10-CM

## 2023-02-06 DIAGNOSIS — Z00.00 ANNUAL PHYSICAL EXAM: Primary | ICD-10-CM

## 2023-02-06 PROCEDURE — 90471 IMMUNIZATION ADMIN: CPT | Performed by: NURSE PRACTITIONER

## 2023-02-06 PROCEDURE — 90677 PCV20 VACCINE IM: CPT | Performed by: NURSE PRACTITIONER

## 2023-02-06 PROCEDURE — 99214 OFFICE O/P EST MOD 30 MIN: CPT | Mod: 25 | Performed by: NURSE PRACTITIONER

## 2023-02-06 PROCEDURE — 99395 PREV VISIT EST AGE 18-39: CPT | Mod: 25 | Performed by: NURSE PRACTITIONER

## 2023-02-06 ASSESSMENT — ENCOUNTER SYMPTOMS
HEARTBURN: 1
BREAST MASS: 0
NERVOUS/ANXIOUS: 1
DIZZINESS: 1

## 2023-02-06 ASSESSMENT — PATIENT HEALTH QUESTIONNAIRE - PHQ9
SUM OF ALL RESPONSES TO PHQ QUESTIONS 1-9: 2
SUM OF ALL RESPONSES TO PHQ QUESTIONS 1-9: 2
10. IF YOU CHECKED OFF ANY PROBLEMS, HOW DIFFICULT HAVE THESE PROBLEMS MADE IT FOR YOU TO DO YOUR WORK, TAKE CARE OF THINGS AT HOME, OR GET ALONG WITH OTHER PEOPLE: NOT DIFFICULT AT ALL

## 2023-02-06 ASSESSMENT — PAIN SCALES - GENERAL: PAINLEVEL: NO PAIN (0)

## 2023-02-06 NOTE — PROGRESS NOTES
SUBJECTIVE:   CC: Mary Alice Jordan is an 37 year old who presents for preventive health visit. Patient has been advised of split billing requirements and indicates understanding: Yes  Healthy Habits:     Getting at least 3 servings of Calcium per day:  Yes    Bi-annual eye exam:  NO    Dental care twice a year:  Yes    Sleep apnea or symptoms of sleep apnea:  Daytime drowsiness    Diet:  Diabetic and Carbohydrate counting    Frequency of exercise:  2-3 days/week    Duration of exercise:  15-30 minutes    Taking medications regularly:  Yes    PHQ-2 Total Score: 0    Additional concerns today:  Yes (She has been experiencing dizziness for the past couple of months. )    Today's PHQ-2 Score:   PHQ-2 ( 1999 [employer]) 2/6/2023   Q1: Little interest or pleasure in doing things 0   Q2: Feeling down, depressed or hopeless 0   PHQ-2 Score 0   PHQ-2 Total Score (12-17 Years)- Positive if 3 or more points; Administer PHQ-A if positive -   Q1: Little interest or pleasure in doing things Nearly every day   Q2: Feeling down, depressed or hopeless Not at all   PHQ-2 Score 3     Have you ever done Advance Care Planning? (For example, a Health Directive, POLST, or a discussion with a medical provider or your loved ones about your wishes): No, advance care planning information given to patient to review.  Patient declined advance care planning discussion at this time.    Social History     Tobacco Use     Smoking status: Former     Packs/day: 0.00     Types: Cigarettes     Smokeless tobacco: Never   Substance Use Topics     Alcohol use: Not Currently     Comment: quit with pregnancy     If you drink alcohol do you typically have >3 drinks per day or >7 drinks per week? No    Alcohol Use 2/6/2023   Prescreen: >3 drinks/day or >7 drinks/week? No   Prescreen: >3 drinks/day or >7 drinks/week? -       Reviewed orders with patient.  Reviewed health maintenance and updated orders accordingly - Yes  Lab work is in process  Labs reviewed in  EPIC  BP Readings from Last 3 Encounters:   02/06/23 (!) 148/86   08/30/22 134/82   06/17/22 122/78    Wt Readings from Last 3 Encounters:   02/06/23 121.5 kg (267 lb 12.8 oz)   08/30/22 111.6 kg (246 lb)   06/17/22 111.8 kg (246 lb 6.4 oz)                    Breast Cancer Screening:  Answers for HPI/ROS submitted by the patient on 2/6/2023  If you checked off any problems, how difficult have these problems made it for you to do your work, take care of things at home, or get along with other people?: Not difficult at all  PHQ9 TOTAL SCORE: 2      FHS-7:   Breast CA Risk Assessment (FHS-7) 2/6/2023   Did any of your first-degree relatives have breast or ovarian cancer? Yes   Did any of your relatives have bilateral breast cancer? No   Did any man in your family have breast cancer? No   Did any woman in your family have breast and ovarian cancer? Yes   Did any woman in your family have breast cancer before age 50 y? No   Do you have 2 or more relatives with breast and/or ovarian cancer? No   Do you have 2 or more relatives with breast and/or bowel cancer? No     click delete button to remove this line now  Patient under 40 years of age: Routine Mammogram Screening not recommended.   Pertinent mammograms are reviewed under the imaging tab.    History of abnormal Pap smear: NO - age 30- 65 PAP every 3 years recommended  PAP / HPV Latest Ref Rng & Units 3/2/2021 10/6/2015   PAP (Historical) - NIL NIL   HPV16 NEG:Negative Negative Negative   HPV18 NEG:Negative Negative Negative   HRHPV NEG:Negative Negative Negative     Reviewed and updated as needed this visit by clinical staff   Tobacco  Allergies  Meds  Problems             Reviewed and updated as needed this visit by Provider      Problems              Review of Systems   Gastrointestinal: Positive for heartburn.   Breasts:  Negative for tenderness, breast mass and discharge.   Genitourinary: Negative for pelvic pain, vaginal bleeding and vaginal discharge.  "  Neurological: Positive for dizziness.   Psychiatric/Behavioral: The patient is nervous/anxious.           OBJECTIVE:   BP (!) 148/86 (BP Location: Left arm, Patient Position: Chair, Cuff Size: Adult Large)   Pulse 104   Temp 99.4  F (37.4  C) (Tympanic)   Resp 16   Ht 1.626 m (5' 4\")   Wt 121.5 kg (267 lb 12.8 oz)   SpO2 98%   BMI 45.97 kg/m    Physical Exam  GENERAL: healthy, alert and no distress  EYES: Eyes grossly normal to inspection, PERRL and conjunctivae and sclerae normal  HENT: ear canals and TM's normal, nose and mouth without ulcers or lesions  NECK: no adenopathy, no asymmetry, masses, or scars and thyroid normal to palpation  RESP: lungs clear to auscultation - no rales, rhonchi or wheezes  CV: regular rate and rhythm, normal S1 S2, no S3 or S4, no murmur, click or rub, no peripheral edema and peripheral pulses strong  MS: no gross musculoskeletal defects noted, no edema  SKIN: no suspicious lesions or rashes  NEURO: Normal strength and tone, mentation intact and speech normal  PSYCH: mentation appears normal, affect normal/bright    Diagnostic Test Results:  Labs reviewed in Epic    ASSESSMENT/PLAN:   (Z00.00) Annual physical exam  (primary encounter diagnosis)  Comment: exam normal, patient will schedule fasting lab appointment   Plan: Comprehensive metabolic panel (BMP + Alb, Alk         Phos, ALT, AST, Total. Bili, TP), Lipid panel         reflex to direct LDL Fasting            (E11.9) Type 2 diabetes mellitus without complication, unspecified whether long term insulin use (H)  Comment: due for labs  Plan: Comprehensive metabolic panel (BMP + Alb, Alk         Phos, ALT, AST, Total. Bili, TP), Hemoglobin         A1c            (R42) Dizziness  Comment: patient's was BP elevated during visit today, she also noted that at home her BP running slightly high, but was still within normal range, around 130'/80'. She noted that dizziness improved since her stress level improved, she was worry " "about health of her son and now he is doing better. I advised patient that dizziness can be due to elevated BP, recommended monitoring, follow up with RN for BP recheck in 1-2 weeks and bring home BP monitor  Follow healthy diet, try to work on weight loss and exercise daily   Plan: Comprehensive metabolic panel (BMP + Alb, Alk         Phos, ALT, AST, Total. Bili, TP), CBC with         platelets and differential            (R03.0) Prehypertension  Comment: recommended monitoring, BP was elevated during visit today, patient will schedule follow up appointment with RN for BP recheck in 1-2 weeks       COUNSELING:  Reviewed preventive health counseling, as reflected in patient instructions       Regular exercise       Healthy diet/nutrition      BMI:   Estimated body mass index is 45.97 kg/m  as calculated from the following:    Height as of this encounter: 1.626 m (5' 4\").    Weight as of this encounter: 121.5 kg (267 lb 12.8 oz).   Weight management plan: Discussed healthy diet and exercise guidelines      She reports that she has quit smoking. Her smoking use included cigarettes. She has never used smokeless tobacco.      JAMESON Jonas CNP  [facility]  "

## 2023-02-07 ENCOUNTER — MYC MEDICAL ADVICE (OUTPATIENT)
Dept: FAMILY MEDICINE | Facility: CLINIC | Age: 38
End: 2023-02-07

## 2023-02-07 ENCOUNTER — LAB (OUTPATIENT)
Dept: LAB | Facility: CLINIC | Age: 38
End: 2023-02-07
Payer: COMMERCIAL

## 2023-02-07 DIAGNOSIS — E11.9 TYPE 2 DIABETES MELLITUS WITHOUT COMPLICATION, UNSPECIFIED WHETHER LONG TERM INSULIN USE (H): ICD-10-CM

## 2023-02-07 DIAGNOSIS — R53.83 FATIGUE, UNSPECIFIED TYPE: Primary | ICD-10-CM

## 2023-02-07 DIAGNOSIS — R42 DIZZINESS: ICD-10-CM

## 2023-02-07 DIAGNOSIS — R53.83 FATIGUE, UNSPECIFIED TYPE: ICD-10-CM

## 2023-02-07 DIAGNOSIS — Z00.00 ANNUAL PHYSICAL EXAM: ICD-10-CM

## 2023-02-07 LAB
ALBUMIN SERPL BCG-MCNC: 4.1 G/DL (ref 3.5–5.2)
ALP SERPL-CCNC: 58 U/L (ref 35–104)
ALT SERPL W P-5'-P-CCNC: 22 U/L (ref 10–35)
ANION GAP SERPL CALCULATED.3IONS-SCNC: 12 MMOL/L (ref 7–15)
AST SERPL W P-5'-P-CCNC: 19 U/L (ref 10–35)
BASOPHILS # BLD AUTO: 0 10E3/UL (ref 0–0.2)
BASOPHILS NFR BLD AUTO: 0 %
BILIRUB SERPL-MCNC: 0.5 MG/DL
BUN SERPL-MCNC: 12.8 MG/DL (ref 6–20)
CALCIUM SERPL-MCNC: 9.2 MG/DL (ref 8.6–10)
CHLORIDE SERPL-SCNC: 102 MMOL/L (ref 98–107)
CHOLEST SERPL-MCNC: 146 MG/DL
CREAT SERPL-MCNC: 0.55 MG/DL (ref 0.51–0.95)
DEPRECATED HCO3 PLAS-SCNC: 25 MMOL/L (ref 22–29)
EOSINOPHIL # BLD AUTO: 0.4 10E3/UL (ref 0–0.7)
EOSINOPHIL NFR BLD AUTO: 4 %
ERYTHROCYTE [DISTWIDTH] IN BLOOD BY AUTOMATED COUNT: 14.6 % (ref 10–15)
GFR SERPL CREATININE-BSD FRML MDRD: >90 ML/MIN/1.73M2
GLUCOSE SERPL-MCNC: 111 MG/DL (ref 70–99)
HBA1C MFR BLD: 6.8 % (ref 0–5.6)
HCT VFR BLD AUTO: 38.5 % (ref 35–47)
HDLC SERPL-MCNC: 52 MG/DL
HGB BLD-MCNC: 12 G/DL (ref 11.7–15.7)
LDLC SERPL CALC-MCNC: 75 MG/DL
LYMPHOCYTES # BLD AUTO: 1.9 10E3/UL (ref 0.8–5.3)
LYMPHOCYTES NFR BLD AUTO: 20 %
MCH RBC QN AUTO: 24.9 PG (ref 26.5–33)
MCHC RBC AUTO-ENTMCNC: 31.2 G/DL (ref 31.5–36.5)
MCV RBC AUTO: 80 FL (ref 78–100)
MONOCYTES # BLD AUTO: 0.6 10E3/UL (ref 0–1.3)
MONOCYTES NFR BLD AUTO: 7 %
NEUTROPHILS # BLD AUTO: 6.4 10E3/UL (ref 1.6–8.3)
NEUTROPHILS NFR BLD AUTO: 69 %
NONHDLC SERPL-MCNC: 94 MG/DL
PLATELET # BLD AUTO: 272 10E3/UL (ref 150–450)
POTASSIUM SERPL-SCNC: 4.1 MMOL/L (ref 3.4–5.3)
PROT SERPL-MCNC: 7.8 G/DL (ref 6.4–8.3)
RBC # BLD AUTO: 4.81 10E6/UL (ref 3.8–5.2)
SODIUM SERPL-SCNC: 139 MMOL/L (ref 136–145)
TRIGL SERPL-MCNC: 95 MG/DL
WBC # BLD AUTO: 9.3 10E3/UL (ref 4–11)

## 2023-02-07 PROCEDURE — 80053 COMPREHEN METABOLIC PANEL: CPT

## 2023-02-07 PROCEDURE — 80061 LIPID PANEL: CPT

## 2023-02-07 PROCEDURE — 83036 HEMOGLOBIN GLYCOSYLATED A1C: CPT

## 2023-02-07 PROCEDURE — 85025 COMPLETE CBC W/AUTO DIFF WBC: CPT

## 2023-02-07 PROCEDURE — 82728 ASSAY OF FERRITIN: CPT

## 2023-02-07 PROCEDURE — 36415 COLL VENOUS BLD VENIPUNCTURE: CPT

## 2023-02-08 LAB — FERRITIN SERPL-MCNC: 44 NG/ML (ref 6–175)

## 2023-02-08 NOTE — TELEPHONE ENCOUNTER
Vanessa  Pt sends mychart asking if low MCH could be causing her symptoms of dizziness        Jeremiah Smith RN

## 2023-03-03 ENCOUNTER — ALLIED HEALTH/NURSE VISIT (OUTPATIENT)
Dept: FAMILY MEDICINE | Facility: CLINIC | Age: 38
End: 2023-03-03
Payer: COMMERCIAL

## 2023-03-03 VITALS — HEART RATE: 80 BPM | SYSTOLIC BLOOD PRESSURE: 142 MMHG | DIASTOLIC BLOOD PRESSURE: 84 MMHG | RESPIRATION RATE: 16 BRPM

## 2023-03-03 DIAGNOSIS — Z01.30 BP CHECK: Primary | ICD-10-CM

## 2023-03-03 PROCEDURE — 99207 PR NO CHARGE NURSE ONLY: CPT

## 2023-03-03 NOTE — PROGRESS NOTES
Pt presents for a blood pressure recheck at the request of Vanessa Izaguirre.  Initial pressure 140/84 HR 80  Repeat pressure 142/84    Pt also brought home cuff in to see if readings were similar and they were noted to be within 5-10mm range of writers auscultated readings.  Reviewed education on modifiable factors such as diet and exercise.  Pt will await further instructions from provider.     Routing to provider for review/direction    Diane Torres RN MSN

## 2023-03-08 ENCOUNTER — MYC MEDICAL ADVICE (OUTPATIENT)
Dept: FAMILY MEDICINE | Facility: CLINIC | Age: 38
End: 2023-03-08
Payer: COMMERCIAL

## 2023-03-08 ENCOUNTER — TELEPHONE (OUTPATIENT)
Dept: FAMILY MEDICINE | Facility: CLINIC | Age: 38
End: 2023-03-08
Payer: COMMERCIAL

## 2023-03-08 DIAGNOSIS — I10 BENIGN ESSENTIAL HYPERTENSION: Primary | ICD-10-CM

## 2023-03-08 RX ORDER — LOSARTAN POTASSIUM 25 MG/1
25 TABLET ORAL DAILY
Qty: 30 TABLET | Refills: 1 | Status: SHIPPED | OUTPATIENT
Start: 2023-03-08 | End: 2023-09-11

## 2023-03-08 NOTE — TELEPHONE ENCOUNTER
BP still elevated, recommend to start Losartan, low dose 25 mg daily. Please check with patient if she is breastfeeding, I do not remember if she does, but lactation is still flagging in her chart?  If she does I will need to prescribe different BP medication.      Follow up with me, or any provider in 2-3 weeks for BP recheck     JAMESON Jonas CNP

## 2023-04-03 ENCOUNTER — OFFICE VISIT (OUTPATIENT)
Dept: PODIATRY | Facility: CLINIC | Age: 38
End: 2023-04-03
Payer: COMMERCIAL

## 2023-04-03 VITALS
WEIGHT: 267 LBS | SYSTOLIC BLOOD PRESSURE: 135 MMHG | HEIGHT: 64 IN | BODY MASS INDEX: 45.58 KG/M2 | DIASTOLIC BLOOD PRESSURE: 85 MMHG | HEART RATE: 99 BPM

## 2023-04-03 DIAGNOSIS — M77.51 CAPSULITIS OF METATARSOPHALANGEAL (MTP) JOINT OF RIGHT FOOT: ICD-10-CM

## 2023-04-03 DIAGNOSIS — M76.71 PERONEAL TENDINITIS OF RIGHT LOWER LEG: Primary | ICD-10-CM

## 2023-04-03 PROCEDURE — 99203 OFFICE O/P NEW LOW 30 MIN: CPT | Performed by: PODIATRIST

## 2023-04-03 ASSESSMENT — PAIN SCALES - GENERAL: PAINLEVEL: MILD PAIN (3)

## 2023-04-03 NOTE — NURSING NOTE
"Chief Complaint   Patient presents with     Consult     Right foot pain       Initial /85   Pulse 99   Ht 1.626 m (5' 4\")   Wt 121.1 kg (267 lb)   BMI 45.83 kg/m   Estimated body mass index is 45.83 kg/m  as calculated from the following:    Height as of this encounter: 1.626 m (5' 4\").    Weight as of this encounter: 121.1 kg (267 lb).  Medications and allergies reviewed.      Nicole BOURNE MA    "

## 2023-04-03 NOTE — PROGRESS NOTES
"PATIENT HISTORY:  Mary Alice Jordan is a 37 year old female who presents to clinic as a self referral with a chief complaint of right foot pain.  The patient is seen by themselves.  The patient relates the pain is primarily located around the lateral side of foot with a hard lump and the bottom ball of foot.  Reports insidious onset without acute precipitating event. that has been going on for 2 year(s).  The patient has previously tried ice with little relief.  Denies any prior history of similar issues..  The patient is currently employed as .  Any previous notes and studies that pertain to the patient's condition were reviewed.    Pertinent medical, surgical and family history was reviewed in the [facility] chart.    Past Medical History:   Past Medical History:   Diagnosis Date     Anxiety      Chickenpox      Diabetes (H) 2015     Irritable bowel syndrome (IBS)        Medications:   Current Outpatient Medications:      blood glucose (NO BRAND SPECIFIED) lancets standard, Use to test blood sugar 4 times daily or as directed., Disp: 100 each, Rfl: 3     losartan (COZAAR) 25 MG tablet, Take 1 tablet (25 mg) by mouth daily, Disp: 30 tablet, Rfl: 1     VITAMIN D PO, , Disp: , Rfl:      Allergies:  No Known Allergies    Vitals: /85   Pulse 99   Ht 1.626 m (5' 4\")   Wt 121.1 kg (267 lb)   BMI 45.83 kg/m    BMI= Body mass index is 45.83 kg/m .    LOWER EXTREMITY PHYSICAL EXAM    Dermatologic: Skin is intact to right lower extremity without significant lesions, rash or abrasion.        Vascular: DP & PT pulses are intact & regular on the right.   CFT and skin temperature is normal to the right lower extremity.     Neurologic: Lower extremity sensation is intact to light touch.  No evidence of weakness in the right lower extremity.        Musculoskeletal: Patient is ambulatory without assistive device or brace.  No gross ankle deformity noted.  No foot or ankle joint effusion is noted.  Noted pain " on palpation along the peroneal tendons on the lateral aspect of the right foot and ankle.  Noted edema along the course of the tendons.  Noted pain on palpation under the lesser metatarsophalangeal joints on the right foot.         ASSESSMENT / PLAN:     ICD-10-CM    1. Peroneal tendinitis of right lower leg  M76.71 Ankle/Foot Bracing Supplies DME Foot Insert; Right      2. Capsulitis of metatarsophalangeal (MTP) joint of right foot  M77.51 Ankle/Foot Bracing Supplies DME Foot Insert; Right          I have explained to Mary Alice about the conditions.  We discussed the underlying contributing factors to the condition as well as treatment options along with expected length of recovery.  At this time, the patient was educated on the importance of offloading supportive shoes and other devices.  I demonstrated to the patient calf stretches to perform every hour daily until symptoms resolve.  After symptoms resolve, the patient was advised to perform the stretches 3 times daily to prevent future recurrence.  The patient was instructed to perform warm soaks with Epson salt after which to also apply over-the-counter Voltaren gel to deeply massage the injured tissue.  The patient was instructed to do this on a daily basis until symptoms resolve.  The patient was fitted with a Dynaflex insert that will aid in offloading the tension forces to the soft tissues and prevent further inflammation.  The patient may return in four weeks for reevaluation to determine if any further treatment will be needed.        Mary Alice verbalized agreement with and understanding of the rational for the diagnosis and treatment plan.  All questions were answered to best of my ability and the patient's satisfaction. The patient was advised to contact the clinic with any questions that may arise after the clinic visit.      Disclaimer: This note consists of symbols derived from keyboarding, dictation and/or voice recognition software. As a result, there  may be errors in the script that have gone undetected. Please consider this when interpreting information found in this chart.       KRISTIAN Schulz D.P.M., [email or web address].

## 2023-04-03 NOTE — LETTER
"    4/3/2023         RE: Mary Alice Jordan  [id number]   [place] [id number]        Dear Colleague,    Thank you for referring your patient, Mary Alice Jordan, to the [facility]. Please see a copy of my visit note below.    PATIENT HISTORY:  Mary Alice Jordan is a 37 year old female who presents to clinic as a self referral with a chief complaint of right foot pain.  The patient is seen by themselves.  The patient relates the pain is primarily located around the lateral side of foot with a hard lump and the bottom ball of foot.  Reports insidious onset without acute precipitating event. that has been going on for 2 year(s).  The patient has previously tried ice with little relief.  Denies any prior history of similar issues..  The patient is currently employed as .  Any previous notes and studies that pertain to the patient's condition were reviewed.    Pertinent medical, surgical and family history was reviewed in the [facility] chart.    Past Medical History:   Past Medical History:   Diagnosis Date     Anxiety      Chickenpox      Diabetes (H) 2015     Irritable bowel syndrome (IBS)        Medications:   Current Outpatient Medications:      blood glucose (NO BRAND SPECIFIED) lancets standard, Use to test blood sugar 4 times daily or as directed., Disp: 100 each, Rfl: 3     losartan (COZAAR) 25 MG tablet, Take 1 tablet (25 mg) by mouth daily, Disp: 30 tablet, Rfl: 1     VITAMIN D PO, , Disp: , Rfl:      Allergies:  No Known Allergies    Vitals: /85   Pulse 99   Ht 1.626 m (5' 4\")   Wt 121.1 kg (267 lb)   BMI 45.83 kg/m    BMI= Body mass index is 45.83 kg/m .    LOWER EXTREMITY PHYSICAL EXAM    Dermatologic: Skin is intact to right lower extremity without significant lesions, rash or abrasion.        Vascular: DP & PT pulses are intact & regular on the right.   CFT and skin temperature is normal to the right lower extremity.     Neurologic: Lower " extremity sensation is intact to light touch.  No evidence of weakness in the right lower extremity.        Musculoskeletal: Patient is ambulatory without assistive device or brace.  No gross ankle deformity noted.  No foot or ankle joint effusion is noted.  Noted pain on palpation along the peroneal tendons on the lateral aspect of the right foot and ankle.  Noted edema along the course of the tendons.  Noted pain on palpation under the lesser metatarsophalangeal joints on the right foot.         ASSESSMENT / PLAN:     ICD-10-CM    1. Peroneal tendinitis of right lower leg  M76.71 Ankle/Foot Bracing Supplies DME Foot Insert; Right      2. Capsulitis of metatarsophalangeal (MTP) joint of right foot  M77.51 Ankle/Foot Bracing Supplies DME Foot Insert; Right          I have explained to Mary Alice about the conditions.  We discussed the underlying contributing factors to the condition as well as treatment options along with expected length of recovery.  At this time, the patient was educated on the importance of offloading supportive shoes and other devices.  I demonstrated to the patient calf stretches to perform every hour daily until symptoms resolve.  After symptoms resolve, the patient was advised to perform the stretches 3 times daily to prevent future recurrence.  The patient was instructed to perform warm soaks with Epson salt after which to also apply over-the-counter Voltaren gel to deeply massage the injured tissue.  The patient was instructed to do this on a daily basis until symptoms resolve.  The patient was fitted with a Dynaflex insert that will aid in offloading the tension forces to the soft tissues and prevent further inflammation.  The patient may return in four weeks for reevaluation to determine if any further treatment will be needed.        Mary Alice verbalized agreement with and understanding of the rational for the diagnosis and treatment plan.  All questions were answered to best of my ability and  the patient's satisfaction. The patient was advised to contact the clinic with any questions that may arise after the clinic visit.      Disclaimer: This note consists of symbols derived from keyboarding, dictation and/or voice recognition software. As a result, there may be errors in the script that have gone undetected. Please consider this when interpreting information found in this chart.       KRISTIAN Schulz D.P.M., [email or web address].        Again, thank you for allowing me to participate in the care of your patient.        Sincerely,        Sadiq Schulz DPM

## 2023-04-03 NOTE — PATIENT INSTRUCTIONS

## 2023-06-15 ENCOUNTER — MYC MEDICAL ADVICE (OUTPATIENT)
Dept: FAMILY MEDICINE | Facility: CLINIC | Age: 38
End: 2023-06-15
Payer: COMMERCIAL

## 2023-06-15 DIAGNOSIS — J02.9 ACUTE SORE THROAT: Primary | ICD-10-CM

## 2023-06-15 NOTE — TELEPHONE ENCOUNTER
Pt says that she got the message from PCP, and has a lab appt scheduled for tomorrow.    Ashlie Vila RN  [facility]

## 2023-06-15 NOTE — TELEPHONE ENCOUNTER
Can she come in for a strep test? I generally don't like to prescribe antibiotics without a positive test. Order placed.

## 2023-06-16 ENCOUNTER — LAB (OUTPATIENT)
Dept: LAB | Facility: CLINIC | Age: 38
End: 2023-06-16
Attending: FAMILY MEDICINE
Payer: COMMERCIAL

## 2023-06-16 DIAGNOSIS — E11.9 TYPE 2 DIABETES MELLITUS WITHOUT COMPLICATION, UNSPECIFIED WHETHER LONG TERM INSULIN USE (H): Primary | ICD-10-CM

## 2023-06-16 DIAGNOSIS — J02.9 ACUTE SORE THROAT: ICD-10-CM

## 2023-06-16 LAB
DEPRECATED S PYO AG THROAT QL EIA: NEGATIVE
GROUP A STREP BY PCR: NOT DETECTED

## 2023-06-16 PROCEDURE — 87651 STREP A DNA AMP PROBE: CPT

## 2023-06-18 ENCOUNTER — MYC MEDICAL ADVICE (OUTPATIENT)
Dept: FAMILY MEDICINE | Facility: CLINIC | Age: 38
End: 2023-06-18
Payer: COMMERCIAL

## 2023-06-18 DIAGNOSIS — E11.9 TYPE 2 DIABETES MELLITUS WITHOUT COMPLICATIONS (H): Primary | ICD-10-CM

## 2023-06-26 DIAGNOSIS — E66.01 MORBIDLY OBESE (H): Primary | ICD-10-CM

## 2023-06-27 ENCOUNTER — MYC MEDICAL ADVICE (OUTPATIENT)
Dept: FAMILY MEDICINE | Facility: CLINIC | Age: 38
End: 2023-06-27
Payer: COMMERCIAL

## 2023-06-28 ENCOUNTER — MYC MEDICAL ADVICE (OUTPATIENT)
Dept: FAMILY MEDICINE | Facility: CLINIC | Age: 38
End: 2023-06-28
Payer: COMMERCIAL

## 2023-06-28 DIAGNOSIS — E11.9 TYPE 2 DIABETES MELLITUS WITHOUT COMPLICATION, WITHOUT LONG-TERM CURRENT USE OF INSULIN (H): Primary | ICD-10-CM

## 2023-06-29 NOTE — TELEPHONE ENCOUNTER
Routed to provider.  Please see MyChart message from pt asking for order for FreeStyle Zi.    [facility], [place].  Sabrina Flaherty RN

## 2023-07-05 NOTE — TELEPHONE ENCOUNTER
Free style grazyna ordered. I will also recommend a visit by patient to the clinic for medication adjustment and also a diabetic educator visit. Referral placed for that.

## 2023-07-09 ENCOUNTER — NURSE TRIAGE (OUTPATIENT)
Dept: NURSING | Facility: CLINIC | Age: 38
End: 2023-07-09
Payer: COMMERCIAL

## 2023-07-09 NOTE — TELEPHONE ENCOUNTER
Type 2 diabetic. BS 1/2 hr ago= 256. She states it has never been this high before. Not on medication for diabetes.. Appointment already scheduled for 7/13/2023.  She drank water and exercised on her bike. Now FU=166.   She is feeling fine.   She checks her blood sugar 3-4 times per day. She did not wash her hands before doing the test. She wonders if it could have been an inaccurate reading ?  Advised to continue to monitor and record readings. She will discuss later this week at her MD appointment.     Isha Johnson RN Triage Nurse Advisor 4:08 PM 7/9/2023

## 2023-07-18 ENCOUNTER — HOSPITAL ENCOUNTER (OUTPATIENT)
Dept: MAMMOGRAPHY | Facility: CLINIC | Age: 38
Discharge: HOME | End: 2023-07-18
Attending: FAMILY MEDICINE | Admitting: FAMILY MEDICINE
Payer: COMMERCIAL

## 2023-07-18 DIAGNOSIS — Z12.31 VISIT FOR SCREENING MAMMOGRAM: ICD-10-CM

## 2023-07-18 PROCEDURE — 77067 SCR MAMMO BI INCL CAD: CPT

## 2023-07-26 ENCOUNTER — OFFICE VISIT (OUTPATIENT)
Dept: FAMILY MEDICINE | Facility: CLINIC | Age: 38
End: 2023-07-26
Payer: COMMERCIAL

## 2023-07-26 VITALS
BODY MASS INDEX: 45.93 KG/M2 | OXYGEN SATURATION: 98 % | SYSTOLIC BLOOD PRESSURE: 144 MMHG | RESPIRATION RATE: 16 BRPM | WEIGHT: 269 LBS | HEIGHT: 64 IN | TEMPERATURE: 97.7 F | HEART RATE: 83 BPM | DIASTOLIC BLOOD PRESSURE: 92 MMHG

## 2023-07-26 DIAGNOSIS — R51.9 CHRONIC NONINTRACTABLE HEADACHE, UNSPECIFIED HEADACHE TYPE: ICD-10-CM

## 2023-07-26 DIAGNOSIS — I10 BENIGN ESSENTIAL HYPERTENSION: ICD-10-CM

## 2023-07-26 DIAGNOSIS — G89.29 CHRONIC NONINTRACTABLE HEADACHE, UNSPECIFIED HEADACHE TYPE: ICD-10-CM

## 2023-07-26 DIAGNOSIS — E11.9 TYPE 2 DIABETES MELLITUS WITHOUT COMPLICATION, WITHOUT LONG-TERM CURRENT USE OF INSULIN (H): Primary | ICD-10-CM

## 2023-07-26 LAB
CREAT UR-MCNC: 102.9 MG/DL
HBA1C MFR BLD: 6.9 % (ref 0–5.6)
MICROALBUMIN UR-MCNC: 54.6 MG/L
MICROALBUMIN/CREAT UR: 53.06 MG/G CR (ref 0–25)

## 2023-07-26 PROCEDURE — 83036 HEMOGLOBIN GLYCOSYLATED A1C: CPT | Performed by: FAMILY MEDICINE

## 2023-07-26 PROCEDURE — 82570 ASSAY OF URINE CREATININE: CPT | Performed by: FAMILY MEDICINE

## 2023-07-26 PROCEDURE — 36415 COLL VENOUS BLD VENIPUNCTURE: CPT | Performed by: FAMILY MEDICINE

## 2023-07-26 PROCEDURE — 99214 OFFICE O/P EST MOD 30 MIN: CPT | Performed by: FAMILY MEDICINE

## 2023-07-26 PROCEDURE — 82043 UR ALBUMIN QUANTITATIVE: CPT | Performed by: FAMILY MEDICINE

## 2023-07-26 RX ORDER — TOPIRAMATE 25 MG/1
25 TABLET, FILM COATED ORAL 2 TIMES DAILY
Qty: 60 TABLET | Refills: 1 | Status: SHIPPED | OUTPATIENT
Start: 2023-07-26

## 2023-07-26 RX ORDER — SUMATRIPTAN 25 MG/1
25 TABLET, FILM COATED ORAL
Qty: 18 TABLET | Refills: 1 | Status: SHIPPED | OUTPATIENT
Start: 2023-07-26

## 2023-07-26 RX ORDER — AMLODIPINE BESYLATE 2.5 MG/1
2.5 TABLET ORAL DAILY
Qty: 30 TABLET | Refills: 3 | Status: SHIPPED | OUTPATIENT
Start: 2023-07-26 | End: 2023-08-10

## 2023-07-26 ASSESSMENT — ENCOUNTER SYMPTOMS
BACK PAIN: 1
CARDIOVASCULAR NEGATIVE: 1
DIZZINESS: 1
HEMATOLOGIC/LYMPHATIC NEGATIVE: 1
CONSTITUTIONAL NEGATIVE: 1
EYES NEGATIVE: 1
GASTROINTESTINAL NEGATIVE: 1
HEADACHES: 1
RESPIRATORY NEGATIVE: 1
PSYCHIATRIC NEGATIVE: 1

## 2023-07-26 ASSESSMENT — PAIN SCALES - GENERAL: PAINLEVEL: NO PAIN (0)

## 2023-07-26 NOTE — PROGRESS NOTES
Assessment & Plan     (E11.9) Type 2 diabetes mellitus without complication, without long-term current use of insulin (H)  (primary encounter diagnosis)  Comment: A1C went up slightly- recommend looking into medications.she will let us know what is covered by insurance.   Plan: Hemoglobin A1c, Albumin Random Urine         Quantitative with Creat Ratio      (I10) Benign essential hypertension  Comment:Blood pressure slightly above normal. She reports that she gets normal readings at home and sometime she feels lightheaded.recommend that she continue to monitor and she is to restart taking amlodipine.   Plan: amLODIPine (NORVASC) 2.5 MG tablet            (R51.9,  G89.29) Chronic nonintractable headache, unspecified headache type  Comment: discussed in details the two ways of treating headaches, recommend taking the sumatriptan as needed and take the topamax daily.   Plan: SUMAtriptan (IMITREX) 25 MG tablet, topiramate         (TOPAMAX) 25 MG tablet      Review of external notes as documented elsewhere in note  30 minutes spent by me on the date of the encounter doing chart review, interpretation of tests, patient visit, and documentation        FUTURE APPOINTMENTS:       - Follow-up visit as needed.    Jose Escobar MD  [facility] 38 [facility], presenting for the following health issues:  Patient is a 38-year-old female presenting to the clinic for recheck of diabetes and hypertension.  She was diagnosed with type 2 diabetes and spring 2023.  She had gestational diabetes at her last pregnancy.      At her last visit she had an A1c of 6.8.  The plan at the time was her for her to manage this with diet which she has been doing successfully.she has a CGM and she is able to monitor her blood sugars and watch what she wants to eat.  We discussed her choices of medication and she will look into what her insurance will cover.   We also talked about her blood  pressure. She also had gestational hypertension and she was on Nifedipine shortly after her pregnancy. She was also started om losartan by my colleague Vanessa but she cited side effects to the medication so she stopped taking it. She tolerated the nifedipine well when she was taking the medication.     She says she checks her blood pressure at home and it is usually in a good range. She sometimes reports some dizziness and she says she stopped the medication when she started experiencing some dizziness.      Lastly she has been dealing with some severe headaches. She reports that the headache is dull to very sharp. She has 2-3 severe episodes weekly. She reports that she is on her screen a lot.  Patient denies any nausea or vomiting.headaches are usually in her eyes .      Diabetes (Recheck on diabetes.), Back Pain (Discuss about back pain.  States the pain is more in the left buttock area.), Headaches (Has been having headaches.), and Blood Pressure Check (She was feeling dizzy in the Spring and was put on a blood pressure medication.  Blood pressure medication was stopped about 3 weeks after starting due to making her blood pressure lower.  She has been checking at home and it has been 120/74.  Still feeling dizzy about 2-3 times per day.  )        7/26/2023     7:06 AM   Additional Questions   Roomed by Mariel Ashton CMA     History of Present Illness       Back Pain:  She presents for follow up of back pain. Patient's back pain is a new problem.    Original cause of back pain: other  First noticed back pain: more than 1 month ago  Patient feels back pain: comes and goesLocation of back pain:  Left buttock  Description of back pain: gnawing  Back pain spreads: nowhere    Since patient first noticed back pain, pain is: unchanged  Does back pain interfere with her job:  No  On a scale of 1-10 (10 being the worst), patient describes pain as:  4  What makes back pain worse: lying down and sitting   Acupuncture: not  "tried  Acetaminophen: helpful  Activity or exercise: helpful  Chiropractor:  Not tried  Cold: helpful  Heat: helpful  Massage: helpful  Muscle relaxants: not tried  NSAIDS: helpful  Opioids: not tried  Physical Therapy: not tried  Rest: not tried  Steroid Injection: not tried  Stretching: helpful  Surgery: not tried  TENS unit: not tried  Topical pain relievers: not tried  Other healthcare providers patient is seeing for back pain: None    Diabetes:   She presents for follow up of diabetes.  She is checking home blood glucose four or more times daily.   She checks blood glucose before meals and after meals.  Blood glucose is never over 200 and never under 70. She is aware of hypoglycemia symptoms including shakiness.   She is concerned about other.    She is not experiencing numbness or burning in feet, excessive thirst, blurry vision, weight changes or redness, sores or blisters on feet. The patient has not had a diabetic eye exam in the last 12 months.          Headaches:   Since the patient's last clinic visit, headaches are: worsened  The patient is getting headaches:  2 -3 times per week  She is able to do normal daily activities when she has a migraine.  The patient is taking the following rescue/relief medications:  Ibuprofen (Advil, Motrin)   Patient states \"I get some relief\" from the rescue/relief medications.   The patient is taking the following medications to prevent migraines:  No medications to prevent migraines  In the past 4 weeks, the patient has gone to an Urgent Care or Emergency Room 0 times times due to headaches.    She eats 2-3 servings of fruits and vegetables daily.She consumes 0 sweetened beverage(s) daily.She exercises with enough effort to increase her heart rate 20 to 29 minutes per day.  She exercises with enough effort to increase her heart rate 7 days per week.   She is taking medications regularly.             Review of Systems   Constitutional: Negative.    HENT: Negative.   " "  Eyes: Negative.    Respiratory: Negative.     Cardiovascular: Negative.    Gastrointestinal: Negative.    Breasts:  negative.    Genitourinary: Negative.    Musculoskeletal:  Positive for back pain.   Neurological:  Positive for dizziness and headaches.   Hematological: Negative.    Psychiatric/Behavioral: Negative.              Objective    BP (!) 152/94 (BP Location: Right arm, Patient Position: Chair, Cuff Size: Adult Regular)   Pulse 83   Temp 97.7  F (36.5  C) (Tympanic)   Resp 16   Ht 1.632 m (5' 4.25\")   Wt 122 kg (269 lb)   LMP 06/25/2023   SpO2 98%   BMI 45.82 kg/m    Body mass index is 45.82 kg/m .  Physical Exam   GENERAL: healthy, alert and no distress  NECK: no adenopathy, no asymmetry, masses, or scars and thyroid normal to palpation  RESP: lungs clear to auscultation - no rales, rhonchi or wheezes  CV: regular rate and rhythm, normal S1 S2, no S3 or S4, no murmur, click or rub, no peripheral edema and peripheral pulses strong  ABDOMEN: soft, nontender, no hepatosplenomegaly, no masses and bowel sounds normal  MS: no gross musculoskeletal defects noted, no edema  SKIN: no suspicious lesions or rashes  PSYCH: mentation appears normal, affect normal/bright    Results for orders placed or performed in visit on 07/26/23 (from the past 24 hour(s))   Hemoglobin A1c   Result Value Ref Range    Hemoglobin A1C 6.9 (H) 0.0 - 5.6 %                     "

## 2023-08-01 ENCOUNTER — TRANSFERRED RECORDS (OUTPATIENT)
Dept: MULTI SPECIALTY CLINIC | Facility: CLINIC | Age: 38
End: 2023-08-01

## 2023-08-01 LAB — RETINOPATHY: NORMAL

## 2023-08-09 ENCOUNTER — ALLIED HEALTH/NURSE VISIT (OUTPATIENT)
Dept: FAMILY MEDICINE | Facility: CLINIC | Age: 38
End: 2023-08-09
Payer: COMMERCIAL

## 2023-08-09 ENCOUNTER — TELEPHONE (OUTPATIENT)
Dept: FAMILY MEDICINE | Facility: CLINIC | Age: 38
End: 2023-08-09

## 2023-08-09 VITALS — SYSTOLIC BLOOD PRESSURE: 142 MMHG | HEART RATE: 74 BPM | DIASTOLIC BLOOD PRESSURE: 90 MMHG

## 2023-08-09 DIAGNOSIS — I10 BENIGN ESSENTIAL HYPERTENSION: ICD-10-CM

## 2023-08-09 DIAGNOSIS — Z01.30 BLOOD PRESSURE CHECK: Primary | ICD-10-CM

## 2023-08-09 PROCEDURE — 99207 PR NO CHARGE NURSE ONLY: CPT

## 2023-08-09 NOTE — PROGRESS NOTES
Mary Alice Jordan is a 38 year old year old patient who comes in today for a Blood Pressure check because of restarting Amlodipine 2.5 mg daily. Patient las took last night, she takes it at night as it makes her less sleepy. Patient is not taking Losartan 25 mg daily as it made her ill.  Vital Signs as repeated by /86, recheck is 142/90, pulse 74.  Patient is taking medication as prescribed  Patient is tolerating medications well.  Patient is monitoring Blood Pressure at home.  Average readings if yes are 140/70's. Patient brought in home arm cuff today to compare and today's home cuff reading is 134/92, pulse 86, so fairly accurate.  Current complaints: none  Disposition:  will route separate telephone encounter to PCP for review. Patient is working on lifestyle changes and walks daily and eats healthy diet with no added salt.      CHRIS Kruse

## 2023-08-09 NOTE — TELEPHONE ENCOUNTER
Mary Alice Jordan is a 38 year old year old patient who comes in today for a Blood Pressure check because of restarting Amlodipine 2.5 mg daily. Patient las took last night, she takes it at night as it makes her less sleepy. Patient is not taking Losartan 25 mg daily as it made her ill.  Vital Signs as repeated by /86, recheck is 142/90, pulse 74.  Patient is taking medication as prescribed  Patient is tolerating medications well.  Patient is monitoring Blood Pressure at home.  Average readings if yes are 140/70's. Patient brought in home arm cuff today to compare and today's home cuff reading is 134/92, pulse 86, so fairly accurate.  Current complaints: none  Disposition:  will route separate telephone encounter to PCP for review. Patient is working on lifestyle changes and walks daily and eats healthy diet with no added salt.      CHRIS Kruse   No

## 2023-08-10 RX ORDER — AMLODIPINE BESYLATE 2.5 MG/1
5 TABLET ORAL DAILY
Qty: 60 TABLET | Refills: 3 | Status: SHIPPED | OUTPATIENT
Start: 2023-08-10 | End: 2023-12-01

## 2023-08-10 NOTE — TELEPHONE ENCOUNTER
Pt was called & read providers message. Pt verbalized understanding & follow up appt was scheduled on 9/6/23.    Sophy Corbett RN

## 2023-08-10 NOTE — TELEPHONE ENCOUNTER
Blood pressure still elevated. Since she is tolerating the amlodipine I will recommend increasing this to 2 tabs daily . I will fax more to her pharmacy. Can she follow up with me in about a month for blood pressure?

## 2023-08-11 ENCOUNTER — TELEPHONE (OUTPATIENT)
Dept: FAMILY MEDICINE | Facility: CLINIC | Age: 38
End: 2023-08-11

## 2023-08-11 NOTE — TELEPHONE ENCOUNTER
Newly diagnosed with DM is not currently on any oral or injectable diabetic medications.     Spoke with patient who reports she has not had anything to eat since 7 pm yesterday as she has a very sick child she has been taking care of.    Patient realized she has not eaten, checked her blood sugar x 3 just now as she forgot this AM and the reading is 155, patient calling to see what can she eat with an elevated blood sugar reading. Patient denies any s/sx of hyperglycemia. States her readings yesterday were 114-117.    Advised patient to avoid foods high in sugar and CHO, advised patient to eat some food with protein such as chicken now, discussed eating small/mini meals throughout the day to better control blood sugar and patient verbalized good understanding.     Advised patient to call back if readings > 200 and/or if patient symptomatic, she verbalized good understanding.    Update sent to PCP.    Julie Behrendt RN

## 2023-08-11 NOTE — TELEPHONE ENCOUNTER
Symptoms    Describe your symptoms: patient called with concerns about diabetes. Newly diagnosis Mom she was checking her blood sugars today did not eat anything today and her bs  are 155 before eating anything. Denies sx. She is asking if she should eat anything.     Any pain: No    How long have you been having symptoms: today     Preferred Pharmacy:   [id number] - [place], [place] - [address]   [address] [facility] [address]  Phone: [phone] Fax: [phone]      Could we send this information to you in [facility] or would you prefer to receive a phone call?:   Patient would prefer a phone call   Okay to leave a detailed message?: Yes at Home number on file [phone] (home)

## 2023-08-14 ENCOUNTER — MYC MEDICAL ADVICE (OUTPATIENT)
Dept: FAMILY MEDICINE | Facility: CLINIC | Age: 38
End: 2023-08-14
Payer: COMMERCIAL

## 2023-08-20 ENCOUNTER — MYC MEDICAL ADVICE (OUTPATIENT)
Dept: FAMILY MEDICINE | Facility: CLINIC | Age: 38
End: 2023-08-20
Payer: COMMERCIAL

## 2023-09-11 ENCOUNTER — OFFICE VISIT (OUTPATIENT)
Dept: FAMILY MEDICINE | Facility: CLINIC | Age: 38
End: 2023-09-11
Payer: COMMERCIAL

## 2023-09-11 ENCOUNTER — MYC MEDICAL ADVICE (OUTPATIENT)
Dept: FAMILY MEDICINE | Facility: CLINIC | Age: 38
End: 2023-09-11

## 2023-09-11 VITALS
HEART RATE: 86 BPM | HEIGHT: 64 IN | SYSTOLIC BLOOD PRESSURE: 134 MMHG | RESPIRATION RATE: 20 BRPM | BODY MASS INDEX: 45.65 KG/M2 | DIASTOLIC BLOOD PRESSURE: 78 MMHG | OXYGEN SATURATION: 98 % | WEIGHT: 267.4 LBS | TEMPERATURE: 98.3 F

## 2023-09-11 DIAGNOSIS — E11.9 TYPE 2 DIABETES MELLITUS WITHOUT COMPLICATION, UNSPECIFIED WHETHER LONG TERM INSULIN USE (H): Primary | ICD-10-CM

## 2023-09-11 DIAGNOSIS — I10 BENIGN ESSENTIAL HYPERTENSION: ICD-10-CM

## 2023-09-11 DIAGNOSIS — J02.9 SORE THROAT: Primary | ICD-10-CM

## 2023-09-11 DIAGNOSIS — E66.01 MORBIDLY OBESE (H): ICD-10-CM

## 2023-09-11 DIAGNOSIS — F41.9 ANXIETY: ICD-10-CM

## 2023-09-11 PROCEDURE — 96127 BRIEF EMOTIONAL/BEHAV ASSMT: CPT | Mod: 59 | Performed by: FAMILY MEDICINE

## 2023-09-11 PROCEDURE — 99207 PR FOOT EXAM NO CHARGE: CPT | Performed by: FAMILY MEDICINE

## 2023-09-11 PROCEDURE — 99214 OFFICE O/P EST MOD 30 MIN: CPT | Performed by: FAMILY MEDICINE

## 2023-09-11 RX ORDER — ESCITALOPRAM OXALATE 20 MG/1
20 TABLET ORAL DAILY
Qty: 90 TABLET | Refills: 0 | Status: SHIPPED | OUTPATIENT
Start: 2023-09-11 | End: 2023-12-01

## 2023-09-11 RX ORDER — GLIPIZIDE 5 MG/1
5 TABLET, FILM COATED, EXTENDED RELEASE ORAL DAILY
Qty: 90 TABLET | Refills: 0 | Status: SHIPPED | OUTPATIENT
Start: 2023-09-11 | End: 2024-05-29

## 2023-09-11 RX ORDER — HYDROXYZINE HYDROCHLORIDE 10 MG/1
10 TABLET, FILM COATED ORAL 3 TIMES DAILY PRN
Qty: 90 TABLET | Refills: 1 | Status: SHIPPED | OUTPATIENT
Start: 2023-09-11

## 2023-09-11 ASSESSMENT — ENCOUNTER SYMPTOMS
CONSTITUTIONAL NEGATIVE: 1
ALLERGIC/IMMUNOLOGIC NEGATIVE: 1
SLEEP DISTURBANCE: 1
MUSCULOSKELETAL NEGATIVE: 1
NEUROLOGICAL NEGATIVE: 1
RESPIRATORY NEGATIVE: 1
EYES NEGATIVE: 1
ENDOCRINE NEGATIVE: 1
CARDIOVASCULAR NEGATIVE: 1
NERVOUS/ANXIOUS: 1
GASTROINTESTINAL NEGATIVE: 1

## 2023-09-11 ASSESSMENT — ANXIETY QUESTIONNAIRES
IF YOU CHECKED OFF ANY PROBLEMS ON THIS QUESTIONNAIRE, HOW DIFFICULT HAVE THESE PROBLEMS MADE IT FOR YOU TO DO YOUR WORK, TAKE CARE OF THINGS AT HOME, OR GET ALONG WITH OTHER PEOPLE: SOMEWHAT DIFFICULT
6. BECOMING EASILY ANNOYED OR IRRITABLE: SEVERAL DAYS
2. NOT BEING ABLE TO STOP OR CONTROL WORRYING: NOT AT ALL
4. TROUBLE RELAXING: NEARLY EVERY DAY
GAD7 TOTAL SCORE: 17
GAD7 TOTAL SCORE: 17
5. BEING SO RESTLESS THAT IT IS HARD TO SIT STILL: NOT AT ALL
3. WORRYING TOO MUCH ABOUT DIFFERENT THINGS: NOT AT ALL
1. FEELING NERVOUS, ANXIOUS, OR ON EDGE: NOT AT ALL
GAD7 TOTAL SCORE: 2
1. FEELING NERVOUS, ANXIOUS, OR ON EDGE: NEARLY EVERY DAY
3. WORRYING TOO MUCH ABOUT DIFFERENT THINGS: NEARLY EVERY DAY
6. BECOMING EASILY ANNOYED OR IRRITABLE: SEVERAL DAYS
IF YOU CHECKED OFF ANY PROBLEMS ON THIS QUESTIONNAIRE, HOW DIFFICULT HAVE THESE PROBLEMS MADE IT FOR YOU TO DO YOUR WORK, TAKE CARE OF THINGS AT HOME, OR GET ALONG WITH OTHER PEOPLE: NOT DIFFICULT AT ALL
7. FEELING AFRAID AS IF SOMETHING AWFUL MIGHT HAPPEN: NEARLY EVERY DAY
7. FEELING AFRAID AS IF SOMETHING AWFUL MIGHT HAPPEN: NOT AT ALL
2. NOT BEING ABLE TO STOP OR CONTROL WORRYING: NEARLY EVERY DAY
5. BEING SO RESTLESS THAT IT IS HARD TO SIT STILL: SEVERAL DAYS

## 2023-09-11 ASSESSMENT — PAIN SCALES - GENERAL: PAINLEVEL: NO PAIN (0)

## 2023-09-11 ASSESSMENT — PATIENT HEALTH QUESTIONNAIRE - PHQ9
SUM OF ALL RESPONSES TO PHQ QUESTIONS 1-9: 2
5. POOR APPETITE OR OVEREATING: SEVERAL DAYS

## 2023-09-11 NOTE — PROGRESS NOTES
"  Assessment & Plan     (E11.9) Type 2 diabetes mellitus without complication, unspecified whether long term insulin use (H)  (primary encounter diagnosis)  Comment: Patient is open to taking an oral medication for her diabetes. She does not want metformin because of the possible side effects of diarrhea  Plan: FOOT EXAM, glipiZIDE (GLUCOTROL XL) 5 MG 24 hr         tablet      (F41.9) Anxiety  Comment: Patient has been struggling with anxiety and will like to restart the anxiety medication.   Plan: escitalopram (LEXAPRO) 20 MG tablet,         hydrOXYzine (ATARAX) 10 MG tablet, Adult Mental        Health [facility] Referral      (E66.01) Morbidly obese (H)  Comment :Patient encourage to continue to be active   Plan : As above      (I10) Benign essential hypertension  Comment: Blood pressure appears to be responding to the medication  Plan: recommend lifestyle changes - reduce salt in diet and also weight loss.       30 minutes spent by me on the date of the encounter doing chart review, review of test results, interpretation of tests, patient visit, and documentation        BMI:   Estimated body mass index is 45.54 kg/m  as calculated from the following:    Height as of this encounter: 1.632 m (5' 4.25\").    Weight as of this encounter: 121.3 kg (267 lb 6.4 oz).   Weight management plan: Discussed healthy diet and exercise guidelines    FUTURE APPOINTMENTS:       - Follow-up visit in one month or sooner as needed.    Jose Escobar MD  [facility] 38 [facility], presenting for the following health issues:    Patient is a 38-year-old female presenting to the clinic today to recheck her blood pressure.  She also has type 2 diabetes and is managed is basically with diet.  She is wondering if she can start something for the diabetes and also get some some diabetic education.  She has also been struggling with anxiety she was on medication before she got pregnant " and stopped taking the medication when she was pregnant.  She reports lately the anxiety has been really bad and she is having panic attacks.  Her  works mainly out of town and travels a lot for work and when he is struggling on the road she is very anxious.  She also recently found that her dad is not her Ken biological dad and she has been very stressed about this.  I also recommended some therapy for her and she is open to this.  She would like to restart the anxiety medication.  Recheck Medication, Hypertension, and Anxiety        9/11/2023    10:32 AM   Additional Questions   Roomed by Beth HARGROVE LPN   Accompanied by self         9/11/2023    10:32 AM   Patient Reported Additional Medications   Patient reports taking the following new medications no new meds       History of Present Illness       Mental Health Follow-up:  Patient presents to follow-up on Anxiety.    Patient's anxiety since last visit has been:  Worse  The patient is not having other symptoms associated with anxiety.  Any significant life events: No  Patient is feeling anxious or having panic attacks.  Patient has no concerns about alcohol or drug use.    Hypertension: She presents for follow up of hypertension.  She does check blood pressure  regularly outside of the clinic. Outpatient blood pressures have not been over 140/90. She follows a low salt diet.     She eats 2-3 servings of fruits and vegetables daily.She consumes 0 sweetened beverage(s) daily.She exercises with enough effort to increase her heart rate 30 to 60 minutes per day.  She exercises with enough effort to increase her heart rate 7 days per week.   She is taking medications regularly.         9/12/2022    11:11 AM 9/11/2023    10:30 AM 9/11/2023    11:15 AM   ROBERT-7 SCORE   Total Score  17 (severe anxiety)    Total Score 17 17 2           9/12/2022    11:11 AM 2/6/2023    12:34 PM 9/11/2023    11:15 AM   PHQ   PHQ-9 Total Score 3 2 2   Q9: Thoughts of better off  "dead/self-harm past 2 weeks Not at all Not at all Not at all            Review of Systems   Constitutional: Negative.    HENT: Negative.     Eyes: Negative.    Respiratory: Negative.     Cardiovascular: Negative.    Gastrointestinal: Negative.    Endocrine: Negative.    Breasts:  negative.    Genitourinary: Negative.    Musculoskeletal: Negative.    Skin: Negative.    Allergic/Immunologic: Negative.    Neurological: Negative.    Psychiatric/Behavioral:  Positive for behavioral problems, mood changes and sleep disturbance. Negative for suicidal ideas. The patient is nervous/anxious.             Objective    /78   Pulse 86   Temp 98.3  F (36.8  C) (Tympanic)   Resp 20   Ht 1.632 m (5' 4.25\")   Wt 121.3 kg (267 lb 6.4 oz)   LMP 08/30/2023 (Exact Date)   SpO2 98%   Breastfeeding No   BMI 45.54 kg/m    Body mass index is 45.54 kg/m .  Physical Exam   GENERAL: healthy, alert and no distress  NECK: no adenopathy, no asymmetry, masses, or scars and thyroid normal to palpation  RESP: lungs clear to auscultation - no rales, rhonchi or wheezes  CV: regular rate and rhythm, normal S1 S2, no S3 or S4, no murmur, click or rub, no peripheral edema and peripheral pulses strong  ABDOMEN: soft, nontender, no hepatosplenomegaly, no masses and bowel sounds normal  MS: no gross musculoskeletal defects noted, no edema  PSYCH: mentation appears normal, affect flat, tearful, anxious, judgement and insight intact, and appearance well groomed  Diabetic foot exam: normal DP and PT pulses, no trophic changes or ulcerative lesions, and normal sensory exam                  "

## 2023-09-12 ENCOUNTER — LAB (OUTPATIENT)
Dept: LAB | Facility: CLINIC | Age: 38
End: 2023-09-12
Attending: FAMILY MEDICINE
Payer: COMMERCIAL

## 2023-09-12 DIAGNOSIS — J02.9 SORE THROAT: ICD-10-CM

## 2023-09-12 LAB
DEPRECATED S PYO AG THROAT QL EIA: NEGATIVE
GROUP A STREP BY PCR: NOT DETECTED

## 2023-09-12 PROCEDURE — 87651 STREP A DNA AMP PROBE: CPT

## 2023-09-13 ENCOUNTER — VIRTUAL VISIT (OUTPATIENT)
Dept: BEHAVIORAL HEALTH | Facility: CLINIC | Age: 38
End: 2023-09-13
Payer: COMMERCIAL

## 2023-09-13 DIAGNOSIS — F41.1 GENERALIZED ANXIETY DISORDER: Primary | ICD-10-CM

## 2023-09-13 PROCEDURE — 90791 PSYCH DIAGNOSTIC EVALUATION: CPT | Mod: 95

## 2023-09-13 ASSESSMENT — PATIENT HEALTH QUESTIONNAIRE - PHQ9
SUM OF ALL RESPONSES TO PHQ QUESTIONS 1-9: 4
SUM OF ALL RESPONSES TO PHQ QUESTIONS 1-9: 4
10. IF YOU CHECKED OFF ANY PROBLEMS, HOW DIFFICULT HAVE THESE PROBLEMS MADE IT FOR YOU TO DO YOUR WORK, TAKE CARE OF THINGS AT HOME, OR GET ALONG WITH OTHER PEOPLE: SOMEWHAT DIFFICULT

## 2023-09-13 ASSESSMENT — COLUMBIA-SUICIDE SEVERITY RATING SCALE - C-SSRS
1. HAVE YOU WISHED YOU WERE DEAD OR WISHED YOU COULD GO TO SLEEP AND NOT WAKE UP?: NO
2. HAVE YOU ACTUALLY HAD ANY THOUGHTS OF KILLING YOURSELF?: NO

## 2023-09-13 NOTE — PROGRESS NOTES
"    [facility] Primary Care: Integrated Behavioral Health    PATIENT'S NAME: Mary Alice Jordan  PREFERRED NAME: Mary Alice  PRONOUNS:       MRN: [id number]  : 1985  ADDRESS: [address]   [place] [address]  [facility]  [id number]  DATE OF SERVICE: 23  START TIME: 12:32pm  END TIME: 1:25pm  PREFERRED PHONE: [phone]  May we leave a program related message: Yes  SERVICE MODALITY:  Video Visit:      Provider verified identity through the following two step process.  Patient provided:  Patient     Telemedicine Visit: The patient's condition can be safely assessed and treated via synchronous audio and visual telemedicine encounter.      Reason for Telemedicine Visit: Patient has requested telehealth visit    Originating Site (Patient Location): Patient's place of employment    Distant Site (Provider Location): [facility] MENTAL [facility] & ADDICTION [facility]    Consent:  The patient/guardian has verbally consented to: the potential risks and benefits of telemedicine (video visit) versus in person care; bill my insurance or make self-payment for services provided; and responsibility for payment of non-covered services.     Patient would like the video invitation sent by:  My Chart    Mode of Communication:  Video Conference via [employer]    Distant Location (Provider):  On-site    As the provider I attest to compliance with applicable laws and regulations related to telemedicine.    UNIVERSAL ADULT Mental Health DIAGNOSTIC ASSESSMENT    Identifying Information:  Patient is a 38 year old,  individual.  Patient was referred for an assessment by self.  Patient attended the session alone.    Chief Complaint:   The reason for seeking services at this time is: \"Anxiety\".  The problem(s) began 23.    Patient has not attempted to resolve these concerns in the past. Patient stated that she has fluctuated with medications over the years and will often " "stop them when she is feeling better and or pregnant.      Social/Family History:  Patient reported they grew up in other [place], [place].  They were raised by biological parents.  Parents were always together.  Patient reported that their childhood was \"really good.\"  She stated that she has two younger siblings, a brother and sister.  Pt stated that her mother stayed at home with the kids and her dad coached sports.  Patient described their current relationships with family of origin as \"good.\"   Pt stated that she found out almost two years ago that her father was not her biological father.   She stated that she does have feelings of hurt and would like to \"respectfully confront parents\" about the adoption and her mother's drug use.     The patient describes their cultural background as .  Cultural influences and impact on patient's life structure, values, norms, and healthcare: Nothing that wouod be of reference.  Contextual influences on patient's health include: Contextual Factors: Family Factors finding out her father is not her biological father and mother's history with substance use .  These factors will be addressed in the Preliminary Treatment plan. Patient identified their preferred language to be English. Patient reported they does not need the assistance of an  or other support involved in therapy.     Patient reported had no significant delays in developmental tasks.   Patient's highest education level was associate degree / vocational certificate.  Patient identified the following learning problems: none reported.  Modifications will not be used to assist communication in therapy.  Patient reports they are able to understand written materials.    Patient reported the following relationship history 18 years with , been  for the last nine years.  Patient identified their sexual orientation as heterosexual.  Patient reported having 3 child(vishal), elly who is 21, sons who " are 16 and two years old.  Patient identified partner; parents; friends as part of their support system.  Patient identified the quality of these relationships as stable and meaningful.      Patient's current living/housing situation involves staying in own home/apartment.  The immediate members of family and household include Angel Jordan, 44, and they report that housing is stable.    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  Patient reports their finances are obtained through employment. Patient does identify finances as a current stressor.      Patient reported that they have not been involved with the legal system.  Patient does not report being under probation/ parole/ jurisdiction.     Patient's Strengths and Limitations:  Patient identified the following strengths or resources that will help them succeed in treatment: commitment to health and well being, family support, insight, intelligence, positive work environment, motivation, sense of humor, strong social skills, and work ethic. Things that may interfere with the patient's success in treatment include: none identified.     Assessments:  The following assessments were completed by patient for this visit:  PHQ9:       4/22/2020    10:58 AM 11/5/2020    12:59 PM 11/8/2021     3:59 PM 9/12/2022    11:11 AM 2/6/2023    12:34 PM 9/11/2023    11:15 AM 9/13/2023    12:34 PM   PHQ-9 SCORE   PHQ-9 Total Score MyChart     2 (Minimal depression)  4 (Minimal depression)   PHQ-9 Total Score 0 1 2 3 2 2 4     GAD7:       3/9/2020     8:10 AM 4/22/2020    10:58 AM 11/5/2020    12:59 PM 11/8/2021     3:59 PM 9/12/2022    11:11 AM 9/11/2023    10:30 AM 9/11/2023    11:15 AM   ROBERT-7 SCORE   Total Score      17 (severe anxiety)    Total Score 15 12 12 7 17 17 2     CAGE-AID:       9/11/2023     2:06 PM   CAGE-AID Total Score   Total Score 0   Total Score MyChart 0 (A total score of 2 or greater is  considered clinically significant)     PROMIS 10-Global Health (only subscores and total score):       9/11/2023     2:05 PM   PROMIS-10 Scores Only   Global Mental Health Score 12   Global Physical Health Score 16   PROMIS TOTAL - SUBSCORES 28     Irene Suicide Severity Rating Scale (Lifetime/Recent)      3/19/2019     3:54 PM 9/13/2023    12:58 PM   Irene Suicide Severity Rating (Lifetime/Recent)   Q1 Wished to be Dead (Past Month) no    Q2 Suicidal Thoughts (Past Month) no    Q6 Suicide Behavior (Lifetime) no    Q1 Wish to be Dead (Lifetime)  N   Q2 Non-Specific Active Suicidal Thoughts (Lifetime)  N     Personal and Family Medical History:  Patient does not report a family history of mental health concerns.  Patient reports family history includes Cancer in her maternal grandfather; Cerebrovascular Disease in her mother; Diabetes in her father and mother; Heart Disease in her father; Hypertension in her mother.. Pt reported that her mother has a history of using marijuana and methamphetamines in the past, but is sober now.     Patient does not report Mental Health Diagnosis or Treatment.      Patient has had a physical exam to rule out medical causes for current symptoms.  Date of last physical exam was greater than a year ago and client was encouraged to schedule an exam with PCP. The patient has a [place] Primary Care Provider, who is named Jose Escobar..  Patient reports no current medical concerns.  Patient denies any issues with pain..   There are not significant appetite / nutritional concerns / weight changes.   Patient does not report a history of head injury / trauma / cognitive impairment.      Patient reports current meds as:   Outpatient Medications Marked as Taking for the 9/13/23 encounter (Virtual Visit) with Myesha Dawkins LICSW   Medication Sig    escitalopram (LEXAPRO) 20 MG tablet Take 1 tablet (20 mg) by mouth daily    hydrOXYzine (ATARAX) 10 MG tablet Take 1 tablet  (10 mg) by mouth 3 times daily as needed for anxiety     Medication Adherence:  Patient reports taking.  taking prescribed medications as prescribed.    Patient Allergies:  No Known Allergies    Medical History:    Past Medical History:   Diagnosis Date    Anxiety     Chickenpox     Diabetes (H) 2015    Irritable bowel syndrome (IBS)      Current Mental Status Exam:   Appearance:  Appropriate    Eye Contact:  Good   Psychomotor:  Normal       Gait / station:  Unable to assess as pt was sitting the entire session  Attitude / Demeanor: Cooperative   Speech      Rate / Production: Normal/ Responsive      Volume:  Normal  volume      Language:  intact  Mood:   Anxious   Affect:   Appropriate  Tearful Worrisome    Thought Content: Clear   Thought Process: Coherent       Associations: No loosening of associations  Insight:   Fair   Judgment:  Intact   Orientation:  All  Attention/concentration: Fair    Substance Use:  Patient did report a family history of substance use concerns; see medical history section for details.  Patient has not received chemical dependency treatment in the past.  Patient has not ever been to detox.      Patient is not currently receiving any chemical dependency treatment.           Substance History of use Age of first use Date of last use     Pattern and duration of use (include amounts and frequency)   Alcohol used in the past   15 09/11/20 REPORTS SUBSTANCE USE: N/A   Cannabis   never used     REPORTS SUBSTANCE USE: N/A     Amphetamines   never used     REPORTS SUBSTANCE USE: N/A   Cocaine/crack    never used       REPORTS SUBSTANCE USE: N/A   Hallucinogens never used         REPORTS SUBSTANCE USE: N/A   Inhalants never used         REPORTS SUBSTANCE USE: N/A   Heroin never used         REPORTS SUBSTANCE USE: N/A   Other Opiates never used     REPORTS SUBSTANCE USE: N/A   Benzodiazepine   never used     REPORTS SUBSTANCE USE: N/A   Barbiturates never used     REPORTS SUBSTANCE USE: N/A   Over  the counter meds used in the past 15 09/11/22 REPORTS SUBSTANCE USE: N/A   Caffeine used in the past 15   REPORTS SUBSTANCE USE: N/A   Nicotine  never used     REPORTS SUBSTANCE USE: N/A   Other substances not listed above:  Identify:  never used     REPORTS SUBSTANCE USE: N/A     Patient reported the following problems as a result of their substance use: family problems; relationship problems.    Substance Use: No symptoms    Based on the negative CAGE score and clinical interview there  are not indications of drug or alcohol abuse.    Significant Losses / Trauma / Abuse / Neglect Issues:   Patient did not  serve in the .  There are indications or report of significant loss, trauma, abuse or neglect issues related to: are no indications and client denies any losses, trauma, abuse, or neglect concerns.  Concerns for possible neglect are not present.     Safety Assessment:   Patient denies current homicidal ideation and behaviors.  Patient denies current self-injurious ideation and behaviors.    Patient denied risk behaviors associated with substance use.  Patient denies any high risk behaviors associated with mental health symptoms.  Patient reports the following current concerns for their personal safety: None.  Patient reports there are not firearms in the house.   There are no firearms in the home..    History of Safety Concerns:  Patient denied a history of homicidal ideation.     Patient denied a history of personal safety concerns.    Patient denied a history of assaultive behaviors.    Patient denied a history of sexual assault behaviors.     Patient denied a history of risk behaviors associated with substance use.  Patient denies any history of high risk behaviors associated with mental health symptoms.  Patient reports the following protective factors: dedication to family or friends; safe and stable environment; regular physical activity; sense of belonging; purpose; daily obligations    Risk Plan:   See Recommendations for Safety and Risk Management Plan    Review of Symptoms per patient report:   Depression: No symptoms  Charley:  No Symptoms  Psychosis: No Symptoms  Anxiety: Excessive worry, Nervousness, Physical complaints, such as headaches, stomachaches, muscle tension, Fears/phobias worrying something bad might happen, Sleep disturbance, Ruminations, and Irritability  Panic:  No symptoms  Post Traumatic Stress Disorder:  No Symptoms   Eating Disorder: No Symptoms  ADD / ADHD:  No symptoms  Conduct Disorder: No symptoms  Autism Spectrum Disorder: No symptoms  Obsessive Compulsive Disorder: No Symptoms    Patient reports the following compulsive behaviors and treatment history:  none .      Diagnostic Criteria:   Generalized Anxiety Disorder  A. Excessive anxiety and worry about a number of events or activities (such as work or school performance).   B. The person finds it difficult to control the worry.   - Restlessness or feeling keyed up or on edge.    - Being easily fatigued.    - Difficulty concentrating or mind going blank.    - Irritability.    - Muscle tension.    - Sleep disturbance (difficulty falling or staying asleep, or restless unsatisfying sleep).   D. The focus of the anxiety and worry is not confined to features of an Axis I disorder.  E. The anxiety, worry, or physical symptoms cause clinically significant distress or impairment in social, occupational, or other important areas of functioning.   F. The disturbance is not due to the direct physiological effects of a substance (e.g., a drug of abuse, a medication) or a general medical condition (e.g., hyperthyroidism) and does not occur exclusively during a Mood Disorder, a Psychotic Disorder, or a Pervasive Developmental Disorder.    Functional Status:  Patient reports the following functional impairments:  childcare / parenting, health maintenance, management of the household and or completion of tasks, relationship(s), self-care, social  interactions, and work / vocational responsibilities.     Nonprogrammatic care:  Patient is requesting basic services to address current mental health concerns.    Clinical Summary:  1. Reason for assessment: ongoing anxiety and process feelings about being adopted.  2. Psychosocial, Cultural and Contextual Factors: none at this time  .  3. Principal DSM5 Diagnoses  (Sustained by DSM5 Criteria Listed Above):   300.02 (F41.1) Generalized Anxiety Disorder.  4. Prognosis: Return to Baseline Functioning, Expect Improvement, Relieve Acute Symptoms, Maintain Current Status / Prevent Deterioration, and Unknown.  5. Likely consequences of symptoms if not treated: worsening symptoms and relationships.  6. Client strengths include:  caring, committed to sobriety, creative, educated, empathetic, employed, goal-focused, good listener, insightful, intelligent, motivated, open to learning, open to suggestions / feedback, responsible parent, support of family, friends and providers, supportive, wants to learn, willing to ask questions, willing to relate to others, and work history .     Recommendations:     1. Plan for Safety and Risk Management:   Safety and Risk: Recommended that patient call 911 or go to the local ED should there be a change in any of these risk factors..          Report to child / adult protection services was NA.     2. Patient's identified  none at this time .     3. Initial Treatment will focus on:    Anxiety - learning coping skills, identifying thoughts and how to process them in a healthy way, verbally address thoughts with family .     4. Resources/Service Plan:    services are not indicated.   Modifications to assist communication are not indicated.   Additional disability accommodations are not indicated.      5. Collaboration:   Collaboration / coordination of treatment will be initiated with the following  support professionals: primary care physician.      6.  Referrals:   The following  referral(s) will be initiated:  continue to work with [facility] . Next Scheduled Appointment: two weeks.      A Release of Information has been obtained for the following:  none at this time .     Clinical Substantiation/medical necessity for the above recommendations:  It is medically necessary for patient to receive outpatient individual therapy. If services are not provided, patient is at risk for increased anxiety and depressive symptoms, social isolation, poor relationships with family, higher level of care and difficulties within relationships.  It is medically necessary that patient participate in weekly/biweekly outpatient therapy services. Therapy services will be completed at the clinic and or via telehelath.     7. PRESLEY:    PRESLEY:  Discussed the general effects of drugs and alcohol on health and well-being, no concerns.    8. Records:   These were not available for review at time of assessment.   Information in this assessment was obtained from the medical record and  provided by patient who is a good historian.    Patient will have open access to their mental health medical record.    9.   Interactive Complexity: No    10. Safety Plan:  Patient denied any current/recent/lifetime history of suicidal ideation and/or behaviors.  No safety plan indicated at this time.     Provider Name/ Credentials:  AMY Leal, MIKO   September 13, 2023     Answers submitted by the patient for this visit:  Patient Health Questionnaire (Submitted on 9/13/2023)  If you checked off any problems, how difficult have these problems made it for you to do your work, take care of things at home, or get along with other people?: Somewhat difficult  PHQ9 TOTAL SCORE: 4

## 2023-09-15 ENCOUNTER — MYC MEDICAL ADVICE (OUTPATIENT)
Dept: FAMILY MEDICINE | Facility: CLINIC | Age: 38
End: 2023-09-15
Payer: COMMERCIAL

## 2023-09-15 DIAGNOSIS — E11.9 TYPE 2 DIABETES MELLITUS WITHOUT COMPLICATION, UNSPECIFIED WHETHER LONG TERM INSULIN USE (H): Primary | ICD-10-CM

## 2023-09-15 NOTE — TELEPHONE ENCOUNTER
Dr. Escobar,    Patient wanting to see endocrine for her DM.  Referral pended for your consideration. Hanh BARNES RN

## 2023-09-21 ENCOUNTER — VIRTUAL VISIT (OUTPATIENT)
Dept: BEHAVIORAL HEALTH | Facility: CLINIC | Age: 38
End: 2023-09-21
Payer: COMMERCIAL

## 2023-09-21 DIAGNOSIS — F41.1 GENERALIZED ANXIETY DISORDER: Primary | ICD-10-CM

## 2023-09-21 PROCEDURE — 90834 PSYTX W PT 45 MINUTES: CPT | Mod: VID

## 2023-09-22 NOTE — PROGRESS NOTES
[facility] Primary Care: Integrated Behavioral Health  September 21, 2023    Behavioral Health Clinician Progress Note    Patient Name: Mary Alice Jordan       Service Type:  Individual      Service Location:   MyChart / Email (patient reached)     Session Start Time: 12:30pm  Session End Time: 1:15pm      Session Length: 38 - 52      Attendees: Patient     Service Modality:  Video Visit:      Provider verified identity through the following two step process.  Patient provided:  Patient is known previously to provider    Telemedicine Visit: The patient's condition can be safely assessed and treated via synchronous audio and visual telemedicine encounter.      Reason for Telemedicine Visit: Patient has requested telehealth visit    Originating Site (Patient Location): Patient's place of employment    Distant Site (Provider Location): [facility] MENTAL [facility] & ADDICTION [facility]    Consent:  The patient/guardian has verbally consented to: the potential risks and benefits of telemedicine (video visit) versus in person care; bill my insurance or make self-payment for services provided; and responsibility for payment of non-covered services.     Patient would like the video invitation sent by:  My Chart    Mode of Communication:  Video Conference via [employer]    Distant Location (Provider):  On-site    As the provider I attest to compliance with applicable laws and regulations related to telemedicine.    Visit Activities (Refresh list every visit): C Only and Referral - Mental Health    Diagnostic Assessment Date: 9/15/23  Treatment Plan Review Date: Will complete in the next few sessions, not completed due to time constraints.    See Flowsheets for today's PHQ-9 and ROBERT-7 results  Previous PHQ-9:       2/6/2023    12:34 PM 9/11/2023    11:15 AM 9/13/2023    12:34 PM   PHQ-9 SCORE   PHQ-9 Total Score [facility] 2 (Minimal depression)  4 (Minimal depression)   PHQ-9 Total Score 2 2 4      Previous ROBERT-7:       9/12/2022    11:11 AM 9/11/2023    10:30 AM 9/11/2023    11:15 AM   ROBERT-7 SCORE   Total Score  17 (severe anxiety)    Total Score 17 17 2     DATA  Extended Session (60+ minutes): No  Interactive Complexity: No  Crisis: No  H Patient: No    Treatment Objective(s) Addressed in This Session:  Target Behavior(s): disease management/lifestyle changes interpersonal relationships and anxiety    Anxiety: will experience a reduction in anxiety, will develop more effective coping skills to manage anxiety symptoms, will develop healthy cognitive patterns and beliefs, and will increase ability to function adaptively  Relationship Problems: will address relationship difficulties in a more adaptive manner    Current Stressors / Issues:  Pt met with writer, stated that she was doing well and that she didn't have much anxiety this last week as her  had been home.  Pt stated that she was using coping skills and working on journaling.  Provided education and reviewed possibility vs probability.  Discuss and review relationships and ways to close chapters when she doesn't have closure.      Progress on Treatment Objective(s) / Homework:  New Objective established this session - PREPARATION (Decided to change - considering how); Intervened by negotiating a change plan and determining options / strategies for behavior change, identifying triggers, exploring social supports, and working towards setting a date to begin behavior change    Motivational Interviewing    MI Intervention: Expressed Empathy/Understanding, Supported Autonomy, Collaboration, Evocation, and Reflections: simple and complex     Change Talk Expressed by the Patient: Ability to change Reasons to change    Provider Response to Change Talk: A - Affirmed patient's thoughts, decisions, or attempts at behavior change and R - Reflected patient's change talk    Assessments completed prior to visit:  The following assessments were completed  by patient for this visit:  PHQ9:       4/22/2020    10:58 AM 11/5/2020    12:59 PM 11/8/2021     3:59 PM 9/12/2022    11:11 AM 2/6/2023    12:34 PM 9/11/2023    11:15 AM 9/13/2023    12:34 PM   PHQ-9 SCORE   PHQ-9 Total Score MyChart     2 (Minimal depression)  4 (Minimal depression)   PHQ-9 Total Score 0 1 2 3 2 2 4     GAD7:       3/9/2020     8:10 AM 4/22/2020    10:58 AM 11/5/2020    12:59 PM 11/8/2021     3:59 PM 9/12/2022    11:11 AM 9/11/2023    10:30 AM 9/11/2023    11:15 AM   ROBERT-7 SCORE   Total Score      17 (severe anxiety)    Total Score 15 12 12 7 17 17 2     CAGE-AID:       9/11/2023     2:06 PM   CAGE-AID Total Score   Total Score 0   Total Score MyChart 0 (A total score of 2 or greater is considered clinically significant)     PROMIS 10-Global Health (only subscores and total score):       9/11/2023     2:05 PM   PROMIS-10 Scores Only   Global Mental Health Score 12   Global Physical Health Score 16   PROMIS TOTAL - SUBSCORES 28     [place] Suicide Severity Rating Scale (Lifetime/Recent)      3/19/2019     3:54 PM 9/13/2023    12:58 PM   [place] Suicide Severity Rating (Lifetime/Recent)   Q1 Wished to be Dead (Past Month) no    Q2 Suicidal Thoughts (Past Month) no    Q6 Suicide Behavior (Lifetime) no    Q1 Wish to be Dead (Lifetime)  N   Q2 Non-Specific Active Suicidal Thoughts (Lifetime)  N       Care Plan review completed: No    Medication Review:  No changes to current psychiatric medication(s)    Medication Compliance:  NA    Changes in Health Issues:   None reported    Chemical Use Review:   Substance Use: Chemical use reviewed, no active concerns identified      Tobacco Use: No current tobacco use.      Assessment: Current Emotional / Mental Status (status of significant symptoms):  Risk status (Self / Other harm or suicidal ideation)  Patient denies a history of suicidal ideation, suicide attempts, self-injurious behavior, homicidal ideation, homicidal behavior, and and other safety  concerns  Patient denies current fears or concerns for personal safety.  Patient denies current or recent suicidal ideation or behaviors.  Patient denies current or recent homicidal ideation or behaviors.  Patient denies current or recent self injurious behavior or ideation.  Patient denies other safety concerns.  A safety and risk management plan has not been developed at this time, however patient was encouraged to call [place] [phone] should there be a change in any of these risk factors.    Appearance:   Appropriate   Eye Contact:   Good   Psychomotor Behavior: Normal   Attitude:   Cooperative   Orientation:   All  Speech   Rate / Production: Normal    Volume:  Normal   Mood:    Normal  Affect:    Appropriate   Thought Content:  Clear   Thought Form:  Coherent  Logical   Insight:    Good     Diagnoses:  1. Generalized anxiety disorder      Collateral Reports Completed:  Not Applicable    Plan: (Homework, other):  Patient was given information about behavioral services and encouraged to schedule a follow up appointment with the clinic [facility] in 2 weeks.  She was also given information about mental health symptoms and treatment options .  CD Recommendations: No indications of CD issues.       Myesha Dawkins, [facility]  9/21/23

## 2023-10-03 ENCOUNTER — ALLIED HEALTH/NURSE VISIT (OUTPATIENT)
Dept: EDUCATION SERVICES | Facility: CLINIC | Age: 38
End: 2023-10-03
Attending: FAMILY MEDICINE
Payer: COMMERCIAL

## 2023-10-03 DIAGNOSIS — E11.9 TYPE 2 DIABETES MELLITUS WITHOUT COMPLICATION, WITHOUT LONG-TERM CURRENT USE OF INSULIN (H): ICD-10-CM

## 2023-10-03 PROCEDURE — G0108 DIAB MANAGE TRN  PER INDIV: HCPCS | Performed by: DIETITIAN, REGISTERED

## 2023-10-03 RX ORDER — BLOOD-GLUCOSE SENSOR
1 EACH MISCELLANEOUS
Qty: 1 EACH | Refills: 0 | Status: SHIPPED | OUTPATIENT
Start: 2023-10-03

## 2023-10-03 RX ORDER — BLOOD-GLUCOSE SENSOR
1 EACH MISCELLANEOUS
Qty: 2 EACH | Refills: 11 | Status: SHIPPED | OUTPATIENT
Start: 2023-10-03

## 2023-10-03 NOTE — Clinical Note
10/3/2023         RE: Mary Alice Jordan  [address] [address]   [place] [id number]        Dear Colleague,    Thank you for referring your patient, Mary Alice Jordan, to the [facility] [place]. Please see a copy of my visit note below.      BG results: +     Jeffrey nad aunts all have STORM     Jammie numbers rising       49 minutes     Diabetes Self-Management Education & Support  Presents for: Individual review  Type of Service: In Person Visit    ASSESSMENT:  Mary Alice has had pre Diabetes and GDM, leading into type 2.  During pregnancy was on insulin and carbohydrate counting.  Has much familiarity with all of this.  Comes in to review medications and numbers.      Post meals (1-2 hours, likely at peak) - 178, 166, 134, 111, 139, 120, 118, 124  Fastin   Reviewed orin phenomenon and blood sugar targets.     Mary Alice's dad has been diagnosed with adult onset type 1 (STORM) and manages with insulin , she notes his sisters also have STORM.  Briefly discussed pathophysiology and it will be worth keeping an eye out for red flags in the future and/or checking labs on year where she has met deductible.     Discussed overview all all antihyperglycemic agents.  Has not started Glipizide yet due to fear of hypoglycemia.  Reviewed pros and cons of all medications. As metformin is relatively inexpensive, could try the lowest dose of extended release one time on a day where she could be home to really rule out if this medication causes too many GI side effects.  Go into the trial knowing she doesn't have to continue it.     Decided to first gather more data and begin with the free sample of the grazyna.  When insurance ran the grazyna it was > 100$.  Discussed cash pay option of  and then only intermittently wearing to lessen cost (2 weeks on 2 weeks off).   Mary Alice wants to try this to see what diet & activity are doing.  She is data driven and loves knowing the numbers.         Patient's most  "recent   is meeting goal of <7.0  Lab Results   Component Value Date    A1C 6.9 07/26/2023    A1C 6.8 02/07/2023    A1C 7.5 03/02/2021    A1C 6.8 10/23/2015       Diabetes knowledge and skills assessment:   Patient is knowledgeable in diabetes management concepts related to: Healthy Eating, Being Active, Monitoring, Taking Medication, Problem Solving, Reducing Risks, and Healthy Coping  Continue education with the following diabetes management concepts: Monitoring, Taking Medication, Problem Solving, and Reducing Risks  Based on learning assessment above, most appropriate setting for further diabetes education would be: Individual setting.      PLAN  Continue with positive diet & lifestyle changes   Follow up with PCP at end of October & discuss continuous glucose monitor results & medication again   Free trial of grazyna 3 to start with, consider cash pay libres intermittently   AVS mycharted since printer is broken    SUBJECTIVE/OBJECTIVE:  Presents for: Individual review  Accompanied by: Self  Diabetes education in the past 24mo: No  Focus of Visit: Monitoring, Reducing Risks, Taking Medication  Diabetes type: Type 2  Disease course: Improving  Other concerns:: None  Cultural Influences/Ethnic Background:  Not  or     Diabetes Symptoms & Complications:       Patient Problem List and Family Medical History reviewed for relevant medical history, current medical status, and diabetes risk factors.    Vitals:  LMP 08/30/2023 (Exact Date)   Estimated body mass index is 45.54 kg/m  as calculated from the following:    Height as of 9/11/23: 1.632 m (5' 4.25\").    Weight as of 9/11/23: 121.3 kg (267 lb 6.4 oz).   Last 3 BP:   BP Readings from Last 3 Encounters:   09/11/23 134/78   08/09/23 (!) 142/90   07/26/23 (!) 144/92       History   Smoking Status     Former     Packs/day: 0.00     Types: Cigarettes   Smokeless Tobacco     Never       Labs:  Lab Results   Component Value Date    A1C 6.9 07/26/2023    A1C " 7.5 03/02/2021     Lab Results   Component Value Date     02/07/2023     10/08/2021     10/08/2015     Lab Results   Component Value Date    LDL 75 02/07/2023    LDL 91 10/08/2015     HDL Cholesterol   Date Value Ref Range Status   10/08/2015 51 >50 mg/dL Final     Direct Measure HDL   Date Value Ref Range Status   02/07/2023 52 >=50 mg/dL Final   ]  GFR Estimate   Date Value Ref Range Status   02/07/2023 >90 >60 mL/min/1.73m2 Final     Comment:     eGFR calculated using 2021 CKD-EPI equation.   03/02/2021 >90 >60 mL/min/[1.73_m2] Final     Comment:     Non  GFR Calc  Starting 12/18/2018, serum creatinine based estimated GFR (eGFR) will be   calculated using the Chronic Kidney Disease Epidemiology Collaboration   (CKD-EPI) equation.       GFR Estimate If Black   Date Value Ref Range Status   03/02/2021 >90 >60 mL/min/[1.73_m2] Final     Comment:      GFR Calc  Starting 12/18/2018, serum creatinine based estimated GFR (eGFR) will be   calculated using the Chronic Kidney Disease Epidemiology Collaboration   (CKD-EPI) equation.       Lab Results   Component Value Date    CR 0.55 02/07/2023    CR 0.49 03/02/2021     No results found for: MICROALBUMIN    Healthy Eating:  Healthy Eating Assessed Today: Yes    Being Active:  Being Active Assessed Today: No    Monitoring:  Monitoring Assessed Today: Yes    Taking Medications:  Diabetes Medication(s)       Sulfonylureas       glipiZIDE (GLUCOTROL XL) 5 MG 24 hr tablet    Take 1 tablet (5 mg) by mouth daily            Taking Medication Assessed Today: Yes  Current Treatments: Diet    Problem Solving:  Yes     Reducing Risks:   Yes     Healthy Coping:  Emotional response to diabetes: Ready to learn  Stage of change: ACTION (Actively working towards change)  Support resources: Websites  Patient Activation Measure Survey Score:      10/31/2014    10:00 AM   BONI Score (Last Two)   BONI Raw Score 45   Activation Score 73.1   BONI  Level 4       Care Plan and Education Provided:  Care Plan: Diabetes   Updates made by Dacia Romero RD since 10/3/2023 12:00 AM        Problem: HbA1C Not In Goal         Goal: Establish Regular Follow-Ups with PCP         Task: Discuss with PCP the recommended timing for patient's next follow up visit(s)    Responsible User: Dacia Romero RD        Task: Discuss schedule for PCP visits with patient Completed 10/3/2023   Responsible User: Dacia Romero RD        Goal: Get HbA1C Level in Goal         Task: Educate patient on diabetes education self-management topics    Responsible User: Dacia Romero RD        Task: Educate patient on benefits of regular glucose monitoring    Responsible User: Dacia Romero RD        Task: Refer patient to appropriate extended care team member, as needed (Medication Therapy Management, Behavioral Health, Physical Therapy, etc.)    Responsible User: Dacia Romero RD        Task: Discuss diabetes treatment plan with patient    Responsible User: Dacia Romero RD        Problem: Diabetes Self-Management Education Needed to Optimize Self-Care Behaviors         Goal: Understand diabetes pathophysiology and disease progression         Task: Provide education on diabetes pathophysiology and disease progression specfic to patient's diabetes type    Responsible User: Dacia Romero RD        Goal: Healthy Eating - follow a healthy eating pattern for diabetes         Task: Provide education on portion control and consistency in amount, composition and timing of food intake    Responsible User: Dacia Romero RD        Task: Provide education on managing carbohydrate intake (carbohydrate counting, plate planning method, etc.)    Responsible User: Dacia Romero RD        Task: Provide education on weight management    Responsible User: Dacia Romero RD        Task: Provide education on heart healthy eating    Responsible  User: Dacia Romreo RD        Task: Provide education on eating out    Responsible User: Dacia Romero RD        Task: Develop individualized healthy eating plan with patient    Responsible User: Dacia Romero RD        Goal: Being Active - get regular physical activity, working up to at least 150 minutes per week         Task: Provide education on relationship of activity to glucose and precautions to take if at risk for low glucose    Responsible User: Dacia Romero RD        Task: Discuss barriers to physical activity with patient    Responsible User: Dacia Romero RD        Task: Develop physical activity plan with patient    Responsible User: Dacia Romero RD        Task: Explore community resources including walking groups, assistance programs, and home videos    Responsible User: Dacia Romero RD        Goal: Monitoring - monitor glucose and ketones as directed         Task: Provide education on blood glucose monitoring (purpose, proper technique, frequency, glucose targets, interpreting results, when to use glucose control solution, sharps disposal) Completed 10/3/2023   Responsible User: Dacia Romero RD        Task: Provide education on continuous glucose monitoring (sensor placement, use of libra or /reader, understanding glucose trends, alerts and alarms, differences between sensor glucose and blood glucose) Completed 10/3/2023   Responsible User: Dacia Romero RD        Task: Provide education on ketone monitoring (when to monitor, frequency, etc.)    Responsible User: Dacia Romero RD        Goal: Taking Medication - patient is consistently taking medications as directed         Task: Provide education on action of prescribed medication, including when to take and possible side effects Completed 10/3/2023   Responsible User: Dacia Romero RD        Task: Provide education on insulin and injectable diabetes medications,  including administration, storage, site selection and rotation for injection sites Completed 10/3/2023   Responsible User: Dacia Romero RD        Task: Discuss barriers to medication adherence with patient and provide management technique ideas as appropriate    Responsible User: Dacia Romero RD        Task: Provide education on frequency and refill details of medications    Responsible User: Dacia Romero RD        Goal: Problem Solving - know how to prevent and manage short-term diabetes complications         Task: Provide education on high blood glucose - causes, signs/symptoms, prevention and treatment    Responsible User: Dacia Romero RD        Task: Provide education on low blood glucose - causes, signs/symptoms, prevention, treatment, carrying a carbohydrate source at all times, and medical identification    Responsible User: Dacia Romero RD        Task: Provide education on safe travel with diabetes    Responsible User: Dacia Romero RD        Task: Provide education on how to care for diabetes on sick days    Responsible User: Dacia Romero RD        Task: Provide education on when to call a health care provider    Responsible User: Dacia Romero RD        Goal: Reducing Risks - know how to prevent and treat long-term diabetes complications         Task: Provide education on major complications of diabetes, prevention, early diagnostic measures and treatment of complications    Responsible User: Dacia Romero RD        Task: Provide education on recommended care for dental, eye and foot health    Responsible User: Dacia oRmero RD        Task: Provide education on Hemoglobin A1c - goals and relationship to blood glucose levels    Responsible User: Dacia Romero RD        Task: Provide education on recommendations for heart health - lipid levels and goals, blood pressure and goals, and aspirin therapy, if indicated     Responsible User: Dacia Romero RD        Task: Provide education on tobacco cessation    Responsible User: Dacia Romero RD        Goal: Healthy Coping - use available resources to cope with the challenges of managing diabetes         Task: Discuss recognizing feelings about having diabetes    Responsible User: Dacia Romero RD        Task: Provide education on the benefits of making appropriate lifestyle changes    Responsible User: Dacia Romero RD        Task: Provide education on benefits of utilizing support systems    Responsible User: Dacia Romero RD        Task: Discuss methods for coping with stress    Responsible User: Dacia Romero RD        Task: Provide education on when to seek professional counseling    Responsible User: Dacia Romero RD Elizabeth Swartout RD, PAMELA, [facility]  Diabetes Education      Time Spent: 49 minutes  Encounter Type: Individual    A copy of this encounter was shared with the provider.

## 2023-10-03 NOTE — PROGRESS NOTES
Diabetes Self-Management Education & Support  Presents for: Individual review  Type of Service: In Person Visit    ASSESSMENT:  Mary Alice has had pre Diabetes and GDM, leading into type 2.  During pregnancy was on insulin and carbohydrate counting.  Has much familiarity with all of this.  Comes in to review medications and numbers.      Post meals (1-2 hours, likely at peak) - 178, 166, 134, 111, 139, 120, 118, 124  Fastin   Reviewed orin phenomenon and blood sugar targets.     Mary Alice's dad has been diagnosed with adult onset type 1 (STORM) and manages with insulin , she notes his sisters also have STORM.  Briefly discussed pathophysiology and it will be worth keeping an eye out for red flags in the future and/or checking labs on year where she has met deductible.     Discussed overview all all antihyperglycemic agents.  Has not started Glipizide yet due to fear of hypoglycemia.  Reviewed pros and cons of all medications. As metformin is relatively inexpensive, could try the lowest dose of extended release one time on a day where she could be home to really rule out if this medication causes too many GI side effects.  Go into the trial knowing she doesn't have to continue it.     Decided to first gather more data and begin with the free sample of the grazyna.  When insurance ran the grazyna it was > 100$.  Discussed cash pay option of  and then only intermittently wearing to lessen cost (2 weeks on 2 weeks off).   Mary Alice wants to try this to see what diet & activity are doing.  She is data driven and loves knowing the numbers.         Patient's most recent   is meeting goal of <7.0  Lab Results   Component Value Date    A1C 6.9 2023    A1C 6.8 2023    A1C 7.5 2021    A1C 6.8 10/23/2015       Diabetes knowledge and skills assessment:   Patient is knowledgeable in diabetes management concepts related to: Healthy Eating, Being Active, Monitoring, Taking Medication, Problem Solving, Reducing Risks,  "and Healthy Coping  Continue education with the following diabetes management concepts: Monitoring, Taking Medication, Problem Solving, and Reducing Risks  Based on learning assessment above, most appropriate setting for further diabetes education would be: Individual setting.      PLAN  Continue with positive diet & lifestyle changes   Follow up with PCP at end of October & discuss continuous glucose monitor results & medication again   Free trial of grazyna 3 to start with, consider cash pay libres intermittently   AVS mycharted since printer is broken    SUBJECTIVE/OBJECTIVE:  Presents for: Individual review  Accompanied by: Self  Diabetes education in the past 24mo: No  Focus of Visit: Monitoring, Reducing Risks, Taking Medication  Diabetes type: Type 2  Disease course: Improving  Other concerns:: None  Cultural Influences/Ethnic Background:  Not  or     Diabetes Symptoms & Complications:       Patient Problem List and Family Medical History reviewed for relevant medical history, current medical status, and diabetes risk factors.    Vitals:  LMP 08/30/2023 (Exact Date)   Estimated body mass index is 45.54 kg/m  as calculated from the following:    Height as of 9/11/23: 1.632 m (5' 4.25\").    Weight as of 9/11/23: 121.3 kg (267 lb 6.4 oz).   Last 3 BP:   BP Readings from Last 3 Encounters:   09/11/23 134/78   08/09/23 (!) 142/90   07/26/23 (!) 144/92       History   Smoking Status    Former    Packs/day: 0.00    Types: Cigarettes   Smokeless Tobacco    Never       Labs:  Lab Results   Component Value Date    A1C 6.9 07/26/2023    A1C 7.5 03/02/2021     Lab Results   Component Value Date     02/07/2023     10/08/2021     10/08/2015     Lab Results   Component Value Date    LDL 75 02/07/2023    LDL 91 10/08/2015     HDL Cholesterol   Date Value Ref Range Status   10/08/2015 51 >50 mg/dL Final     Direct Measure HDL   Date Value Ref Range Status   02/07/2023 52 >=50 mg/dL Final "   ]  GFR Estimate   Date Value Ref Range Status   02/07/2023 >90 >60 mL/min/1.73m2 Final     Comment:     eGFR calculated using 2021 CKD-EPI equation.   03/02/2021 >90 >60 mL/min/[1.73_m2] Final     Comment:     Non  GFR Calc  Starting 12/18/2018, serum creatinine based estimated GFR (eGFR) will be   calculated using the Chronic Kidney Disease Epidemiology Collaboration   (CKD-EPI) equation.       GFR Estimate If Black   Date Value Ref Range Status   03/02/2021 >90 >60 mL/min/[1.73_m2] Final     Comment:      GFR Calc  Starting 12/18/2018, serum creatinine based estimated GFR (eGFR) will be   calculated using the Chronic Kidney Disease Epidemiology Collaboration   (CKD-EPI) equation.       Lab Results   Component Value Date    CR 0.55 02/07/2023    CR 0.49 03/02/2021     No results found for: MICROALBUMIN    Healthy Eating:  Healthy Eating Assessed Today: Yes    Being Active:  Being Active Assessed Today: No    Monitoring:  Monitoring Assessed Today: Yes    Taking Medications:  Diabetes Medication(s)       Sulfonylureas       glipiZIDE (GLUCOTROL XL) 5 MG 24 hr tablet    Take 1 tablet (5 mg) by mouth daily            Taking Medication Assessed Today: Yes  Current Treatments: Diet    Problem Solving:  Yes     Reducing Risks:   Yes     Healthy Coping:  Emotional response to diabetes: Ready to learn  Stage of change: ACTION (Actively working towards change)  Support resources: Websites  Patient Activation Measure Survey Score:      10/31/2014    10:00 AM   BONI Score (Last Two)   BONI Raw Score 45   Activation Score 73.1   BONI Level 4       Care Plan and Education Provided:  Care Plan: Diabetes   Updates made by Dacia Romero RD since 10/3/2023 12:00 AM        Problem: HbA1C Not In Goal         Goal: Establish Regular Follow-Ups with PCP         Task: Discuss with PCP the recommended timing for patient's next follow up visit(s)    Responsible User: Dacia Romero RD         Task: Discuss schedule for PCP visits with patient Completed 10/3/2023   Responsible User: Dacia Romero RD        Goal: Get HbA1C Level in Goal         Task: Educate patient on diabetes education self-management topics    Responsible User: Dacia Romero RD        Task: Educate patient on benefits of regular glucose monitoring    Responsible User: Dacia Romero RD        Task: Refer patient to appropriate extended care team member, as needed (Medication Therapy Management, Behavioral Health, Physical Therapy, etc.)    Responsible User: Dacia Romero RD        Task: Discuss diabetes treatment plan with patient    Responsible User: Dacia Romero RD        Problem: Diabetes Self-Management Education Needed to Optimize Self-Care Behaviors         Goal: Understand diabetes pathophysiology and disease progression         Task: Provide education on diabetes pathophysiology and disease progression specfic to patient's diabetes type    Responsible User: Dacia Romero RD        Goal: Healthy Eating - follow a healthy eating pattern for diabetes         Task: Provide education on portion control and consistency in amount, composition and timing of food intake    Responsible User: Dacia Romero RD        Task: Provide education on managing carbohydrate intake (carbohydrate counting, plate planning method, etc.)    Responsible User: Dacia Romero RD        Task: Provide education on weight management    Responsible User: Dacia Romero RD        Task: Provide education on heart healthy eating    Responsible User: Dacia Romero RD        Task: Provide education on eating out    Responsible User: Dacia Romero RD        Task: Develop individualized healthy eating plan with patient    Responsible User: Dacia Romero RD        Goal: Being Active - get regular physical activity, working up to at least 150 minutes per week         Task: Provide  education on relationship of activity to glucose and precautions to take if at risk for low glucose    Responsible User: Dacia Romero RD        Task: Discuss barriers to physical activity with patient    Responsible User: Dacia Romero RD        Task: Develop physical activity plan with patient    Responsible User: Dacia Romeor RD        Task: Explore community resources including walking groups, assistance programs, and home videos    Responsible User: Dacia Romero RD        Goal: Monitoring - monitor glucose and ketones as directed         Task: Provide education on blood glucose monitoring (purpose, proper technique, frequency, glucose targets, interpreting results, when to use glucose control solution, sharps disposal) Completed 10/3/2023   Responsible User: Dacia Romero RD        Task: Provide education on continuous glucose monitoring (sensor placement, use of libra or /reader, understanding glucose trends, alerts and alarms, differences between sensor glucose and blood glucose) Completed 10/3/2023   Responsible User: Dacia Romero RD        Task: Provide education on ketone monitoring (when to monitor, frequency, etc.)    Responsible User: Dacia Romero RD        Goal: Taking Medication - patient is consistently taking medications as directed         Task: Provide education on action of prescribed medication, including when to take and possible side effects Completed 10/3/2023   Responsible User: Dacia Romero RD        Task: Provide education on insulin and injectable diabetes medications, including administration, storage, site selection and rotation for injection sites Completed 10/3/2023   Responsible User: Dacia Romero RD        Task: Discuss barriers to medication adherence with patient and provide management technique ideas as appropriate    Responsible User: Dacia Romero RD        Task: Provide education on frequency and  refill details of medications    Responsible User: Dacia Romero RD        Goal: Problem Solving - know how to prevent and manage short-term diabetes complications         Task: Provide education on high blood glucose - causes, signs/symptoms, prevention and treatment    Responsible User: Dacia Romero RD        Task: Provide education on low blood glucose - causes, signs/symptoms, prevention, treatment, carrying a carbohydrate source at all times, and medical identification    Responsible User: Dacia Romero RD        Task: Provide education on safe travel with diabetes    Responsible User: Dacia Romero RD        Task: Provide education on how to care for diabetes on sick days    Responsible User: Dacia Romero RD        Task: Provide education on when to call a health care provider    Responsible User: Dacia Romero RD        Goal: Reducing Risks - know how to prevent and treat long-term diabetes complications         Task: Provide education on major complications of diabetes, prevention, early diagnostic measures and treatment of complications    Responsible User: Dacia Romero RD        Task: Provide education on recommended care for dental, eye and foot health    Responsible User: Dacia Romero RD        Task: Provide education on Hemoglobin A1c - goals and relationship to blood glucose levels    Responsible User: Dacia Romero RD        Task: Provide education on recommendations for heart health - lipid levels and goals, blood pressure and goals, and aspirin therapy, if indicated    Responsible User: Dacia Romero RD        Task: Provide education on tobacco cessation    Responsible User: Dacia Romero RD        Goal: Healthy Coping - use available resources to cope with the challenges of managing diabetes         Task: Discuss recognizing feelings about having diabetes    Responsible User: Dacia Romero RD        Task:  Provide education on the benefits of making appropriate lifestyle changes    Responsible User: Dacia Romero RD        Task: Provide education on benefits of utilizing support systems    Responsible User: Dacia Romero RD        Task: Discuss methods for coping with stress    Responsible User: Dacia Romero RD        Task: Provide education on when to seek professional counseling    Responsible User: Dacia Romero RD Elizabeth Swartout RD, LD, [facility]  Diabetes Education      Time Spent: 49 minutes  Encounter Type: Individual    A copy of this encounter was shared with the provider.

## 2023-10-04 ENCOUNTER — VIRTUAL VISIT (OUTPATIENT)
Dept: BEHAVIORAL HEALTH | Facility: CLINIC | Age: 38
End: 2023-10-04
Payer: COMMERCIAL

## 2023-10-04 DIAGNOSIS — F41.1 GENERALIZED ANXIETY DISORDER: Primary | ICD-10-CM

## 2023-10-04 PROCEDURE — 90832 PSYTX W PT 30 MINUTES: CPT | Mod: 95

## 2023-10-04 NOTE — PROGRESS NOTES
[facility] Primary Care: Integrated Behavioral Health  October 4, 2023    Behavioral Health Clinician Progress Note    Patient Name: Mary Alice Jordan       Service Type:  Individual      Service Location:   MyChart / Email (patient reached)     Session Start Time: 11:03am  Session End Time: 11:28am      Session Length: 16 - 37      Attendees: Patient     Service Modality:  Video Visit:      Provider verified identity through the following two step process.  Patient provided:  Patient is known previously to provider    Telemedicine Visit: The patient's condition can be safely assessed and treated via synchronous audio and visual telemedicine encounter.      Reason for Telemedicine Visit: Patient has requested telehealth visit    Originating Site (Patient Location): Patient's place of employment    Distant Site (Provider Location): [facility] MENTAL [facility] & ADDICTION [facility]    Consent:  The patient/guardian has verbally consented to: the potential risks and benefits of telemedicine (video visit) versus in person care; bill my insurance or make self-payment for services provided; and responsibility for payment of non-covered services.     Patient would like the video invitation sent by:  My Chart    Mode of Communication:  Video Conference via [facility]    Distant Location (Provider):  On-site    As the provider I attest to compliance with applicable laws and regulations related to telemedicine.    Visit Activities (Refresh list every visit): C Only and Referral - Mental Health    Diagnostic Assessment Date: 9/15/23  Treatment Plan Review Date: Will complete in the next few sessions, not completed due to time constraints.    See Flowsheets for today's PHQ-9 and ROBERT-7 results  Previous PHQ-9:       2/6/2023    12:34 PM 9/11/2023    11:15 AM 9/13/2023    12:34 PM   PHQ-9 SCORE   PHQ-9 Total Score Davide 2 (Minimal depression)  4 (Minimal depression)   PHQ-9 Total Score 2 2 4      Previous ROBERT-7:       9/12/2022    11:11 AM 9/11/2023    10:30 AM 9/11/2023    11:15 AM   ROBERT-7 SCORE   Total Score  17 (severe anxiety)    Total Score 17 17 2     DATA  Extended Session (60+ minutes): No  Interactive Complexity: No  Crisis: No  [facility] Patient: No    Treatment Objective(s) Addressed in This Session:  Target Behavior(s): disease management/lifestyle changes interpersonal relationships and anxiety    Anxiety: will experience a reduction in anxiety, will develop more effective coping skills to manage anxiety symptoms, will develop healthy cognitive patterns and beliefs, and will increase ability to function adaptively  Relationship Problems: will address relationship difficulties in a more adaptive manner    Current Stressors / Issues:  Pt stated that she noticed that she has not been as emotional and that she was managing her emotions with the help of the medications.  She discussed her anxiety and concerns about leaving her child with others, discussed these thoughts and coping skills.  Develop a plan of when to check in with people watching him.  No concerns for his safety.  Pt agreed to continue journal.  Prompt depersonalization of thoughts and planning a time to address them.      Progress on Treatment Objective(s) / Homework:  New Objective established this session - PREPARATION (Decided to change - considering how); Intervened by negotiating a change plan and determining options / strategies for behavior change, identifying triggers, exploring social supports, and working towards setting a date to begin behavior change    Motivational Interviewing    MI Intervention: Expressed Empathy/Understanding, Supported Autonomy, Collaboration, Evocation, and Reflections: simple and complex     Change Talk Expressed by the Patient: Ability to change Reasons to change    Provider Response to Change Talk: A - Affirmed patient's thoughts, decisions, or attempts at behavior change and R - Reflected patient's  change talk    Assessments completed prior to visit:  The following assessments were completed by patient for this visit:  PHQ9:       4/22/2020    10:58 AM 11/5/2020    12:59 PM 11/8/2021     3:59 PM 9/12/2022    11:11 AM 2/6/2023    12:34 PM 9/11/2023    11:15 AM 9/13/2023    12:34 PM   PHQ-9 SCORE   PHQ-9 Total Score MyChart     2 (Minimal depression)  4 (Minimal depression)   PHQ-9 Total Score 0 1 2 3 2 2 4     GAD7:       3/9/2020     8:10 AM 4/22/2020    10:58 AM 11/5/2020    12:59 PM 11/8/2021     3:59 PM 9/12/2022    11:11 AM 9/11/2023    10:30 AM 9/11/2023    11:15 AM   ROBERT-7 SCORE   Total Score      17 (severe anxiety)    Total Score 15 12 12 7 17 17 2     CAGE-AID:       9/11/2023     2:06 PM   CAGE-AID Total Score   Total Score 0   Total Score MyChart 0 (A total score of 2 or greater is considered clinically significant)     PROMIS 10-Global Health (only subscores and total score):       9/11/2023     2:05 PM   PROMIS-10 Scores Only   Global Mental Health Score 12   Global Physical Health Score 16   PROMIS TOTAL - SUBSCORES 28     [place] Suicide Severity Rating Scale (Lifetime/Recent)      3/19/2019     3:54 PM 9/13/2023    12:58 PM   [place] Suicide Severity Rating (Lifetime/Recent)   Q1 Wished to be Dead (Past Month) no    Q2 Suicidal Thoughts (Past Month) no    Q6 Suicide Behavior (Lifetime) no    Q1 Wish to be Dead (Lifetime)  N   Q2 Non-Specific Active Suicidal Thoughts (Lifetime)  N     Care Plan review completed: No    Medication Review:  No changes to current psychiatric medication(s)    Medication Compliance:  NA    Changes in Health Issues:   None reported    Chemical Use Review:   Substance Use: Chemical use reviewed, no active concerns identified      Tobacco Use: No current tobacco use.      Assessment: Current Emotional / Mental Status (status of significant symptoms):  Risk status (Self / Other harm or suicidal ideation)  Patient denies a history of suicidal ideation, suicide attempts,  self-injurious behavior, homicidal ideation, homicidal behavior, and and other safety concerns  Patient denies current fears or concerns for personal safety.  Patient denies current or recent suicidal ideation or behaviors.  Patient denies current or recent homicidal ideation or behaviors.  Patient denies current or recent self injurious behavior or ideation.  Patient denies other safety concerns.  A safety and risk management plan has not been developed at this time, however patient was encouraged to call [place] [phone] should there be a change in any of these risk factors.    Appearance:   Appropriate   Eye Contact:   Good   Psychomotor Behavior: Normal   Attitude:   Cooperative   Orientation:   All  Speech   Rate / Production: Normal    Volume:  Normal   Mood:    Normal  Affect:    Appropriate   Thought Content:  Clear   Thought Form:  Coherent  Logical   Insight:    Good     Diagnoses:  1. Generalized anxiety disorder      Collateral Reports Completed:  Not Applicable    Plan: (Homework, other):  Patient was given information about behavioral services and encouraged to schedule a follow up appointment with the clinic [facility] in 2 weeks.  She was also given information about mental health symptoms and treatment options .  CD Recommendations: No indications of CD issues.       MIKO Leal  10/4/23

## 2023-10-05 ENCOUNTER — NURSE TRIAGE (OUTPATIENT)
Dept: NURSING | Facility: CLINIC | Age: 38
End: 2023-10-05
Payer: COMMERCIAL

## 2023-10-06 NOTE — TELEPHONE ENCOUNTER
Patient calling reports waking up with itchy hives to the upper leg, stomach and breast area. Reports not having any new foods or products. Denies difficulty breathing, swollen tongue and hoarse cough. Home care advice provided per protocol. Call back instructions provided. Patient agreeable to plan.     Vandana Duenas RN 10/05/23 11:31 PM    Health Triage Nurse Advisor      Reason for Disposition   Widespread hives    Additional Information   Negative: [1] Life-threatening reaction (anaphylaxis) in the past to similar substance (e.g., food, insect bite/sting, chemical, etc.) AND [2] < 2 hours since exposure   Negative: Difficulty breathing or wheezing now   Negative: [1] Swollen tongue AND [2] rapid onset   Negative: [1] Hoarseness or cough now AND [2] rapid onset   Negative: Shock suspected (e.g., cold/pale/clammy skin, too weak to stand, low BP, rapid pulse)   Negative: Difficult to awaken or acting confused (e.g., disoriented, slurred speech)   Negative: Sounds like a life-threatening emergency to the triager   Negative: [1] Bee, wasp, or yellow jacket sting AND [2] within last 24 hours   Negative: Blood-colored, dark red or purple rash   Negative: [1] Drug rash suspected AND [2] started taking new medicine within last 2 weeks(Exception: Antihistamine, eye drops, ear drops, decongestant or other OTC cough/cold medicines.)   Negative: Doesn't match the SYMPTOMS of hives   Negative: Swollen tongue   Negative: [1] Widespread hives, itching or facial swelling AND [2] onset < 2 hours of exposure to high-risk allergen (e.g., sting, nuts, 1st dose of antibiotic)   Negative: Patient sounds very sick or weak to the triager   Negative: [1] MODERATE-SEVERE hives persist (i.e., hives interfere with normal activities or work) AND [2] taking antihistamine (e.g., Benadryl, Claritin) > 24 hours   Negative: [1] Hives have become worse AND [2] taking oral steroids (e.g., prednisone) > 24 hours   Negative: Joint swelling    Negative: [1] Abdominal pain AND [2] pain present > 12 hours   Negative: Fever   Negative: [1] Widespread hives, itching or facial swelling AND [2] onset > 2 hours after exposure to high-risk allergen (e.g., sting, nuts, 1st dose of antibiotic)   Negative: [1] Hives from food reaction AND [2] diagnosis never confirmed by a doctor (or NP/PA)   Negative: Hives persist > 1 week   Negative: [1] Hives has occurred 3 or more times in the last year AND [2] the cause was not found   Negative: [1] Hives from food reaction AND [2] diagnosis already confirmed   Negative: Localized hives    Protocols used: Hives-A-

## 2023-10-11 ENCOUNTER — MYC REFILL (OUTPATIENT)
Dept: EDUCATION SERVICES | Facility: CLINIC | Age: 38
End: 2023-10-11
Payer: COMMERCIAL

## 2023-10-11 DIAGNOSIS — E11.9 TYPE 2 DIABETES MELLITUS WITHOUT COMPLICATION, WITHOUT LONG-TERM CURRENT USE OF INSULIN (H): ICD-10-CM

## 2023-10-11 RX ORDER — BLOOD-GLUCOSE SENSOR
1 EACH MISCELLANEOUS
Qty: 1 EACH | Refills: 0 | OUTPATIENT
Start: 2023-10-11

## 2023-10-18 ENCOUNTER — VIRTUAL VISIT (OUTPATIENT)
Dept: BEHAVIORAL HEALTH | Facility: CLINIC | Age: 38
End: 2023-10-18

## 2023-10-18 DIAGNOSIS — F41.1 GENERALIZED ANXIETY DISORDER: Primary | ICD-10-CM

## 2023-10-18 PROCEDURE — 90832 PSYTX W PT 30 MINUTES: CPT | Mod: 95

## 2023-10-18 NOTE — PROGRESS NOTES
[facility] Primary Care: Integrated Behavioral Health  October 18, 2023    Behavioral Health Clinician Progress Note    Patient Name: Mary Alice Jordan       Service Type:  Individual      Service Location:   MyChart / Email (patient reached)     Session Start Time: 2:32pm  Session End Time: 3:01pm      Session Length: 16 - 37      Attendees: Patient     Service Modality:  Video Visit:      Provider verified identity through the following two step process.  Patient provided:  Patient is known previously to provider    Telemedicine Visit: The patient's condition can be safely assessed and treated via synchronous audio and visual telemedicine encounter.      Reason for Telemedicine Visit: Patient has requested telehealth visit    Originating Site (Patient Location): Patient's place of employment    Distant Site (Provider Location): [facility] MENTAL [facility] & ADDICTION [facility]    Consent:  The patient/guardian has verbally consented to: the potential risks and benefits of telemedicine (video visit) versus in person care; bill my insurance or make self-payment for services provided; and responsibility for payment of non-covered services.     Patient would like the video invitation sent by:  My Chart    Mode of Communication:  [facility] Conference [facility]    Distant Location (Provider):  On-site    As the provider I attest to compliance with applicable laws and regulations related to telemedicine.    Visit Activities (Refresh list every visit): C Only and Referral - Mental Health    Diagnostic Assessment Date: 9/15/23  Treatment Plan Review Date: Will complete in the next few sessions, not completed due to time constraints.    See Flowsheets for today's PHQ-9 and ROBERT-7 results  Previous PHQ-9:       2/6/2023    12:34 PM 9/11/2023    11:15 AM 9/13/2023    12:34 PM   PHQ-9 SCORE   PHQ-9 Total Score Gurpreett 2 (Minimal depression)  4 (Minimal depression)   PHQ-9 Total Score 2 2 4      Previous ROBERT-7:       9/12/2022    11:11 AM 9/11/2023    10:30 AM 9/11/2023    11:15 AM   ROBERT-7 SCORE   Total Score  17 (severe anxiety)    Total Score 17 17 2     DATA  Extended Session (60+ minutes): No  Interactive Complexity: No  Crisis: No  [facility] Patient: No    Treatment Objective(s) Addressed in This Session:  Target Behavior(s): disease management/lifestyle changes interpersonal relationships and anxiety    Anxiety: will experience a reduction in anxiety, will develop more effective coping skills to manage anxiety symptoms, will develop healthy cognitive patterns and beliefs, and will increase ability to function adaptively  Relationship Problems: will address relationship difficulties in a more adaptive manner    Current Stressors / Issues:  Pt discussed her anxiety and managing her symptoms, she did not stress about her son being with .  Discussed and processed the upcoming anxiety trip.  Processed anxiety and prompted using PRN as needed.  Prompted to continue to journal and talk with her  to help her manage her thoughts.   Prompt depersonalization of thoughts and planning a time to address them.      Progress on Treatment Objective(s) / Homework:  Satisfactory progress - ACTION (Actively working towards change); Intervened by reinforcing change plan / affirming steps taken    Motivational Interviewing    MI Intervention: Expressed Empathy/Understanding, Supported Autonomy, Collaboration, Evocation, and Reflections: simple and complex     Change Talk Expressed by the Patient: Ability to change Reasons to change    Provider Response to Change Talk: A - Affirmed patient's thoughts, decisions, or attempts at behavior change and R - Reflected patient's change talk    Assessments completed prior to visit:  The following assessments were completed by patient for this visit:  PHQ9:       4/22/2020    10:58 AM 11/5/2020    12:59 PM 11/8/2021     3:59 PM 9/12/2022    11:11 AM 2/6/2023    12:34 PM  9/11/2023    11:15 AM 9/13/2023    12:34 PM   PHQ-9 SCORE   PHQ-9 Total Score MyChart     2 (Minimal depression)  4 (Minimal depression)   PHQ-9 Total Score 0 1 2 3 2 2 4     GAD7:       3/9/2020     8:10 AM 4/22/2020    10:58 AM 11/5/2020    12:59 PM 11/8/2021     3:59 PM 9/12/2022    11:11 AM 9/11/2023    10:30 AM 9/11/2023    11:15 AM   ROBERT-7 SCORE   Total Score      17 (severe anxiety)    Total Score 15 12 12 7 17 17 2     CAGE-AID:       9/11/2023     2:06 PM   CAGE-AID Total Score   Total Score 0   Total Score MyChart 0 (A total score of 2 or greater is considered clinically significant)     PROMIS 10-Global Health (only subscores and total score):       9/11/2023     2:05 PM   PROMIS-10 Scores Only   Global Mental Health Score 12   Global Physical Health Score 16   PROMIS TOTAL - SUBSCORES 28     [place] Suicide Severity Rating Scale (Lifetime/Recent)      3/19/2019     3:54 PM 9/13/2023    12:58 PM   [place] Suicide Severity Rating (Lifetime/Recent)   Q1 Wished to be Dead (Past Month) no    Q2 Suicidal Thoughts (Past Month) no    Q6 Suicide Behavior (Lifetime) no    Q1 Wish to be Dead (Lifetime)  N   Q2 Non-Specific Active Suicidal Thoughts (Lifetime)  N     Care Plan review completed: No    Medication Review:  No changes to current psychiatric medication(s)    Medication Compliance:  NA    Changes in Health Issues:   None reported    Chemical Use Review:   Substance Use: Chemical use reviewed, no active concerns identified      Tobacco Use: No current tobacco use.      Assessment: Current Emotional / Mental Status (status of significant symptoms):  Risk status (Self / Other harm or suicidal ideation)  Patient denies a history of suicidal ideation, suicide attempts, self-injurious behavior, homicidal ideation, homicidal behavior, and and other safety concerns  Patient denies current fears or concerns for personal safety.  Patient denies current or recent suicidal ideation or behaviors.  Patient denies  current or recent homicidal ideation or behaviors.  Patient denies current or recent self injurious behavior or ideation.  Patient denies other safety concerns.  A safety and risk management plan has not been developed at this time, however patient was encouraged to call [place] [phone] should there be a change in any of these risk factors.    Appearance:   Appropriate   Eye Contact:   Good   Psychomotor Behavior: Normal   Attitude:   Cooperative   Orientation:   All  Speech   Rate / Production: Normal    Volume:  Normal   Mood:    Normal  Affect:    Appropriate   Thought Content:  Clear   Thought Form:  Coherent  Logical   Insight:    Good     Diagnoses:  1. Generalized anxiety disorder      Collateral Reports Completed:  Not Applicable    Plan: (Homework, other):  Patient was given information about behavioral services and encouraged to schedule a follow up appointment with the clinic [facility] in 2 weeks.  She was also given information about mental health symptoms and treatment options .  CD Recommendations: No indications of CD issues.       MIKO Leal  10/18/23

## 2023-10-24 ENCOUNTER — NURSE TRIAGE (OUTPATIENT)
Dept: NURSING | Facility: CLINIC | Age: 38
End: 2023-10-24
Payer: COMMERCIAL

## 2023-10-24 NOTE — PROGRESS NOTES
Outcome for 10/24/23 8:24 AM: [employer] message sent  Itzel Ramírez MA  Outcome for 10/30/23 2:17 PM: Per patient, will upload device before appointment  Beth Fenton MA  Outcome for 10/30/23 3:27 PM: Data obtained via [employer] website  Beth Fenton MA      Patient is showing 4/5 MNCM met. Not on statin   Beth Fenton MA

## 2023-10-25 NOTE — TELEPHONE ENCOUNTER
138  Patient had questions about carbohydrates and how they affect her blood glucose. Patient was educated on eating protiens with carbohydrates to help keep from blood sugars from going up and then rapidly dropping. Patient verbalized understanding of care advice.    Rossy Mitchell RN on 10/24/2023 at 10:24 PM       Reason for Disposition   Health Information question, no triage required and triager able to answer question    Protocols used: Information Only Call - No Triage-A-

## 2023-10-26 ENCOUNTER — MYC MEDICAL ADVICE (OUTPATIENT)
Dept: FAMILY MEDICINE | Facility: CLINIC | Age: 38
End: 2023-10-26

## 2023-10-26 ENCOUNTER — OFFICE VISIT (OUTPATIENT)
Dept: FAMILY MEDICINE | Facility: CLINIC | Age: 38
End: 2023-10-26
Payer: COMMERCIAL

## 2023-10-26 VITALS
SYSTOLIC BLOOD PRESSURE: 122 MMHG | HEIGHT: 64 IN | HEART RATE: 76 BPM | RESPIRATION RATE: 18 BRPM | OXYGEN SATURATION: 98 % | WEIGHT: 262 LBS | DIASTOLIC BLOOD PRESSURE: 78 MMHG | BODY MASS INDEX: 44.73 KG/M2 | TEMPERATURE: 98.2 F

## 2023-10-26 DIAGNOSIS — E11.9 TYPE 2 DIABETES MELLITUS WITHOUT COMPLICATION, WITHOUT LONG-TERM CURRENT USE OF INSULIN (H): Primary | ICD-10-CM

## 2023-10-26 LAB
HBA1C MFR BLD: 6.3 % (ref 0–5.6)
HOLD SPECIMEN: NORMAL

## 2023-10-26 PROCEDURE — 90686 IIV4 VACC NO PRSV 0.5 ML IM: CPT | Performed by: FAMILY MEDICINE

## 2023-10-26 PROCEDURE — 90471 IMMUNIZATION ADMIN: CPT | Performed by: FAMILY MEDICINE

## 2023-10-26 PROCEDURE — 83036 HEMOGLOBIN GLYCOSYLATED A1C: CPT | Performed by: FAMILY MEDICINE

## 2023-10-26 PROCEDURE — 36415 COLL VENOUS BLD VENIPUNCTURE: CPT | Performed by: FAMILY MEDICINE

## 2023-10-26 PROCEDURE — 99213 OFFICE O/P EST LOW 20 MIN: CPT | Mod: 25 | Performed by: FAMILY MEDICINE

## 2023-10-26 RX ORDER — METFORMIN HCL 500 MG
500 TABLET, EXTENDED RELEASE 24 HR ORAL
Qty: 90 TABLET | Refills: 3 | Status: SHIPPED | OUTPATIENT
Start: 2023-10-26 | End: 2024-05-29

## 2023-10-26 ASSESSMENT — ANXIETY QUESTIONNAIRES
5. BEING SO RESTLESS THAT IT IS HARD TO SIT STILL: SEVERAL DAYS
3. WORRYING TOO MUCH ABOUT DIFFERENT THINGS: SEVERAL DAYS
7. FEELING AFRAID AS IF SOMETHING AWFUL MIGHT HAPPEN: SEVERAL DAYS
4. TROUBLE RELAXING: NOT AT ALL
6. BECOMING EASILY ANNOYED OR IRRITABLE: NOT AT ALL
GAD7 TOTAL SCORE: 5
GAD7 TOTAL SCORE: 5
2. NOT BEING ABLE TO STOP OR CONTROL WORRYING: SEVERAL DAYS
1. FEELING NERVOUS, ANXIOUS, OR ON EDGE: SEVERAL DAYS
IF YOU CHECKED OFF ANY PROBLEMS ON THIS QUESTIONNAIRE, HOW DIFFICULT HAVE THESE PROBLEMS MADE IT FOR YOU TO DO YOUR WORK, TAKE CARE OF THINGS AT HOME, OR GET ALONG WITH OTHER PEOPLE: SOMEWHAT DIFFICULT

## 2023-10-26 ASSESSMENT — ENCOUNTER SYMPTOMS
CONSTITUTIONAL NEGATIVE: 1
CARDIOVASCULAR NEGATIVE: 1
MUSCULOSKELETAL NEGATIVE: 1
HEMATOLOGIC/LYMPHATIC NEGATIVE: 1
GASTROINTESTINAL NEGATIVE: 1
ALLERGIC/IMMUNOLOGIC NEGATIVE: 1
EYES NEGATIVE: 1
RESPIRATORY NEGATIVE: 1
PSYCHIATRIC NEGATIVE: 1
NEUROLOGICAL NEGATIVE: 1
ENDOCRINE NEGATIVE: 1

## 2023-10-26 ASSESSMENT — PAIN SCALES - GENERAL: PAINLEVEL: NO PAIN (0)

## 2023-10-26 NOTE — PROGRESS NOTES
Assessment & Plan     (E11.9) Type 2 diabetes mellitus without complication, without long-term current use of insulin (H)  (primary encounter diagnosis)  Comment: A1C is much improved. Patient asked to try the metformin. If she has side effects she can stop the medication.   Plan: Hemoglobin A1c, metFORMIN (GLUCOPHAGE XR) 500         MG 24 hr tablet, **Hemoglobin A1c FUTURE 3mo            Review of external notes as documented elsewhere in note       FUTURE APPOINTMENTS:       - Follow-up visit in three months or sooner as needed.    Jose Escobar MD  [facility]    Chau Wheat is a 38 year old, presenting for the following health issues:    38 yr old  here for recheck of her diabetes. She states that she has been watching her diet and she is exercising. She lost five pounds. Her A1C is doing much better. She is open to trying metformin. She spoke with the diabetic educator. She also has an appointment with endocrinology.   She states that the last few days her blood sugars have been dropping despite she taking food to bring it up. She had a few shaky episodes where her blood sugars were in the 80s. She is not taking any medication presently.   Diabetes (Recheck on diabetes.  She met with the diabetic educator and wants her to discuss with her PCP about a different medication than the Glipizide.  She didn't start the Glipizide. ), Hypertension (Recheck on blood pressure.), Depression (Recheck on depression and anxiety.), and Imm/Inj (Flu shot today.)        10/26/2023    10:59 AM   Additional Questions   Roomed by Mariel Ashton CMA     Chief Complaint   Patient presents with    Diabetes     Recheck on diabetes.  She met with the diabetic educator and wants her to discuss with her PCP about a different medication than the Glipizide.  She didn't start the Glipizide.     Hypertension     Recheck on blood pressure.    Depression     Recheck on depression and anxiety.     Imm/Inj     Flu shot today.       History of Present Illness       Mental Health Follow-up:  Patient presents to follow-up on Depression & Anxiety.Patient's depression since last visit has been:  Better  The patient is not having other symptoms associated with depression.  Patient's anxiety since last visit has been:  Medium  The patient is having other symptoms associated with anxiety.  Any significant life events: health concerns and other  Patient is feeling anxious or having panic attacks.  Patient has no concerns about alcohol or drug use.    Diabetes:   She presents for follow up of diabetes.  She is checking home blood glucose four or more times daily.   She checks blood glucose before meals, after meals and at bedtime.  Blood glucose is never over 200 and never under 70. She is aware of hypoglycemia symptoms including none.   She is concerned about other.    She is not experiencing numbness or burning in feet, excessive thirst, blurry vision, weight changes or redness, sores or blisters on feet. The patient has had a diabetic eye exam in the last 12 months. Eye exam performed on august 2023. Location of last eye exam [facility] eye clinic.        Hypertension: She presents for follow up of hypertension.  She does check blood pressure  regularly outside of the clinic. Outpatient blood pressures have not been over 140/90. She follows a low salt diet.     She eats 2-3 servings of fruits and vegetables daily.She consumes 0 sweetened beverage(s) daily.She exercises with enough effort to increase her heart rate 20 to 29 minutes per day.  She exercises with enough effort to increase her heart rate 7 days per week.   She is taking medications regularly.                 Review of Systems   Constitutional: Negative.    HENT: Negative.     Eyes: Negative.    Respiratory: Negative.     Cardiovascular: Negative.    Gastrointestinal: Negative.    Endocrine: Negative.    Breasts:  negative.    Genitourinary: Negative.   "  Musculoskeletal: Negative.    Allergic/Immunologic: Negative.    Neurological: Negative.    Hematological: Negative.    Psychiatric/Behavioral: Negative.              Objective    /78 (BP Location: Right arm, Patient Position: Chair, Cuff Size: Adult Large)   Pulse 76   Temp 98.2  F (36.8  C) (Tympanic)   Resp 18   Ht 1.632 m (5' 4.25\")   Wt 118.8 kg (262 lb)   LMP 10/02/2023 (Exact Date)   SpO2 98%   BMI 44.62 kg/m    Body mass index is 44.62 kg/m .  Physical Exam   GENERAL: healthy, alert and no distress  NECK: no adenopathy, no asymmetry, masses, or scars and thyroid normal to palpation  RESP: lungs clear to auscultation - no rales, rhonchi or wheezes  CV: regular rate and rhythm, normal S1 S2, no S3 or S4, no murmur, click or rub, no peripheral edema and peripheral pulses strong  ABDOMEN: soft, nontender, no hepatosplenomegaly, no masses and bowel sounds normal  MS: no gross musculoskeletal defects noted, no edema    Results for orders placed or performed in visit on 10/26/23 (from the past 24 hour(s))   Extra Tube    Narrative    The following orders were created for panel order Extra Tube.  Procedure                               Abnormality         Status                     ---------                               -----------         ------                     Extra Serum Separator Tu...[[id number]]                      In process                 Extra Green Top (Lithium...[[id number]]                      In process                 Extra Purple Top Tube[[id number]]                            In process                   Please view results for these tests on the individual orders.   Hemoglobin A1c   Result Value Ref Range    Hemoglobin A1C 6.3 (H) 0.0 - 5.6 %                     "

## 2023-10-27 ENCOUNTER — VIRTUAL VISIT (OUTPATIENT)
Dept: BEHAVIORAL HEALTH | Facility: CLINIC | Age: 38
End: 2023-10-27
Payer: COMMERCIAL

## 2023-10-27 DIAGNOSIS — F41.1 GENERALIZED ANXIETY DISORDER: Primary | ICD-10-CM

## 2023-10-27 PROCEDURE — 90832 PSYTX W PT 30 MINUTES: CPT | Mod: 95

## 2023-10-27 NOTE — PROGRESS NOTES
[facility] Primary Care: Integrated Behavioral Health  October 27, 2023    Behavioral Health Clinician Progress Note    Patient Name: Mary Alice Jordan       Service Type:  Individual      Service Location:   MyChart / Email (patient reached)     Session Start Time: 8:30am  Session End Time: 8:50am       Session Length: 16 - 37      Attendees: Patient     Service Modality:  Video Visit:      Provider verified identity through the following two step process.  Patient provided:  Patient is known previously to provider    Telemedicine Visit: The patient's condition can be safely assessed and treated via synchronous audio and visual telemedicine encounter.      Reason for Telemedicine Visit: Patient has requested telehealth visit    Originating Site (Patient Location): Patient's place of employment    Distant Site (Provider Location): [facility] MENTAL [facility] & ADDICTION [facility]    Consent:  The patient/guardian has verbally consented to: the potential risks and benefits of telemedicine (video visit) versus in person care; bill my insurance or make self-payment for services provided; and responsibility for payment of non-covered services.     Patient would like the video invitation sent by:  My Chart    Mode of Communication:  Video Conference [facility]    Distant Location (Provider):  On-site    As the provider I attest to compliance with applicable laws and regulations related to telemedicine.    Visit Activities (Refresh list every visit): C Only and Referral - Mental Health    Diagnostic Assessment Date: 9/15/23  Treatment Plan Review Date: Will complete in the next few sessions, not completed due to time constraints.    See Flowsheets for today's PHQ-9 and ROBERT-7 results  Previous PHQ-9:       2/6/2023    12:34 PM 9/11/2023    11:15 AM 9/13/2023    12:34 PM   PHQ-9 SCORE   PHQ-9 Total [facility] 2 (Minimal depression)  4 (Minimal depression)   PHQ-9 Total Score 2 2 4      Previous ROBERT-7:       9/11/2023    10:30 AM 9/11/2023    11:15 AM 10/26/2023    10:51 AM   ROBERT-7 SCORE   Total Score 17 (severe anxiety)  5 (mild anxiety)   Total Score 17 2 5     DATA  Extended Session (60+ minutes): No  Interactive Complexity: No  Crisis: No  [facility] Patient: No    Treatment Objective(s) Addressed in This Session:  Target Behavior(s): disease management/lifestyle changes interpersonal relationships and anxiety    Anxiety: will experience a reduction in anxiety, will develop more effective coping skills to manage anxiety symptoms, will develop healthy cognitive patterns and beliefs, and will increase ability to function adaptively  Relationship Problems: will address relationship difficulties in a more adaptive manner    Current Stressors / Issues:  Pt stated that she was so proud of herself for only asking once for a picture/update on her son.  She stated that she had taken two prns but was still happy about her progress.  Discussed her next goal of building time away from him and feeling comfortable.  Discussed relationship with bio dad and wanting to reach out to him, brainstorm ways to do this.  Review potential for father to not want to reach out or have communication with her and how she would take care of herself.     Progress on Treatment Objective(s) / Homework:  Satisfactory progress - ACTION (Actively working towards change); Intervened by reinforcing change plan / affirming steps taken    Motivational Interviewing    MI Intervention: Expressed Empathy/Understanding, Supported Autonomy, Collaboration, Evocation, and Reflections: simple and complex     Change Talk Expressed by the Patient: Ability to change Reasons to change    Provider Response to Change Talk: A - Affirmed patient's thoughts, decisions, or attempts at behavior change and R - Reflected patient's change talk    Assessments completed prior to visit:  The following assessments were completed by patient for this visit:  PHQ9:        4/22/2020    10:58 AM 11/5/2020    12:59 PM 11/8/2021     3:59 PM 9/12/2022    11:11 AM 2/6/2023    12:34 PM 9/11/2023    11:15 AM 9/13/2023    12:34 PM   PHQ-9 SCORE   PHQ-9 Total Score MyChart     2 (Minimal depression)  4 (Minimal depression)   PHQ-9 Total Score 0 1 2 3 2 2 4     GAD7:       4/22/2020    10:58 AM 11/5/2020    12:59 PM 11/8/2021     3:59 PM 9/12/2022    11:11 AM 9/11/2023    10:30 AM 9/11/2023    11:15 AM 10/26/2023    10:51 AM   ROBERT-7 SCORE   Total Score     17 (severe anxiety)  5 (mild anxiety)   Total Score 12 12 7 17 17 2 5     CAGE-AID:       9/11/2023     2:06 PM   CAGE-AID Total Score   Total Score 0   Total Score MyChart 0 (A total score of 2 or greater is considered clinically significant)     PROMIS 10-Global Health (only subscores and total score):       9/11/2023     2:05 PM   PROMIS-10 Scores Only   Global Mental Health Score 12   Global Physical Health Score 16   PROMIS TOTAL - SUBSCORES 28     [place] Suicide Severity Rating Scale (Lifetime/Recent)      3/19/2019     3:54 PM 9/13/2023    12:58 PM   [place] Suicide Severity Rating (Lifetime/Recent)   Q1 Wished to be Dead (Past Month) no    Q2 Suicidal Thoughts (Past Month) no    Q6 Suicide Behavior (Lifetime) no    Q1 Wish to be Dead (Lifetime)  N   Q2 Non-Specific Active Suicidal Thoughts (Lifetime)  N     Care Plan review completed: No    Medication Review:  No changes to current psychiatric medication(s)    Medication Compliance:  NA    Changes in Health Issues:   None reported    Chemical Use Review:   Substance Use: Chemical use reviewed, no active concerns identified      Tobacco Use: No current tobacco use.      Assessment: Current Emotional / Mental Status (status of significant symptoms):  Risk status (Self / Other harm or suicidal ideation)  Patient denies a history of suicidal ideation, suicide attempts, self-injurious behavior, homicidal ideation, homicidal behavior, and and other safety concerns  Patient denies  current fears or concerns for personal safety.  Patient denies current or recent suicidal ideation or behaviors.  Patient denies current or recent homicidal ideation or behaviors.  Patient denies current or recent self injurious behavior or ideation.  Patient denies other safety concerns.  A safety and risk management plan has not been developed at this time, however patient was encouraged to call [place] [phone] should there be a change in any of these risk factors.    Appearance:   Appropriate   Eye Contact:   Good   Psychomotor Behavior: Normal   Attitude:   Cooperative   Orientation:   All  Speech   Rate / Production: Normal    Volume:  Normal   Mood:    Normal  Affect:    Appropriate   Thought Content:  Clear   Thought Form:  Coherent  Logical   Insight:    Good     Diagnoses:  1. Generalized anxiety disorder      Collateral Reports Completed:  Not Applicable    Plan: (Homework, other):  Patient was given information about behavioral services and encouraged to schedule a follow up appointment with the clinic [facility] in 2 weeks.  She was also given information about mental health symptoms and treatment options .  CD Recommendations: No indications of CD issues.       Myesha Dawkins, [facility]  10/27/23

## 2023-10-30 NOTE — PATIENT INSTRUCTIONS
New Diabetes Patient Info  Welcome to the Diabetes Optimization Program at the Endocrinology and Diabetes Clinic at [facility] and [place]!    Our Diabetes Optimization Program is here to provide you with a team-based, collaborative approach to optimize your diabetes management. The team is made up of Endocrinologists and Physician Assistants here at the Clinic, your Primary Care Physician, Certified Diabetes Educators, Registered Nurses, Medical Assistants, Emergency Medical Technicians and Visit Facilitators.     During your care with us, we promise to:   Work with you and your Primary Care Provider - Jose Escobar   to optimize your diabetes management.   Provide you with the tools and support you need to achieve a healthier lifestyle  Help you to control your diabetes by offering new treatments, education, and support to improve your diabetes management  Help you graduate back to your primary care provider for ongoing care once your diabetes is under control      Endocrinology Clinics Phone Number: [phone]    [facility] Address:   [place] Address:     [address] [place] [id number]   [id number] [place] [address] [place] [address]

## 2023-10-31 ENCOUNTER — VIRTUAL VISIT (OUTPATIENT)
Dept: FAMILY MEDICINE | Facility: CLINIC | Age: 38
End: 2023-10-31
Payer: COMMERCIAL

## 2023-10-31 DIAGNOSIS — H10.32 ACUTE BACTERIAL CONJUNCTIVITIS OF LEFT EYE: Primary | ICD-10-CM

## 2023-10-31 PROCEDURE — 99213 OFFICE O/P EST LOW 20 MIN: CPT | Mod: VID | Performed by: PHYSICIAN ASSISTANT

## 2023-10-31 RX ORDER — POLYMYXIN B SULFATE AND TRIMETHOPRIM 1; 10000 MG/ML; [USP'U]/ML
1-2 SOLUTION OPHTHALMIC EVERY 4 HOURS
Qty: 10 ML | Refills: 0 | Status: SHIPPED | OUTPATIENT
Start: 2023-10-31 | End: 2024-05-29

## 2023-10-31 NOTE — PROGRESS NOTES
Mary Alice is a 38 year old who is being evaluated via a billable video visit.      How would you like to obtain your AVS? MyChart  If the video visit is dropped, the invitation should be resent by: Text to cell phone: [phone]  Will anyone else be joining your video visit? No      Assessment & Plan       ICD-10-CM    1. Acute bacterial conjunctivitis of left eye  H10.32 trimethoprim-polymyxin b (POLYTRIM) 55953-0.1 UNIT/ML-% ophthalmic solution        Follow up if eye not improving or any worsening/change in symptoms    [facility]    Chau Wheat is a 38 year old, presenting for the following health issues:  No chief complaint on file.      HPI     Possible Pink eye    Son was diagnosed last week with pink eye  Last night she noticed that her eye was watery and felt irritated  This morning woke up and was completely shut, it's constantly watery and goopy  No cold symptoms - just the goopy eye      Review of Systems   Remainder of ROS obtained and found to be negative other than that which was documented above        Objective           Vitals:  No vitals were obtained today due to virtual visit.    Physical Exam   GENERAL: Healthy, alert and no distress  EYES: Eyes grossly normal to inspection.  Watery discharge with mild erythema noted over left eye  RESP: No audible wheeze, cough, or visible cyanosis.  No visible retractions or increased work of breathing.    SKIN: Visible skin clear. No significant rash, abnormal pigmentation or lesions.  NEURO: Cranial nerves grossly intact.  Mentation and speech appropriate for age.  PSYCH: Mentation appears normal, affect normal/bright, judgement and insight intact, normal speech and appearance well-groomed.        Video-Visit Details    Type of service:  Video Visit     Originating Location (pt. Location): Home    Distant Location (provider location):  On-site  Platform used for Video Visit: [employer]

## 2023-11-01 ENCOUNTER — VIRTUAL VISIT (OUTPATIENT)
Dept: ENDOCRINOLOGY | Facility: CLINIC | Age: 38
End: 2023-11-01
Payer: COMMERCIAL

## 2023-11-01 VITALS — BODY MASS INDEX: 44.73 KG/M2 | WEIGHT: 262 LBS | HEIGHT: 64 IN

## 2023-11-01 DIAGNOSIS — E11.9 TYPE 2 DIABETES MELLITUS WITHOUT COMPLICATION, UNSPECIFIED WHETHER LONG TERM INSULIN USE (H): ICD-10-CM

## 2023-11-01 PROCEDURE — 99204 OFFICE O/P NEW MOD 45 MIN: CPT | Mod: 95 | Performed by: PHYSICIAN ASSISTANT

## 2023-11-01 ASSESSMENT — PAIN SCALES - GENERAL: PAINLEVEL: NO PAIN (0)

## 2023-11-01 NOTE — NURSING NOTE
Is the patient currently in the state of [place]? YES    Visit mode:VIDEO    If the visit is dropped, the patient can be reconnected by: VIDEO VISIT: Text to cell phone:   Telephone Information:   Mobile [phone]       Will anyone else be joining the visit? NO  (If patient encounters technical issues they should call [phone]    How would you like to obtain your AVS? MyChart    Are changes needed to the allergy or medication list? No    Reason for visit: Consult    Liban OCAMPO

## 2023-11-01 NOTE — LETTER
11/1/2023         RE: Mary Alice Jordan  [id number]   [place] [address]        Dear Colleague,    Thank you for referring your patient, Mary Alice Jordan, to the [facility]. Please see a copy of my visit note below.    Outcome for 10/24/23 8:24 AM: e27 message sent  Itzel Ramírez MA  Outcome for 10/30/23 2:17 PM: Per patient, will upload device before appointment  Beth Fenton MA  Outcome for 10/30/23 3:27 PM: Data obtained via [employer] website  Beth Fenton MA      Patient is showing 4/5 [place] met. Not on statin   Beth Fenton MA              Virtual Visit Details    Type of service:  Video Visit     Originating Location (pt. Location): Home    Distant Location (provider location):  Off-site  Platform used for Video Visit: [employer]    Assessment/Plan :   Type 2 DM. Mary Alice is doing great. We reviewed the importance of diet and exercise in the management of diabetes. We also discussed the benefit of medication, in order to help stabilize her blood sugars. While her A1C has improved, I think she should go forward and start metformin. We reviewed the possible adverse effects and we discussed when to take the medication. She plans on trying it this weekend. We also discussed the importance of keeping her hemoglobin A1C under 7%, in order to avoid future complications. At present time, she is doing great and I do not see any reason to follow-up. However, if her blood sugars should increase or if she has any problems moving forward, she should reach out to our office to schedule a follow-up visit.  Obesity. We discussed how metformin can contribute to weight loss. We also discussed that there are other diabetes medications that would be more benficial in weight loss, such as Ozempic. She would prefer to start with metformin which I a great decision. If she would like to switch to another medication, in the future, she can reach out to our office.    Due to the  COVID 19 pandemic this visit was a telephone/video visit in order to help prevent spread of infection in this patient and the general population. The patient gave verbal consent for the visit today. I have independently reviewed and interpreted labs, imaging as indicated.       Distant Location (provider location):  Off-site  Mode of Communication:  Video Conference via [employer]  Chart review/prep time 10   Joined the call at 11/1/2023, 7:25:30 am.  Left the call at 11/1/2023, 7:37:21 am.  You were on the call for 11 minutes 51 seconds .  25 minutes spent on the date of the encounter doing chart review, history and exam, documentation and further activities as noted above.      Chief complaint:  Mary Alice is a 38 year old female seen in consultation at the request of Dr. Escobar for new onset Type 2 DM.    I have reviewed Care Everywhere including [facility], [facility], [place], [facility], [facility] , [facility], [place] lab reports, imaging reports and provider notes as indicated.      HISTORY OF PRESENT ILLNESS  Mary Alice was diagnosed with gestational diabetes during her last pregnancy in 2021. She used insulin and diet to manage her blood sugars during her pregnancy. After delivery, she was able to discontinue the insulin and she went back to her usual diet. She is not sure if her A1C was checked in follow-up but she noticed that over time she started to develop some of the same symptoms as during pregnancy. She decided to check her blood sugar and her readings were elevated. She scheduled a follow-up visit with her primary care provider and earlier this year, she was told that her A1C was elevated at 6.8% and that she has Type 2 DM.    Throughout this year she has worked hard to improve her diet. She remembered some of the good food choices during pregnancy. She has also been monitoring her blood sugars closely with the FreeStyle Zi sensor. She has been able to get her A1C down to  6.3% through diet alone. She has continued to work closely with her primary care provider and they discussed starting a low dose of metformin. She has not started it yet, because she was worried about the possibility of stomach discomfort.     Mary Alice has not had any problems with severe hyperglycemia and/or hypoglycemia. She has had some lower readings. She has also had a few episodes where she will feel hypoglycemic. This usually occurs when she is trying to get her blood sugars down, after a post-prandial spike. She denies any problems with blurred vision or worsening numbness/tingling in her feet.    Endocrine relevant labs are as follows:   Latest Reference Range & Units 10/26/23 10:53   Hemoglobin A1C 0.0 - 5.6 % 6.3 (H)   (H): Data is abnormally high   Latest Reference Range & Units 07/26/23 07:07   Albumin Urine mg/g Cr 0.00 - 25.00 mg/g Cr 53.06 (H)   (H): Data is abnormally high   Latest Reference Range & Units 07/26/23 07:07   Albumin Urine mg/L mg/L 54.6     REVIEW OF SYSTEMS    Endocrine: positive for diabetes  Skin: negative for, hyperpigmentation  Eyes: negative for, visual blurring, redness, tearing  Ears/Nose/Throat: negative  Respiratory: No shortness of breath, dyspnea on exertion, cough, or hemoptysis  Cardiovascular: negative for, chest pain, lower extremity edema, and exercise intolerance  Gastrointestinal: positive for IBS, negative for, nausea, vomiting, constipation, and diarrhea  Genitourinary: negative for, nocturia, dysuria, frequency, and urgency  Musculoskeletal: negative for, muscular weakness, nocturnal cramping, and foot pain  Neurologic: negative for, local weakness, numbness or tingling of hands, and numbness or tingling of feet  Psychiatric: negative  Hematologic/Lymphatic/Immunologic: negative    Past Medical History  Past Medical History:   Diagnosis Date     Anxiety      Chickenpox      Diabetes (H) 2015     Irritable bowel syndrome (IBS)        Medications  Current Outpatient  Medications   Medication Sig Dispense Refill     amLODIPine (NORVASC) 2.5 MG tablet Take 2 tablets (5 mg) by mouth daily 60 tablet 3     blood glucose (NO BRAND SPECIFIED) lancets standard Use to test blood sugar 4 times daily or as directed. 100 each 3     blood glucose (NO BRAND SPECIFIED) test strip Use to test blood sugar 4 times daily or as directed. 100 strip 3     blood glucose monitoring (NO BRAND SPECIFIED) meter device kit Use to test blood sugar 3 times daily or as directed. 3 kit 3     Continuous Blood Gluc Sensor (FREESTYLE RADHA 2 SENSOR) MISC Inject 1 each Subcutaneous every 14 days Use 1 sensor every 14 days. Use to read blood sugars per 's instructions. 2 each 5     Continuous Blood Gluc Sensor (FREESTYLE RADHA 3 SENSOR) MISC 1 Device every 14 days Will use Abbott's free trial voucher & bring voucher to pharmacy when picking up 1 each 0     Continuous Blood Gluc Sensor (FREESTYLE RADHA 3 SENSOR) MISC 1 Device every 14 days If not approved by insurance, she will use the Abbott Voucher for 2 for 75$ 2 each 11     escitalopram (LEXAPRO) 20 MG tablet Take 1 tablet (20 mg) by mouth daily 90 tablet 0     glipiZIDE (GLUCOTROL XL) 5 MG 24 hr tablet Take 1 tablet (5 mg) by mouth daily (Patient not taking: Reported on 10/26/2023) 90 tablet 0     hydrOXYzine (ATARAX) 10 MG tablet Take 1 tablet (10 mg) by mouth 3 times daily as needed for anxiety 90 tablet 1     metFORMIN (GLUCOPHAGE XR) 500 MG 24 hr tablet Take 1 tablet (500 mg) by mouth daily (with dinner) 90 tablet 3     SUMAtriptan (IMITREX) 25 MG tablet Take 1 tablet (25 mg) by mouth at onset of headache for migraine May repeat in 2 hours. Max 8 tablets/24 hours. 18 tablet 1     topiramate (TOPAMAX) 25 MG tablet Take 1 tablet (25 mg) by mouth 2 times daily 60 tablet 1     trimethoprim-polymyxin b (POLYTRIM) 72314-6.1 UNIT/ML-% ophthalmic solution Place 1-2 drops Into the left eye every 4 hours 10 mL 0     VITAMIN D PO          Allergies  No  Known Allergies    Family History  family history includes Cancer in her maternal grandfather; Cerebrovascular Disease in her mother; Diabetes in her father and mother; Heart Disease in her father; Hypertension in her mother.    Social History  Social History     Tobacco Use     Smoking status: Former     Packs/day: 0     Types: Cigarettes     Smokeless tobacco: Never   Vaping Use     Vaping Use: Never used   Substance Use Topics     Alcohol use: Not Currently     Comment: quit with pregnancy     Drug use: No       Physical Exam  Body mass index is 44.62 kg/m .  GENERAL: no distress  SKIN: Visible skin clear. No significant rash, abnormal pigmentation or lesions.  EYES: Eyes grossly normal to inspection.  No discharge or erythema, or obvious scleral/conjunctival abnormalities.  NECK: visible goiter is not present; no visible neck masses  RESP: No audible wheeze, cough, or visible cyanosis.  No visible retractions or increased work of breathing.    NEURO: Awake, alert, responds appropriately to questions.  Mentation and speech fluent.  PSYCH:affect normal and appearance well-groomed.      DATA REVIEW  Freestyle LibreView  Time in target range 97%  High 3%  Current Ave  mg/dl        Again, thank you for allowing me to participate in the care of your patient.        Sincerely,        Lucero Luevaon PA-C

## 2023-11-01 NOTE — PROGRESS NOTES
Virtual Visit Details    Type of service:  Video Visit     Originating Location (pt. Location): Home    Distant Location (provider location):  Off-site  Platform used for Video Visit: [employer]    Assessment/Plan :   Type 2 DM. Mary Alice is doing great. We reviewed the importance of diet and exercise in the management of diabetes. We also discussed the benefit of medication, in order to help stabilize her blood sugars. While her A1C has improved, I think she should go forward and start metformin. We reviewed the possible adverse effects and we discussed when to take the medication. She plans on trying it this weekend. We also discussed the importance of keeping her hemoglobin A1C under 7%, in order to avoid future complications. At present time, she is doing great and I do not see any reason to follow-up. However, if her blood sugars should increase or if she has any problems moving forward, she should reach out to our office to schedule a follow-up visit.  Obesity. We discussed how metformin can contribute to weight loss. We also discussed that there are other diabetes medications that would be more benficial in weight loss, such as Ozempic. She would prefer to start with metformin which I a great decision. If she would like to switch to another medication, in the future, she can reach out to our office.    Due to the COVID 19 pandemic this visit was a telephone/video visit in order to help prevent spread of infection in this patient and the general population. The patient gave verbal consent for the visit today. I have independently reviewed and interpreted labs, imaging as indicated.       Distant Location (provider location):  Off-site  Mode of Communication:  Video Conference via [employer]  Chart review/prep time 10   Joined the call at 11/1/2023, 7:25:30 am.  Left the call at 11/1/2023, 7:37:21 am.  You were on the call for 11 minutes 51 seconds .  25 minutes spent on the date of the encounter doing chart review,  history and exam, documentation and further activities as noted above.      Chief complaint:  Mary Alice is a 38 year old female seen in consultation at the request of Dr. Escobar for new onset Type 2 DM.    I have reviewed Care Everywhere including [facility], [facility], [facility], [facility], [facility], [facility] , [facility], [place] lab reports, imaging reports and provider notes as indicated.      HISTORY OF PRESENT ILLNESS  Mary Alice was diagnosed with gestational diabetes during her last pregnancy in 2021. She used insulin and diet to manage her blood sugars during her pregnancy. After delivery, she was able to discontinue the insulin and she went back to her usual diet. She is not sure if her A1C was checked in follow-up but she noticed that over time she started to develop some of the same symptoms as during pregnancy. She decided to check her blood sugar and her readings were elevated. She scheduled a follow-up visit with her primary care provider and earlier this year, she was told that her A1C was elevated at 6.8% and that she has Type 2 DM.    Throughout this year she has worked hard to improve her diet. She remembered some of the good food choices during pregnancy. She has also been monitoring her blood sugars closely with the FreeStyle Zi sensor. She has been able to get her A1C down to 6.3% through diet alone. She has continued to work closely with her primary care provider and they discussed starting a low dose of metformin. She has not started it yet, because she was worried about the possibility of stomach discomfort.     Mary Alice has not had any problems with severe hyperglycemia and/or hypoglycemia. She has had some lower readings. She has also had a few episodes where she will feel hypoglycemic. This usually occurs when she is trying to get her blood sugars down, after a post-prandial spike. She denies any problems with blurred vision or worsening numbness/tingling in her  feet.    Endocrine relevant labs are as follows:   Latest Reference Range & Units 10/26/23 10:53   Hemoglobin A1C 0.0 - 5.6 % 6.3 (H)   (H): Data is abnormally high   Latest Reference Range & Units 07/26/23 07:07   Albumin Urine mg/g Cr 0.00 - 25.00 mg/g Cr 53.06 (H)   (H): Data is abnormally high   Latest Reference Range & Units 07/26/23 07:07   Albumin Urine mg/L mg/L 54.6     REVIEW OF SYSTEMS    Endocrine: positive for diabetes  Skin: negative for, hyperpigmentation  Eyes: negative for, visual blurring, redness, tearing  Ears/Nose/Throat: negative  Respiratory: No shortness of breath, dyspnea on exertion, cough, or hemoptysis  Cardiovascular: negative for, chest pain, lower extremity edema, and exercise intolerance  Gastrointestinal: positive for IBS, negative for, nausea, vomiting, constipation, and diarrhea  Genitourinary: negative for, nocturia, dysuria, frequency, and urgency  Musculoskeletal: negative for, muscular weakness, nocturnal cramping, and foot pain  Neurologic: negative for, local weakness, numbness or tingling of hands, and numbness or tingling of feet  Psychiatric: negative  Hematologic/Lymphatic/Immunologic: negative    Past Medical History  Past Medical History:   Diagnosis Date    Anxiety     Chickenpox     Diabetes (H) 2015    Irritable bowel syndrome (IBS)        Medications  Current Outpatient Medications   Medication Sig Dispense Refill    amLODIPine (NORVASC) 2.5 MG tablet Take 2 tablets (5 mg) by mouth daily 60 tablet 3    blood glucose (NO BRAND SPECIFIED) lancets standard Use to test blood sugar 4 times daily or as directed. 100 each 3    blood glucose (NO BRAND SPECIFIED) test strip Use to test blood sugar 4 times daily or as directed. 100 strip 3    blood glucose monitoring (NO BRAND SPECIFIED) meter device kit Use to test blood sugar 3 times daily or as directed. 3 kit 3    Continuous Blood Gluc Sensor (FREESTYLE RADHA 2 SENSOR) [facility] [place]ject 1 each Subcutaneous every 14 days Use 1  sensor every 14 days. Use to read blood sugars per 's instructions. 2 each 5    Continuous Blood Gluc Sensor (FREESTYLE RADHA 3 SENSOR) MISC 1 Device every 14 days Will use [employer]'s free trial voucher & bring voucher to pharmacy when picking up 1 each 0    Continuous Blood Gluc Sensor (FREESTYLE RADHA 3 SENSOR) MISC 1 Device every 14 days If not approved by insurance, she will use the [employer] Voucher for 2 for 75$ 2 each 11    escitalopram (LEXAPRO) 20 MG tablet Take 1 tablet (20 mg) by mouth daily 90 tablet 0    glipiZIDE (GLUCOTROL XL) 5 MG 24 hr tablet Take 1 tablet (5 mg) by mouth daily (Patient not taking: Reported on 10/26/2023) 90 tablet 0    hydrOXYzine (ATARAX) 10 MG tablet Take 1 tablet (10 mg) by mouth 3 times daily as needed for anxiety 90 tablet 1    metFORMIN (GLUCOPHAGE XR) 500 MG 24 hr tablet Take 1 tablet (500 mg) by mouth daily (with dinner) 90 tablet 3    SUMAtriptan (IMITREX) 25 MG tablet Take 1 tablet (25 mg) by mouth at onset of headache for migraine May repeat in 2 hours. Max 8 tablets/24 hours. 18 tablet 1    topiramate (TOPAMAX) 25 MG tablet Take 1 tablet (25 mg) by mouth 2 times daily 60 tablet 1    trimethoprim-polymyxin b (POLYTRIM) 39870-6.1 UNIT/ML-% ophthalmic solution Place 1-2 drops Into the left eye every 4 hours 10 mL 0    VITAMIN D PO          Allergies  No Known Allergies    Family History  family history includes Cancer in her maternal grandfather; Cerebrovascular Disease in her mother; Diabetes in her father and mother; Heart Disease in her father; Hypertension in her mother.    Social History  Social History     Tobacco Use    Smoking status: Former     Packs/day: 0     Types: Cigarettes    Smokeless tobacco: Never   Vaping Use    Vaping Use: Never used   Substance Use Topics    Alcohol use: Not Currently     Comment: quit with pregnancy    Drug use: No       Physical Exam  Body mass index is 44.62 kg/m .  GENERAL: no distress  SKIN: Visible skin clear. No  significant rash, abnormal pigmentation or lesions.  EYES: Eyes grossly normal to inspection.  No discharge or erythema, or obvious scleral/conjunctival abnormalities.  NECK: visible goiter is not present; no visible neck masses  RESP: No audible wheeze, cough, or visible cyanosis.  No visible retractions or increased work of breathing.    NEURO: Awake, alert, responds appropriately to questions.  Mentation and speech fluent.  PSYCH:affect normal and appearance well-groomed.      DATA REVIEW  Freestyle LibreView  Time in target range 97%  High 3%  Current Ave  mg/dl

## 2023-11-11 ENCOUNTER — MYC REFILL (OUTPATIENT)
Dept: FAMILY MEDICINE | Facility: CLINIC | Age: 38
End: 2023-11-11
Payer: COMMERCIAL

## 2023-11-11 DIAGNOSIS — E11.9 TYPE 2 DIABETES MELLITUS WITHOUT COMPLICATION, WITHOUT LONG-TERM CURRENT USE OF INSULIN (H): ICD-10-CM

## 2023-11-11 DIAGNOSIS — E11.9 TYPE 2 DIABETES MELLITUS WITHOUT COMPLICATIONS (H): ICD-10-CM

## 2023-11-13 RX ORDER — BLOOD SUGAR DIAGNOSTIC
STRIP MISCELLANEOUS
Qty: 400 STRIP | Refills: 1 | Status: SHIPPED | OUTPATIENT
Start: 2023-11-13 | End: 2024-03-07

## 2023-12-01 ENCOUNTER — MYC REFILL (OUTPATIENT)
Dept: FAMILY MEDICINE | Facility: CLINIC | Age: 38
End: 2023-12-01
Payer: COMMERCIAL

## 2023-12-01 DIAGNOSIS — I10 BENIGN ESSENTIAL HYPERTENSION: ICD-10-CM

## 2023-12-01 DIAGNOSIS — F41.9 ANXIETY: ICD-10-CM

## 2023-12-01 RX ORDER — ESCITALOPRAM OXALATE 20 MG/1
20 TABLET ORAL DAILY
Qty: 90 TABLET | Refills: 0 | Status: SHIPPED | OUTPATIENT
Start: 2023-12-01 | End: 2024-02-25

## 2023-12-01 RX ORDER — AMLODIPINE BESYLATE 2.5 MG/1
5 TABLET ORAL DAILY
Qty: 60 TABLET | Refills: 3 | Status: SHIPPED | OUTPATIENT
Start: 2023-12-01 | End: 2024-04-07

## 2024-01-18 ENCOUNTER — PATIENT OUTREACH (OUTPATIENT)
Dept: CARE COORDINATION | Facility: CLINIC | Age: 39
End: 2024-01-18
Payer: COMMERCIAL

## 2024-01-24 ENCOUNTER — TRANSFERRED RECORDS (OUTPATIENT)
Dept: MULTI SPECIALTY CLINIC | Facility: CLINIC | Age: 39
End: 2024-01-24

## 2024-01-24 LAB — RETINOPATHY: NORMAL

## 2024-02-01 ENCOUNTER — PATIENT OUTREACH (OUTPATIENT)
Dept: CARE COORDINATION | Facility: CLINIC | Age: 39
End: 2024-02-01
Payer: COMMERCIAL

## 2024-02-25 ENCOUNTER — MYC REFILL (OUTPATIENT)
Dept: FAMILY MEDICINE | Facility: CLINIC | Age: 39
End: 2024-02-25
Payer: COMMERCIAL

## 2024-02-25 DIAGNOSIS — F41.9 ANXIETY: ICD-10-CM

## 2024-02-26 RX ORDER — ESCITALOPRAM OXALATE 20 MG/1
20 TABLET ORAL DAILY
Qty: 90 TABLET | Refills: 1 | Status: SHIPPED | OUTPATIENT
Start: 2024-02-26 | End: 2024-09-06

## 2024-03-07 ENCOUNTER — MYC REFILL (OUTPATIENT)
Dept: FAMILY MEDICINE | Facility: CLINIC | Age: 39
End: 2024-03-07
Payer: COMMERCIAL

## 2024-03-07 DIAGNOSIS — E11.9 TYPE 2 DIABETES MELLITUS WITHOUT COMPLICATIONS (H): ICD-10-CM

## 2024-03-07 DIAGNOSIS — F41.9 ANXIETY: ICD-10-CM

## 2024-03-07 RX ORDER — ESCITALOPRAM OXALATE 20 MG/1
20 TABLET ORAL DAILY
Qty: 90 TABLET | Refills: 1 | Status: CANCELLED | OUTPATIENT
Start: 2024-03-07

## 2024-03-08 RX ORDER — BLOOD SUGAR DIAGNOSTIC
STRIP MISCELLANEOUS
Qty: 400 STRIP | Refills: 1 | Status: SHIPPED | OUTPATIENT
Start: 2024-03-08

## 2024-04-07 ENCOUNTER — HEALTH MAINTENANCE LETTER (OUTPATIENT)
Age: 39
End: 2024-04-07

## 2024-04-07 ENCOUNTER — MYC REFILL (OUTPATIENT)
Dept: FAMILY MEDICINE | Facility: CLINIC | Age: 39
End: 2024-04-07
Payer: COMMERCIAL

## 2024-04-07 DIAGNOSIS — I10 BENIGN ESSENTIAL HYPERTENSION: ICD-10-CM

## 2024-04-08 RX ORDER — AMLODIPINE BESYLATE 2.5 MG/1
5 TABLET ORAL DAILY
Qty: 60 TABLET | Refills: 3 | Status: SHIPPED | OUTPATIENT
Start: 2024-04-08 | End: 2024-08-06

## 2024-05-29 ENCOUNTER — OFFICE VISIT (OUTPATIENT)
Dept: FAMILY MEDICINE | Facility: CLINIC | Age: 39
End: 2024-05-29
Payer: COMMERCIAL

## 2024-05-29 VITALS
HEART RATE: 78 BPM | OXYGEN SATURATION: 98 % | RESPIRATION RATE: 18 BRPM | DIASTOLIC BLOOD PRESSURE: 84 MMHG | HEIGHT: 64 IN | WEIGHT: 279 LBS | SYSTOLIC BLOOD PRESSURE: 128 MMHG | TEMPERATURE: 98.1 F | BODY MASS INDEX: 47.63 KG/M2

## 2024-05-29 DIAGNOSIS — E66.01 CLASS 3 SEVERE OBESITY DUE TO EXCESS CALORIES WITH SERIOUS COMORBIDITY AND BODY MASS INDEX (BMI) OF 45.0 TO 49.9 IN ADULT (H): ICD-10-CM

## 2024-05-29 DIAGNOSIS — E11.9 TYPE 2 DIABETES MELLITUS WITHOUT COMPLICATION, UNSPECIFIED WHETHER LONG TERM INSULIN USE (H): Primary | ICD-10-CM

## 2024-05-29 DIAGNOSIS — E66.813 CLASS 3 SEVERE OBESITY DUE TO EXCESS CALORIES WITH SERIOUS COMORBIDITY AND BODY MASS INDEX (BMI) OF 45.0 TO 49.9 IN ADULT (H): ICD-10-CM

## 2024-05-29 DIAGNOSIS — F41.9 ANXIETY: ICD-10-CM

## 2024-05-29 PROCEDURE — 99214 OFFICE O/P EST MOD 30 MIN: CPT | Performed by: FAMILY MEDICINE

## 2024-05-29 RX ORDER — ARIPIPRAZOLE 2 MG/1
2 TABLET ORAL DAILY
Qty: 30 TABLET | Refills: 0 | Status: SHIPPED | OUTPATIENT
Start: 2024-05-29

## 2024-05-29 SDOH — HEALTH STABILITY: PHYSICAL HEALTH: ON AVERAGE, HOW MANY MINUTES DO YOU ENGAGE IN EXERCISE AT THIS LEVEL?: 30 MIN

## 2024-05-29 SDOH — HEALTH STABILITY: PHYSICAL HEALTH: ON AVERAGE, HOW MANY DAYS PER WEEK DO YOU ENGAGE IN MODERATE TO STRENUOUS EXERCISE (LIKE A BRISK WALK)?: 5 DAYS

## 2024-05-29 ASSESSMENT — ANXIETY QUESTIONNAIRES
3. WORRYING TOO MUCH ABOUT DIFFERENT THINGS: NEARLY EVERY DAY
5. BEING SO RESTLESS THAT IT IS HARD TO SIT STILL: SEVERAL DAYS
GAD7 TOTAL SCORE: 17
7. FEELING AFRAID AS IF SOMETHING AWFUL MIGHT HAPPEN: NEARLY EVERY DAY
GAD7 TOTAL SCORE: 17
1. FEELING NERVOUS, ANXIOUS, OR ON EDGE: NEARLY EVERY DAY
GAD7 TOTAL SCORE: 17
IF YOU CHECKED OFF ANY PROBLEMS ON THIS QUESTIONNAIRE, HOW DIFFICULT HAVE THESE PROBLEMS MADE IT FOR YOU TO DO YOUR WORK, TAKE CARE OF THINGS AT HOME, OR GET ALONG WITH OTHER PEOPLE: SOMEWHAT DIFFICULT
4. TROUBLE RELAXING: MORE THAN HALF THE DAYS
8. IF YOU CHECKED OFF ANY PROBLEMS, HOW DIFFICULT HAVE THESE MADE IT FOR YOU TO DO YOUR WORK, TAKE CARE OF THINGS AT HOME, OR GET ALONG WITH OTHER PEOPLE?: SOMEWHAT DIFFICULT
2. NOT BEING ABLE TO STOP OR CONTROL WORRYING: NEARLY EVERY DAY
7. FEELING AFRAID AS IF SOMETHING AWFUL MIGHT HAPPEN: NEARLY EVERY DAY
6. BECOMING EASILY ANNOYED OR IRRITABLE: MORE THAN HALF THE DAYS

## 2024-05-29 ASSESSMENT — SOCIAL DETERMINANTS OF HEALTH (SDOH): HOW OFTEN DO YOU GET TOGETHER WITH FRIENDS OR RELATIVES?: ONCE A WEEK

## 2024-05-29 ASSESSMENT — PATIENT HEALTH QUESTIONNAIRE - PHQ9: SUM OF ALL RESPONSES TO PHQ QUESTIONS 1-9: 3

## 2024-05-29 ASSESSMENT — PAIN SCALES - GENERAL: PAINLEVEL: NO PAIN (0)

## 2024-05-29 ASSESSMENT — ENCOUNTER SYMPTOMS: NERVOUS/ANXIOUS: 1

## 2024-05-29 NOTE — PROGRESS NOTES
"  Assessment & Plan   Problem List Items Addressed This Visit       Anxiety    Relevant Medications    ARIPiprazole (ABILIFY) 2 MG tablet    Type 2 diabetes mellitus without complication, unspecified whether long term insulin use (H) - Primary    Relevant Orders    Lipid panel reflex to direct LDL Fasting    Basic metabolic panel  (Ca, Cl, CO2, Creat, Gluc, K, Na, BUN)    Albumin Random Urine Quantitative with Creat Ratio    Hemoglobin A1c     Other Visit Diagnoses       Class 3 severe obesity due to excess calories with serious comorbidity and body mass index (BMI) of 45.0 to 49.9 in adult (H)        Relevant Orders    Adult Nutrition  Referral           Patient is a 39 yr old female here for diabetes recheck. She has not been on her medications for a couple of months.   Patient will come in for labs tomorrow.   She also is interested in Ozempic for weight loss. She is encouraged to eat healthy.    Patient has been advised of split billing requirements and indicates understanding: Yes       BMI  Estimated body mass index is 47.89 kg/m  as calculated from the following:    Height as of this encounter: 1.626 m (5' 4\").    Weight as of this encounter: 126.6 kg (279 lb).       Counseling  Appropriate preventive services were discussed with this patient, including applicable screening as appropriate for fall prevention, nutrition, physical activity, Tobacco-use cessation, weight loss and cognition.  Checklist reviewing preventive services available has been given to the patient.  Reviewed patient's diet, addressing concerns and/or questions.     FUTURE APPOINTMENTS:       - Follow-up visit as needed.      Chau Wheat is a 39 year old, presenting for the following health issues:    Patient is a 39-year-old with past medical history significant for hypertension, type 2 diabetes, anxiety, obesity.  She comes in today for recheck of her diabetes.  She has not been on her medications because of side effects " so she was mainly using her diet as a way to control the diabetes.  Lately she has been spot checking her blood sugars and they have ranged from 1 70-2 50.  She is thinking she may need to go back on her medications.  She also says she has gained significant weight.  She mentioned some side effects to possibly the glipizide which she is to crash her blood sugars.  At some point she was also on metformin.  She mentioned that her anxiety level has increased in the last couple of months.  She was in therapy for a while which helped but then she found out that insurance did not cover it.  She is wondering if we can offer her medications.  I recommended adding Abilify to augment her medications and she is open to this.  Lastly she wanted to see if she can start Ozempic for weight loss I want her to do this alongside a nutrition consult and initiate referral was placed today.  I also encouraged her to continue to do small exercises here and there to [employer].  No other symptoms were reported.  Diabetes (Recheck on diabetes.  She is not taking the Glipizide or Metformin.  States she was having issues with her blood sugar going low during the night.  States she sent a message to the care team and was told to stop the Glipizide.  She is not sure if she should be taking the Metformin.), Anxiety (Recheck on anxiety./), Health Maintenance (Will schedule her pap and physical in the near future.), and Imm/Inj (Discuss if she is due for a 3rd HPV injection.  If so she can do this today or at her next CPE.)        5/29/2024    10:58 AM   Additional Questions   Roomed by Mariel Ashton CMA     Anxiety    History of Present Illness       Mental Health Follow-up:  Patient presents to follow-up on Anxiety.    Patient's anxiety since last visit has been:  Bad  The patient is having other symptoms associated with anxiety.  Any significant life events: No  Patient is feeling anxious or having panic attacks.  Patient has no concerns about  alcohol or drug use.    Diabetes:   She presents for follow up of diabetes.   She is checking home blood glucose with a continuous glucose monitor.   She checks blood glucose after meals.  Blood glucose is sometimes over 200 and sometimes under 70. She is aware of hypoglycemia symptoms including shakiness, dizziness and weakness.   She is concerned about blood sugar frequently over 200.   She is having weight gain.            She eats 2-3 servings of fruits and vegetables daily.She consumes 1 sweetened beverage(s) daily.She exercises with enough effort to increase her heart rate 20 to 29 minutes per day.  She exercises with enough effort to increase her heart rate 5 days per week.   She is taking medications regularly.       Chief Complaint   Patient presents with    Diabetes     Recheck on diabetes.  She is not taking the Glipizide or Metformin.  States she was having issues with her blood sugar going low during the night.  States she sent a message to the care team and was told to stop the Glipizide.  She is not sure if she should be taking the Metformin.    Anxiety     Recheck on anxiety.      Health Maintenance     Will schedule her pap and physical in the near future.    Imm/Inj     Discuss if she is due for a 3rd HPV injection.  If so she can do this today or at her next CPE.             Review of Systems  CONSTITUTIONAL: NEGATIVE for fever, chills, change in weight  INTEGUMENTARY/SKIN: NEGATIVE for worrisome rashes, moles or lesions  ENT/MOUTH: NEGATIVE for ear, mouth and throat problems  RESP: NEGATIVE for significant cough or SOB  CV: NEGATIVE for chest pain, palpitations or peripheral edema  GI: NEGATIVE for nausea, abdominal pain, heartburn, or change in bowel habits  MUSCULOSKELETAL: NEGATIVE for significant arthralgias or myalgia  NEURO: NEGATIVE for weakness, dizziness or paresthesias  ENDOCRINE: NEGATIVE for temperature intolerance, skin/hair changes  HEME/ALLERGY/IMMUNE: NEGATIVE for bleeding  "problems  PSYCHIATRIC: POSITIVE for  anxiety      Objective    /84 (BP Location: Right arm, Patient Position: Chair, Cuff Size: Adult Large)   Pulse 78   Temp 98.1  F (36.7  C) (Tympanic)   Resp 18   Ht 1.626 m (5' 4\")   Wt 126.6 kg (279 lb)   LMP 05/23/2024 (Exact Date)   SpO2 98%   BMI 47.89 kg/m    Body mass index is 47.89 kg/m .  Physical Exam   GENERAL: alert and no distress  EYES: Eyes grossly normal to inspection, PERRL and conjunctivae and sclerae normal  HENT: ear canals and TM's normal, nose and mouth without ulcers or lesions  NECK: no adenopathy, no asymmetry, masses, or scars  RESP: lungs clear to auscultation - no rales, rhonchi or wheezes  CV: regular rate and rhythm, normal S1 S2, no S3 or S4, no murmur, click or rub, no peripheral edema  ABDOMEN: soft, nontender, no hepatosplenomegaly, no masses and bowel sounds normal  MS: no gross musculoskeletal defects noted, no edema  SKIN: no suspicious lesions or rashes  PSYCH: mentation appears normal, affect normal/bright            Signed Electronically by: Jose Escobar MD    "

## 2024-05-30 ENCOUNTER — LAB (OUTPATIENT)
Dept: LAB | Facility: CLINIC | Age: 39
End: 2024-05-30
Payer: COMMERCIAL

## 2024-05-30 DIAGNOSIS — E11.9 TYPE 2 DIABETES MELLITUS WITHOUT COMPLICATION, UNSPECIFIED WHETHER LONG TERM INSULIN USE (H): ICD-10-CM

## 2024-05-30 DIAGNOSIS — Z11.59 NEED FOR HEPATITIS C SCREENING TEST: Primary | ICD-10-CM

## 2024-05-30 LAB
ANION GAP SERPL CALCULATED.3IONS-SCNC: 10 MMOL/L (ref 7–15)
BUN SERPL-MCNC: 13.2 MG/DL (ref 6–20)
CALCIUM SERPL-MCNC: 9.1 MG/DL (ref 8.6–10)
CHLORIDE SERPL-SCNC: 103 MMOL/L (ref 98–107)
CHOLEST SERPL-MCNC: 157 MG/DL
CREAT SERPL-MCNC: 0.52 MG/DL (ref 0.51–0.95)
CREAT UR-MCNC: 131 MG/DL
DEPRECATED HCO3 PLAS-SCNC: 26 MMOL/L (ref 22–29)
EGFRCR SERPLBLD CKD-EPI 2021: >90 ML/MIN/1.73M2
FASTING STATUS PATIENT QL REPORTED: YES
FASTING STATUS PATIENT QL REPORTED: YES
GLUCOSE SERPL-MCNC: 148 MG/DL (ref 70–99)
HBA1C MFR BLD: 6.9 % (ref 0–5.6)
HDLC SERPL-MCNC: 51 MG/DL
LDLC SERPL CALC-MCNC: 87 MG/DL
MICROALBUMIN UR-MCNC: 63.9 MG/L
MICROALBUMIN/CREAT UR: 48.78 MG/G CR (ref 0–25)
NONHDLC SERPL-MCNC: 106 MG/DL
POTASSIUM SERPL-SCNC: 4.4 MMOL/L (ref 3.4–5.3)
SODIUM SERPL-SCNC: 139 MMOL/L (ref 135–145)
TRIGL SERPL-MCNC: 93 MG/DL

## 2024-05-30 PROCEDURE — 83036 HEMOGLOBIN GLYCOSYLATED A1C: CPT

## 2024-05-30 PROCEDURE — 82043 UR ALBUMIN QUANTITATIVE: CPT

## 2024-05-30 PROCEDURE — 82570 ASSAY OF URINE CREATININE: CPT

## 2024-05-30 PROCEDURE — 80048 BASIC METABOLIC PNL TOTAL CA: CPT

## 2024-05-30 PROCEDURE — 80061 LIPID PANEL: CPT

## 2024-05-30 PROCEDURE — 36415 COLL VENOUS BLD VENIPUNCTURE: CPT

## 2024-06-04 NOTE — RESULT ENCOUNTER NOTE
Josiah Wheat,  Your test result was within normal parameters except the A1C went up to 6.9.   As discussed in clinic start the metformin again at one tablet twice a day.   Urine microalbumin was slight abnormal.  Cholesterol was okay though the numbers went up slightly. Continue to work on diet and exercise.   Thank you.     Jose Escobar M.D.

## 2024-07-04 ENCOUNTER — HOSPITAL ENCOUNTER (EMERGENCY)
Facility: CLINIC | Age: 39
Discharge: HOME | End: 2024-07-04
Attending: EMERGENCY MEDICINE | Admitting: EMERGENCY MEDICINE
Payer: COMMERCIAL

## 2024-07-04 VITALS
SYSTOLIC BLOOD PRESSURE: 148 MMHG | RESPIRATION RATE: 16 BRPM | TEMPERATURE: 97.5 F | BODY MASS INDEX: 47.8 KG/M2 | HEIGHT: 64 IN | OXYGEN SATURATION: 98 % | HEART RATE: 88 BPM | WEIGHT: 280 LBS | DIASTOLIC BLOOD PRESSURE: 82 MMHG

## 2024-07-04 DIAGNOSIS — Z86.39 HISTORY OF DIABETES MELLITUS: ICD-10-CM

## 2024-07-04 DIAGNOSIS — J02.9 SORE THROAT: ICD-10-CM

## 2024-07-04 LAB — GROUP A STREP BY PCR: NOT DETECTED

## 2024-07-04 PROCEDURE — 99284 EMERGENCY DEPT VISIT MOD MDM: CPT | Performed by: EMERGENCY MEDICINE

## 2024-07-04 PROCEDURE — 87651 STREP A DNA AMP PROBE: CPT | Performed by: EMERGENCY MEDICINE

## 2024-07-04 PROCEDURE — 250N000012 HC RX MED GY IP 250 OP 636 PS 637: Performed by: EMERGENCY MEDICINE

## 2024-07-04 PROCEDURE — 99283 EMERGENCY DEPT VISIT LOW MDM: CPT | Performed by: EMERGENCY MEDICINE

## 2024-07-04 RX ADMIN — DEXAMETHASONE 10 MG: 2 TABLET ORAL at 09:43

## 2024-07-04 ASSESSMENT — ENCOUNTER SYMPTOMS
MUSCULOSKELETAL NEGATIVE: 1
RESPIRATORY NEGATIVE: 1
HEMATOLOGIC/LYMPHATIC NEGATIVE: 1
SORE THROAT: 1
EYES NEGATIVE: 1
GASTROINTESTINAL NEGATIVE: 1
CONSTITUTIONAL NEGATIVE: 1
CARDIOVASCULAR NEGATIVE: 1
TROUBLE SWALLOWING: 1
PSYCHIATRIC NEGATIVE: 1
ENDOCRINE NEGATIVE: 1
NEUROLOGICAL NEGATIVE: 1
ALLERGIC/IMMUNOLOGIC NEGATIVE: 1

## 2024-07-04 ASSESSMENT — ACTIVITIES OF DAILY LIVING (ADL): ADLS_ACUITY_SCORE: 35

## 2024-07-04 ASSESSMENT — COLUMBIA-SUICIDE SEVERITY RATING SCALE - C-SSRS
2. HAVE YOU ACTUALLY HAD ANY THOUGHTS OF KILLING YOURSELF IN THE PAST MONTH?: NO
1. IN THE PAST MONTH, HAVE YOU WISHED YOU WERE DEAD OR WISHED YOU COULD GO TO SLEEP AND NOT WAKE UP?: NO
6. HAVE YOU EVER DONE ANYTHING, STARTED TO DO ANYTHING, OR PREPARED TO DO ANYTHING TO END YOUR LIFE?: NO

## 2024-07-04 NOTE — ED PROVIDER NOTES
History     Chief Complaint   Patient presents with    Pharyngitis     HPI  Mary Alice Jordan is a 39 year old female who presents with sore throat and concern for strep infection.  Reviewed the medical record including office visit on 5/29/24-history of type 2 diabetes, morbid obesity, elevated liver enzymes and history of anxiety.      Patient's current prescribed medications were reviewed with the patient at the bedside including Abilify, amlodipine, Lexapro, Atarax Imitrex and Topamax.    On examination patient reports she developed a sore throat 3 days ago and has had some pain with swallowing.  No voice change.  She has a grazyna and has been monitoring her glucose.  Glucose this morning is ranged from 100 to 140.  She was concerned that her son was exposed to strep and she has been managing fever and wanted come in to be checked for strep throat.  She reports no headache no rash no diarrhea     Allergies:  No Known Allergies    Problem List:    Patient Active Problem List    Diagnosis Date Noted    Type 2 diabetes mellitus without complication, unspecified whether long term insulin use (H) 09/27/2021     Priority: Medium    Anxiety 03/09/2020     Priority: Medium    Irregular menses 02/11/2016     Priority: Medium    Type 2 diabetes mellitus without complications (H) 10/23/2015     Priority: Medium     2015 Hgb A1c=6.8  MFM Plan of care:Maintain target levels for blood sugar of equal or less than 90 FBS and 120 2hPP or 140 1hPP. HbA1c levels are of limited utility in pregnancy.   Obtain baseline urine PCR, obtain maternal EKG, maternal ECHO to evaluate heart function if EKG is abnormal.   Follow up anatomy scan in 4 weeks and fetal ECHO in 4 weeks with pediatric cardiology.   Monthly growth scans.  Semiweekly BPP beginning at 32 weeks until delivery by 37-39 weeks depending on glycemic control.         Elevated liver enzymes 10/08/2015     Priority: Medium    Female infertility, secondary 10/06/2015      Priority: Medium    Morbidly obese (H) 08/14/2014     Priority: Medium        Past Medical History:    Past Medical History:   Diagnosis Date    Anxiety     Chickenpox     Diabetes (H) 2015    Irritable bowel syndrome (IBS)        Past Surgical History:    Past Surgical History:   Procedure Laterality Date    TONSILLECTOMY  6th grade       Family History:    Family History   Problem Relation Age of Onset    Diabetes Mother     Hypertension Mother     Cerebrovascular Disease Mother     Heart Disease Father         MI    Diabetes Father     Cancer Maternal Grandfather        Social History:  Marital Status:   [2]  Social History     Tobacco Use    Smoking status: Former     Types: Cigarettes    Smokeless tobacco: Never   Vaping Use    Vaping status: Never Used   Substance Use Topics    Alcohol use: Not Currently     Comment: quit with pregnancy    Drug use: No        Medications:    amLODIPine (NORVASC) 2.5 MG tablet  ARIPiprazole (ABILIFY) 2 MG tablet  blood glucose (NO BRAND SPECIFIED) lancets standard  blood glucose (NO BRAND SPECIFIED) test strip  blood glucose (ONETOUCH VERIO IQ) test strip  blood glucose monitoring (NO BRAND SPECIFIED) meter device kit  Continuous Blood Gluc Sensor (FREESTYLE RADHA 2 SENSOR) MISC  Continuous Blood Gluc Sensor (FREESTYLE RADHA 3 SENSOR) MISC  Continuous Blood Gluc Sensor (FREESTYLE RADHA 3 SENSOR) MISC  escitalopram (LEXAPRO) 20 MG tablet  glucose (BD GLUCOSE) 4 g chewable tablet  hydrOXYzine (ATARAX) 10 MG tablet  SUMAtriptan (IMITREX) 25 MG tablet  topiramate (TOPAMAX) 25 MG tablet  VITAMIN D PO          Review of Systems   Constitutional: Negative.    HENT:  Positive for sore throat and trouble swallowing.    Eyes: Negative.    Respiratory: Negative.     Cardiovascular: Negative.    Gastrointestinal: Negative.    Endocrine: Negative.    Genitourinary: Negative.    Musculoskeletal: Negative.    Skin: Negative.    Allergic/Immunologic: Negative.    Neurological: Negative.  "   Hematological: Negative.    Psychiatric/Behavioral: Negative.     All other systems reviewed and are negative.      Physical Exam   BP: (!) 150/110  Pulse: 94  Temp: 97.5  F (36.4  C)  Resp: 16  Height: 162.6 cm (5' 4\")  Weight: 127 kg (280 lb)  SpO2: 97 %      Physical Exam  Constitutional:       General: She is not in acute distress.     Appearance: She is well-developed. She is not toxic-appearing or diaphoretic.   HENT:      Head: Normocephalic and atraumatic.      Nose: Rhinorrhea present. No congestion.      Mouth/Throat:      Mouth: Mucous membranes are moist. No oral lesions.      Pharynx: No pharyngeal swelling, oropharyngeal exudate, posterior oropharyngeal erythema or uvula swelling.      Tonsils: No tonsillar exudate.   Eyes:      Conjunctiva/sclera: Conjunctivae normal.      Pupils: Pupils are equal, round, and reactive to light.   Cardiovascular:      Rate and Rhythm: Normal rate.   Pulmonary:      Effort: Pulmonary effort is normal. No respiratory distress.      Breath sounds: Normal breath sounds. No stridor. No wheezing, rhonchi or rales.   Chest:      Chest wall: No tenderness.   Abdominal:      General: There is no distension.      Palpations: There is no mass.      Tenderness: There is no abdominal tenderness. There is no guarding or rebound.      Hernia: No hernia is present.   Musculoskeletal:      Cervical back: Normal range of motion.   Skin:     Capillary Refill: Capillary refill takes less than 2 seconds.      Coloration: Skin is not pale.      Findings: No erythema or rash.   Neurological:      General: No focal deficit present.      Mental Status: She is alert and oriented to person, place, and time.   Psychiatric:         Mood and Affect: Mood normal.         Behavior: Behavior normal.         ED Course        Procedures              Critical Care time:  none               ED medications:  Medications   dexAMETHasone (DECADRON) tablet 10 mg (has no administration in time range) " "      ED Vitals:  Vitals:    07/04/24 0819   BP: (!) 150/110   Pulse: 94   Resp: 16   Temp: 97.5  F (36.4  C)   TempSrc: Tympanic   SpO2: 97%   Weight: 127 kg (280 lb)   Height: 1.626 m (5' 4\")      ED labs and imaging:  Results for orders placed or performed during the hospital encounter of 07/04/24   Group A Streptococcus PCR Throat Swab     Status: Normal    Specimen: Throat; Swab   Result Value Ref Range    Group A strep by PCR Not Detected Not Detected    Narrative    The Xpert Xpress Strep A test, performed on the [employer], is a rapid, qualitative in vitro diagnostic test for the detection of Streptococcus pyogenes (Group A ß-hemolytic Streptococcus, Strep A) in throat swab specimens from patients with signs and symptoms of pharyngitis. The Xpert Xpress Strep A test can be used as an aid in the diagnosis of Group A Streptococcal pharyngitis. The assay is not intended to monitor treatment for Group A Streptococcus infections. The Xpert Xpress Strep A test utilizes an automated real-time polymerase chain reaction (PCR) to detect Streptococcus pyogenes DNA.       Assessments & Plan (with Medical Decision Making)   Assessment Summary and clinical Impression: 39-year-old female who presented with concern about strep throat with 3 days with pain with swallowing and her son who was exposed to strep throat at .Patient has a history of type 2 diabetes currently on Ozempic. On arrival she was afebrile.  Blood pressure was 150/110.  On exam voice was normal.  She reported no cough or diarrhea no rash.  No headache. Patient reports her glucoses range from 100 140.  Neck was supple on exam.  Prior tonsillectomy.  No carotid bruit.  With negative strep test we discussed oral dexamethasone for symptomatic management and the risk for hypoglycemia.  She was comfortable with dose of dexamethasone with watchful waiting with suspicion that symptoms likely due to viral etiology given reassuring hearing " exam.  Low threshold to return for reassessment and for care    ED course and plan:  Reviewed the medical record.  Reviewed office visit on 5/29/24.  Negative strep test.  With a reassuring oropharyngeal and neck exam patient was comfortable going home with supportive care management with suspicion that symptoms are likely due to a viral process with empiric dose of dexamethasone during ED course of care with low threshold to return if there are progressive symptoms including voice change, increasing dysphagia or odynophagia or any new concerns. She expressed comfort with plan of care.      Disclaimer: This note consists of symbols derived from keyboarding, dictation and/or voice recognition software. As a result, there may be errors in the script that have gone undetected. Please consider this when interpreting information found in this chart.   I have reviewed the nursing notes.    I have reviewed the findings, diagnosis, plan and need for follow up with the patient.           Medical Decision Making  The patient's presentation was of moderate complexity (throat, history of diabetes on Ozempic).    The patient's evaluation involved:  ordering and/or review of 1 test(s) in this encounter (strep test, oral medication)    The patient's management necessitated moderate risk (discussion of potential hyperglycemia with dexamethasone, watchful waiting follow-up care).      Strep test  New Prescriptions    No medications on file       Final diagnoses:   Sore throat - Over the last 3 days   History of diabetes mellitus - On Ozempic       7/4/2024   [facility] EMERGENCY DEPT       Kody Haji MD  07/04/24 1640

## 2024-07-04 NOTE — ED TRIAGE NOTES
Concern for strep throat     Triage Assessment (Adult)       Row Name 07/04/24 0820          Triage Assessment    Airway WDL WDL        Respiratory WDL    Respiratory WDL WDL        Skin Circulation/Temperature WDL    Skin Circulation/Temperature WDL WDL        Cardiac WDL    Cardiac WDL WDL        Peripheral/Neurovascular WDL    Peripheral Neurovascular WDL WDL        Cognitive/Neuro/Behavioral WDL    Cognitive/Neuro/Behavioral WDL WDL

## 2024-07-04 NOTE — DISCHARGE INSTRUCTIONS
1) Your patient today did not review that you have strep throat based on rapid strep test.  You appear stable for discharge to home with no findings to suggest an airway emergency.  We discussed that your symptoms could be due to a viral etiology.  We have agreed to watchful waiting with supportive cares including throat lozenges, salt water gargles, Tylenol 1000 mg every 8 hours as needed or Motrin ibuprofen with food 4 mg every 12 hours as needed.    2) You the dose of dexamethasone during her ED course of care.  We discussed the potential for raising blood glucose over the next 2 to 4 days with a half-life of dexamethasone.  If there is concern about your blood glucose readings or specifically if your symptoms worsen you should return to be reevaluated.  Based on your exam today there is no clear reason to start antibiotics if symptoms persist in the next 3 to 5 days it may be important to start antibiotics.  Visit with your clinic provider may also be beneficial in the next 3 to 5 days

## 2024-07-05 ENCOUNTER — PATIENT OUTREACH (OUTPATIENT)
Dept: FAMILY MEDICINE | Facility: CLINIC | Age: 39
End: 2024-07-05
Payer: COMMERCIAL

## 2024-07-05 NOTE — TELEPHONE ENCOUNTER
Transitions of Care Outreach  Chief Complaint   Patient presents with    Hospital F/U       Most Recent Admission Date: 7/4/2024   Most Recent Admission Diagnosis:      Most Recent Discharge Date: 7/4/2024   Most Recent Discharge Diagnosis: Sore throat - J02.9  History of diabetes mellitus - Z86.39     Transitions of Care Assessment         Follow up Plan   Pt will cont to monitor. Sx improving. Pt declines follow up appt at this time. Advised to call & be seen in sx not improving.       Future Appointments   Date Time Provider Department Center   9/9/2024 11:00 AM Hali Grullon RD PHN [facility]   12/5/2024  9:15 AM Shanta Cantor PA-C [facility]       Outpatient Plan as outlined on AVS reviewed with patient.    For any urgent concerns, please contact our 24 hour nurse triage line: [phone] ([phone])       Dacia Corbett RN

## 2024-07-21 ENCOUNTER — MYC MEDICAL ADVICE (OUTPATIENT)
Dept: FAMILY MEDICINE | Facility: CLINIC | Age: 39
End: 2024-07-21
Payer: COMMERCIAL

## 2024-07-21 DIAGNOSIS — E11.9 TYPE 2 DIABETES MELLITUS WITHOUT COMPLICATION, WITHOUT LONG-TERM CURRENT USE OF INSULIN (H): ICD-10-CM

## 2024-07-22 NOTE — TELEPHONE ENCOUNTER
Dr. Escobar,    Please see [facility] message below and advise. Last OV 05/29/24.    Last dose sent 06/10/24-07/02/24 0.25 mg every 7 days for 4 doses.    Thank you  Kristopher ESCOBAR RN  [facility]

## 2024-07-23 DIAGNOSIS — E66.813 CLASS 3 SEVERE OBESITY DUE TO EXCESS CALORIES WITH SERIOUS COMORBIDITY AND BODY MASS INDEX (BMI) OF 45.0 TO 49.9 IN ADULT (H): Primary | ICD-10-CM

## 2024-07-23 DIAGNOSIS — E66.01 CLASS 3 SEVERE OBESITY DUE TO EXCESS CALORIES WITH SERIOUS COMORBIDITY AND BODY MASS INDEX (BMI) OF 45.0 TO 49.9 IN ADULT (H): Primary | ICD-10-CM

## 2024-08-04 ENCOUNTER — PATIENT OUTREACH (OUTPATIENT)
Dept: CARE COORDINATION | Facility: CLINIC | Age: 39
End: 2024-08-04
Payer: COMMERCIAL

## 2024-08-06 DIAGNOSIS — I10 BENIGN ESSENTIAL HYPERTENSION: ICD-10-CM

## 2024-08-06 RX ORDER — AMLODIPINE BESYLATE 2.5 MG/1
5 TABLET ORAL DAILY
Qty: 60 TABLET | Refills: 0 | Status: SHIPPED | OUTPATIENT
Start: 2024-08-06 | End: 2024-09-03

## 2024-08-09 ENCOUNTER — NURSE TRIAGE (OUTPATIENT)
Dept: NURSING | Facility: CLINIC | Age: 39
End: 2024-08-09
Payer: COMMERCIAL

## 2024-08-10 NOTE — TELEPHONE ENCOUNTER
Temperature is 101.8 by mouth and is taking ibuprofen  Covid-19 exposure from several co-workers  Symptoms started one day ago on 08/08/24  Scratcy throat no voice today fever    COVID Positive/Requesting COVID treatment    Patient is positive for COVID and requesting treatment options.    Date of positive COVID test (PCR or at home)? Has not taken the test as of now  Current COVID symptoms: fever or chills, fatigue, muscle or body aches, headache, sore throat, nausea or vomiting, and diarrhea  Date COVID symptoms began: 08/08/24    Message should be routed to clinic RN pool. Best phone number to use for call back: [phone]   Reason for Disposition   [1] HIGH RISK patient (e.g., weak immune system, age > 64 years, obesity with BMI 30 or higher, pregnant, chronic lung disease or other chronic medical condition) AND [2] COVID symptoms (e.g., cough, fever)  (Exceptions: Already seen by PCP and no new or worsening symptoms.)    Additional Information   Negative: SEVERE difficulty breathing (e.g., struggling for each breath, speaks in single words)   Negative: Difficult to awaken or acting confused (e.g., disoriented, slurred speech)   Negative: Bluish (or gray) lips or face now   Negative: Shock suspected (e.g., cold/pale/clammy skin, too weak to stand, low BP, rapid pulse)   Negative: Sounds like a life-threatening emergency to the triager   Negative: [1] Diagnosed or suspected COVID-19 AND [2] symptoms lasting 3 or more weeks   Negative: [1] COVID-19 exposure AND [2] no symptoms   Negative: COVID-19 vaccine reaction suspected (e.g., fever, headache, muscle aches) occurring 1 to 3 days after getting vaccine   Negative: COVID-19 vaccine, questions about   Negative: [1] Lives with someone known to have influenza (flu test positive) AND [2] flu-like symptoms (e.g., cough, runny nose, sore throat, SOB; with or without fever)   Negative: [1] Possible COVID-19 symptoms AND [2] triager concerned about severity of symptoms  or other causes   Negative: COVID-19 and breastfeeding, questions about   Negative: SEVERE or constant chest pain or pressure  (Exception: Mild central chest pain, present only when coughing.)   Negative: MODERATE difficulty breathing (e.g., speaks in phrases, SOB even at rest, pulse 100-120)   Negative: [1] Headache AND [2] stiff neck (can't touch chin to chest)   Negative: Oxygen level (e.g., pulse oximetry) 90 percent or lower   Negative: Chest pain or pressure  (Exception: MILD central chest pain, present only when coughing.)   Negative: [1] Drinking very little AND [2] dehydration suspected (e.g., no urine > 12 hours, very dry mouth, very lightheaded)   Negative: Patient sounds very sick or weak to the triager   Negative: MILD difficulty breathing (e.g., minimal/no SOB at rest, SOB with walking, pulse <100)   Negative: Fever > 103 F (39.4 C)   Negative: [1] Fever > 101 F (38.3 C) AND [2] age > 60 years   Negative: [1] Fever > 100.0 F (37.8 C) AND [2] bedridden (e.g., CVA, chronic illness, recovering from surgery)   Negative: Oxygen level (e.g., pulse oximetry) 91 to 94 percent    Protocols used: Coronavirus (COVID-19) Diagnosed or [id number]

## 2024-08-10 NOTE — TELEPHONE ENCOUNTER
Patient is returning call because she wanted to know why she would have to wait until Monday to start treatment.  Informed patient that she could also do a virtual visit if she did not want to wait until Monday.  Patient did not want to wait and wanted to schedule a virtual visit for possible treatment for Covid.  Patient was warm transferred to the .

## 2024-08-11 ENCOUNTER — VIRTUAL VISIT (OUTPATIENT)
Dept: URGENT CARE | Facility: CLINIC | Age: 39
End: 2024-08-11
Payer: COMMERCIAL

## 2024-08-11 DIAGNOSIS — U07.1 INFECTION DUE TO 2019 NOVEL CORONAVIRUS: Primary | ICD-10-CM

## 2024-08-11 PROCEDURE — 99203 OFFICE O/P NEW LOW 30 MIN: CPT | Mod: 95

## 2024-08-11 RX ORDER — BENZONATATE 100 MG/1
100 CAPSULE ORAL 3 TIMES DAILY PRN
Qty: 30 CAPSULE | Refills: 0 | Status: SHIPPED | OUTPATIENT
Start: 2024-08-11

## 2024-08-11 NOTE — PROGRESS NOTES
Mary Alice is a 39 year old female who presents for a billable video visit.    ASSESSMENT/PLAN:  Mary Alice was seen today for infection.    Diagnoses and all orders for this visit:    Infection due to 2019 novel coronavirus  -     nirmatrelvir and ritonavir (PAXLOVID) 300 mg/100 mg therapy pack; Take 3 tablets by mouth 2 times daily for 5 days (Take 2 Nirmatrelvir tablets and 1 Ritonavir tablet twice daily for 5 days)  -     benzonatate (TESSALON) 100 MG capsule; Take 1 capsule (100 mg) by mouth 3 times daily as needed for cough    Patient is 39 year old female who is on day 2 of COVID infection. Symptoms include cough, chills, fever, sore throat, runny nose and diarrhea.  Patient has comorbidities of DM, obesity and HTN.  They are currently taking amlodipine which can interact with Paxlovid. Discussed monitoring BP while taking paxlovid as it can lower the blood pressure. They understand how to monitor the symptoms. She does have a Rx for abilify as well but as she recently started ozempic her provider told her to hold off on starting that medication until they saw how she did on ozempic. Notified patient to continue to hold that medication. She does consent to taking Paxlovid.  GFR > 90.  No other drug drug interaction concerns. Discussed that if covid symptoms worsen to go to UC if significant go to ER.       SUBJECTIVE: Pt reports symptoms started on Friday (2 days ago), positive covid test Friday night. Symptoms include, chills, fever, cough, sore throat, runny nose, diarrhea. Did have some SOB yesterday, but that has resolved.         ROS: Pertinent ROS neg other than the symptoms noted above in the HPI.     OBJECTIVE:  Vitals not done due to this being a virtual visit    GENERAL: healthy, alert and no distress  EYES: Eyes grossly normal to inspection,conjunctivae and sclerae normal  RESP: Able to speak in complete sentences, no audible wheeze or cough  SKIN: no suspicious lesions or rashes  NEURO: mentation  intact and speech normal  PSYCH: mentation appears normal, affect normal/bright    Video-Visit Details    Type of service:  Video Visit  Video Start Time: 0801  Video End Time: 0810    Originating Location: Home    Distant Location:  [facility]     Platform used for Video Visit: JAMESON Verduzco CNP

## 2024-08-14 ENCOUNTER — TELEPHONE (OUTPATIENT)
Dept: FAMILY MEDICINE | Facility: CLINIC | Age: 39
End: 2024-08-14
Payer: COMMERCIAL

## 2024-08-14 DIAGNOSIS — R06.02 SHORTNESS OF BREATH: Primary | ICD-10-CM

## 2024-08-14 RX ORDER — ALBUTEROL SULFATE 90 UG/1
2 AEROSOL, METERED RESPIRATORY (INHALATION) EVERY 4 HOURS PRN
Qty: 18 G | Refills: 3 | Status: SHIPPED | OUTPATIENT
Start: 2024-08-14

## 2024-08-14 NOTE — TELEPHONE ENCOUNTER
S-(situation): Patient calling to inquire if she can get an inhaler prescribed for her +Covid.    B-(background): No history of chronic lung disease. Never used an inhaler before, her children have though.      A-(assessment): Covid + on 08/09/24. Had Virtual visit on 08/11/24. She threw up taking Paxlovid from the metallic taste in her mouth so stopped taking it on 08/13/24. Mary Alice says her fever is gone. Her cough is better but still coughing and worse at night. Her ribs and abdominal muscles are sore.  She is congested yet. She is reporting shortness of breath, sometimes at rest. She has been checking her oxygen saturation and says it is always 97-98%. Denies chest pain.           R-(recommendations): Dr Escobar, will you consider sending an inhaler to [facility] in [place] or will this require a visit?   Thank you, Mildred GARCIA RN       DISPLAY PLAN FREE TEXT

## 2024-08-14 NOTE — TELEPHONE ENCOUNTER
Dr. Escobar,     Patient wanted inhaler sent to [facility] in [place].  I did call and cancel the one sent to [facility].  Please re-order and send to [facility].      Lo Cordova, CMA

## 2024-08-31 DIAGNOSIS — I10 BENIGN ESSENTIAL HYPERTENSION: ICD-10-CM

## 2024-09-03 RX ORDER — AMLODIPINE BESYLATE 2.5 MG/1
5 TABLET ORAL DAILY
Qty: 60 TABLET | Refills: 0 | Status: SHIPPED | OUTPATIENT
Start: 2024-09-03

## 2024-09-03 NOTE — TELEPHONE ENCOUNTER
Has appointment scheduled for 9/11/24.      Requested Prescriptions   Pending Prescriptions Disp Refills    amLODIPine (NORVASC) 2.5 MG tablet [Pharmacy Med Name: amLODIPine Besylate 2.5 MG Oral Tablet] 60 tablet 0     Sig: Take 2 tablets by mouth once daily       Calcium Channel Blockers Protocol  Failed - 8/31/2024  6:06 AM        Failed - Blood pressure under 140/90 in past 12 months     BP Readings from Last 3 Encounters:   07/04/24 (!) 148/82   05/29/24 128/84   10/26/23 122/78       No data recorded            Passed - Medication is active on med list        Passed - GFR is on file in the past 12 months and most recent GFR is normal        Passed - Recent (12 mo) or future (90 days) visit within the authorizing provider's specialty     The patient must have completed an in-person or virtual visit within the past 12 months or has a future visit scheduled within the next 90 days with the authorizing provider s specialty.  Urgent care and e-visits do not quality as an office visit for this protocol.          Passed - Patient is age 18 or older        Passed - No active pregnancy on record        Passed - No positive pregnancy test in past 12 months

## 2024-09-05 DIAGNOSIS — F41.9 ANXIETY: ICD-10-CM

## 2024-09-06 RX ORDER — ESCITALOPRAM OXALATE 20 MG/1
20 TABLET ORAL DAILY
Qty: 90 TABLET | Refills: 1 | Status: SHIPPED | OUTPATIENT
Start: 2024-09-06

## 2024-09-06 NOTE — TELEPHONE ENCOUNTER
Pending Prescriptions:                       Disp   Refills    escitalopram (LEXAPRO) 20 MG tablet [Pharm*90 tab*1        Sig: Take 1 tablet by mouth once daily    Routing refill request to provider for review/approval because:  ROBERT-7 score not in goal range. Has an appt on 9/11/24.        9/11/2023    11:15 AM 10/26/2023    10:51 AM 5/29/2024    11:01 AM   ROBERT-7 SCORE   Total Score  5 (mild anxiety) 17 (severe anxiety)   Total Score 2 5 17       Ashlie Vila RN  [facility]

## 2024-09-09 ENCOUNTER — MYC MEDICAL ADVICE (OUTPATIENT)
Dept: FAMILY MEDICINE | Facility: CLINIC | Age: 39
End: 2024-09-09
Payer: COMMERCIAL

## 2024-09-09 ENCOUNTER — HOSPITAL ENCOUNTER (OUTPATIENT)
Dept: NUTRITION | Facility: CLINIC | Age: 39
Discharge: HOME | End: 2024-09-09
Attending: FAMILY MEDICINE | Admitting: FAMILY MEDICINE
Payer: COMMERCIAL

## 2024-09-09 DIAGNOSIS — E66.01 CLASS 3 SEVERE OBESITY DUE TO EXCESS CALORIES WITH SERIOUS COMORBIDITY AND BODY MASS INDEX (BMI) OF 45.0 TO 49.9 IN ADULT (H): ICD-10-CM

## 2024-09-09 DIAGNOSIS — E66.813 CLASS 3 SEVERE OBESITY DUE TO EXCESS CALORIES WITH SERIOUS COMORBIDITY AND BODY MASS INDEX (BMI) OF 45.0 TO 49.9 IN ADULT (H): ICD-10-CM

## 2024-09-09 PROCEDURE — 97802 MEDICAL NUTRITION INDIV IN: CPT | Mod: GT,95

## 2024-09-09 NOTE — TELEPHONE ENCOUNTER
Dr. Escobar,    Please see request for Ozempic and advise on approval.    Thank you  Kristopher ESCOBAR RN  [facility]

## 2024-09-09 NOTE — PROGRESS NOTES
"OUTPATIENT CLINICAL NUTRITION SERVICES ASSESSMENT    Video-Visit Details     Type of service: Video Visit     Video Start Time (time video started): 11:01 am     Video End Time (time video stopped): 11:19 am approx.     Originating Location (pt. Location): Home      Distant Location (provider location): On-site     Mode of Communication: Video Conference via [employer]    REASON FOR ASSESSMENT  Mary Alice ANNALISA Jordan referred by Jose Escobar MD for MNT related to  Class 3 severe obesity due to excess calories with serious comorbidity and body mass index (BMI) of 45.0 to 49.9 in adult (H) [E66.01, Z68.42]    Overweight/Obesity    Patient accompanied by: N/A      ASSESSMENT   -PMH: morbidly obese, female infertility secondary, T2DM, irregular menses, anxiety    What brings you to our session today? Have you met with an RD before?  Has met with an RD in the past but has been awhile. Is here because recently started ozempic, primary care provider recommended that pt see an RD.     - wondering about dose of ozempic, if it's okay to increase the dose - noticing that appetite-suppressant feature not really working anymore  - looking for variety with meals    Nutritional Details:   -Food allergies: none  -Food sensitivities: none  -GI concerns: none - does feel has had IBS for years ex. diarrhea, more constipated on ozempic now   -Appetite: doesn't feel like ozempic is curbing appetite as much as it did initially   -Pace of eating: eats fast at work, slower at home  -Role of cooking: self  -Role of food shopping: self    Diet Recall:  Breakfast: eggs, protein shake if no time for eggs  Snack: SkinnyPop popcorn, pretzel nuggets, sometimes another protein shake   Lunch: same thing every day - low CHO wrap, chicken, salad mixture, dressing, small bag of chips, 80% of time has roasted veggies  Dinner: whatever feeding family, 90% of time has salad before eating to avoid \"overeating\"  Snack: may have another after " "dinner but depends ex. chips, apple, chips and cheese - depends on craving  Dining out: not very often - maybe once every few months         Physical Activity:  Days per week: N/A  Duration: N/A  Activity type: walks dog for at least 30 minutes daily, has a toddler and is outside a lot   Limitations: none    NUTRITION FOCUSED PHYSICAL ASSESSMENT (NFPA) FOR DIAGNOSING MALNUTRITION  No: unable to adequately assess due to video visit          Observed: unable to adequately assess due to video visit     Obtained from Chart/Interdisciplinary Team: none noted    LABS  Labs reviewed   Latest Reference Range & Units Most Recent 02/07/23 10:47 05/30/24 08:17   Glucose 70 - 99 mg/dL 148 (H)  5/30/24 08:17 111 (H) 148 (H)   (H): Data is abnormally high  *pt fasting for 5/30/24 but unsure if fasting for 2/7/23     Latest Reference Range & Units 02/07/23 10:47 07/26/23 07:07 10/26/23 10:53 05/30/24 08:17   Hemoglobin A1C 0.0 - 5.6 % 6.8 (H) 6.9 (H) 6.3 (H) 6.9 (H)   (H): Data is abnormally high    MEDICATIONS  Medications reviewed - inhaler, norvasc, abilify, tessalon, dextrose, lexapro, lancets, imitrex, topamax, vitamin D    ANTHROPOMETRICS   Height: 1.626 m (5' 4\")  Weight: 127 kg (280 lbs)  BMI (kg/m2): 48.1  Weight Status: Obesity Grade III BMI >40  IBW: 120 lbs  ADJ BW: 160 lbs  %IBW: 233  Weight History:   Wt Readings from Last 15 Encounters:   07/04/24 127 kg (280 lb)   05/29/24 126.6 kg (279 lb)   11/01/23 118.8 kg (262 lb)   10/26/23 118.8 kg (262 lb)   09/11/23 121.3 kg (267 lb 6.4 oz)   07/26/23 122 kg (269 lb)   04/03/23 121.1 kg (267 lb)   02/06/23 121.5 kg (267 lb 12.8 oz)   08/30/22 111.6 kg (246 lb)   06/17/22 111.8 kg (246 lb 6.4 oz)   04/15/22 110.3 kg (243 lb 1.6 oz)   11/08/21 112 kg (247 lb)   10/08/21 118.8 kg (261 lb 12.8 oz)   10/07/21 118.4 kg (261 lb)   09/23/21 123.4 kg (272 lb)   Overall gain noted. Lack of recent weight data. Any loss presumed intentional due to desire for weight management. "     UBW? Changes to your weight recently?  Lost weight right after ozempic, not losing weight as much as previously.     Dosing weight: 72.7 kg using ADJ BW    ASSESSED NUTRITION NEEDS  Estimated Energy Needs: 1,818-2,181 kcals/day (25-30 Kcal/Kg)  Justification: (obese)  Estimated Protein Needs: 58-73 grams protein/day (0.8-1 g pro/Kg)  Justification: (preservation of lean body mass)  Estimated Fluid Needs: 2,181 mL/day (30 mL/kg)    ASSESSED MALNUTRITION STATUS  % Weight Loss: Weight loss does not meet criteria for malnutrition - if present, presumed intentional due to desire for weight management  % Intake: Decreased intake does not meet criteria for malnutrition - if present, presumed intentional due to desire for weight management  Subcutaneous Fat Loss: Unable to adequately assess  Loss of Muscle Mass: Unable to adequately assess  Fluid Retention: None noted    Malnutrition Diagnosis: Unable to determine due to lack of adequate NFPE related to video visit     DIAGNOSIS   Nutrition Diagnosis: Food and nutrition-related knowledge deficit related to weight management as evidenced by referral to meet with RD      INTERVENTIONS   Nutrition Prescription: general, healthy nutrition recommendations following a whole foods approach, focusing on MyPlate method for guidance. Ensuring intake of plenty of fruits and vegetables, of a variety of colors and types to promote antioxidant, vitamin/mineral intake. Focusing on lean proteins and avoiding foods that are high in saturated, trans fat. Implementing plenty of plant-based proteins such as nuts, seeds, legumes, and beans. Importance of WGs to promote bowel regularity via insoluble fiber intake. Soluble fiber regularly to help lower cholesterol levels. Focusing on reducing overall refined sugar, high calorie foods. Mindfulness with eating to determine when eating, why, how much.       IMPLEMENTATION   Assessed learning needs and learning preference: N/A  Teaching Method(s)  used: Booklet / Handout  Explanation    Nutrition Education (Content):              a)  Discussed: went over pt's usual intake, overall dietary patterns. Discussed ozempic dose. Importance of protein throughout the day to maintain LBM. Pairing two food groups together to correct hypoglycemia. Etc.               b)  Provided the following handouts: Snacks, Healthy Snack List    This link: [email or web address]    Nutrition Education (Application):              a)  Discussed current eating plans and/or recommended alternative food choices              b)  Patient verbalizes understanding of diet by creating goals that reflect diet recommendations and offering no additional questions or concerns at the end of the meeting     Anticipate great compliance   Stage of Change: action mostly  Additional: doing well on ozempic, would like to increase dose, open to information    GOALS  N/A    FOLLOW UP/MONITORING   Progress towards goals will be monitored and evaluated per protocol and Practice Guidelines    Time Spent with Patient  Approx. 16 minutes    Hali Grullon RD, LD  Clinical Dietitian  Office: [phone]  Weekend pager: [phone]

## 2024-09-20 ENCOUNTER — MYC REFILL (OUTPATIENT)
Dept: FAMILY MEDICINE | Facility: CLINIC | Age: 39
End: 2024-09-20
Payer: COMMERCIAL

## 2024-09-20 DIAGNOSIS — I10 BENIGN ESSENTIAL HYPERTENSION: ICD-10-CM

## 2024-09-20 RX ORDER — AMLODIPINE BESYLATE 2.5 MG/1
5 TABLET ORAL DAILY
Qty: 60 TABLET | Refills: 0 | Status: CANCELLED | OUTPATIENT
Start: 2024-09-20

## 2024-10-17 DIAGNOSIS — I10 BENIGN ESSENTIAL HYPERTENSION: ICD-10-CM

## 2024-10-17 RX ORDER — AMLODIPINE BESYLATE 2.5 MG/1
5 TABLET ORAL DAILY
Qty: 60 TABLET | Refills: 0 | Status: SHIPPED | OUTPATIENT
Start: 2024-10-17

## 2024-10-18 ENCOUNTER — IMMUNIZATION (OUTPATIENT)
Dept: FAMILY MEDICINE | Facility: CLINIC | Age: 39
End: 2024-10-18
Payer: COMMERCIAL

## 2024-10-18 PROCEDURE — 90471 IMMUNIZATION ADMIN: CPT

## 2024-10-18 PROCEDURE — 90656 IIV3 VACC NO PRSV 0.5 ML IM: CPT

## 2024-10-30 ENCOUNTER — MYC REFILL (OUTPATIENT)
Dept: FAMILY MEDICINE | Facility: CLINIC | Age: 39
End: 2024-10-30
Payer: COMMERCIAL

## 2024-10-30 DIAGNOSIS — E66.813 CLASS 3 SEVERE OBESITY DUE TO EXCESS CALORIES WITH SERIOUS COMORBIDITY AND BODY MASS INDEX (BMI) OF 45.0 TO 49.9 IN ADULT (H): ICD-10-CM

## 2024-10-30 DIAGNOSIS — E66.01 CLASS 3 SEVERE OBESITY DUE TO EXCESS CALORIES WITH SERIOUS COMORBIDITY AND BODY MASS INDEX (BMI) OF 45.0 TO 49.9 IN ADULT (H): ICD-10-CM

## 2024-10-31 NOTE — TELEPHONE ENCOUNTER
Pending Prescriptions:                       Disp   Refills    semaglutide (OZEMPIC) 2 MG/3ML pen        3 mL   0            Sig: Inject 0.5 mg subcutaneously every 7 days.    Routing refill request to provider for review/approval because:   Medication indicated for associated diagnosis         Jeremiah Smith RN

## 2024-11-09 ASSESSMENT — ANXIETY QUESTIONNAIRES: GAD7 TOTAL SCORE: 17

## 2024-11-14 ENCOUNTER — MYC REFILL (OUTPATIENT)
Dept: FAMILY MEDICINE | Facility: CLINIC | Age: 39
End: 2024-11-14
Payer: COMMERCIAL

## 2024-11-14 DIAGNOSIS — E66.813 CLASS 3 SEVERE OBESITY DUE TO EXCESS CALORIES WITH SERIOUS COMORBIDITY AND BODY MASS INDEX (BMI) OF 45.0 TO 49.9 IN ADULT (H): ICD-10-CM

## 2024-11-14 DIAGNOSIS — I10 BENIGN ESSENTIAL HYPERTENSION: ICD-10-CM

## 2024-11-14 DIAGNOSIS — F41.9 ANXIETY: ICD-10-CM

## 2024-11-14 DIAGNOSIS — E66.01 CLASS 3 SEVERE OBESITY DUE TO EXCESS CALORIES WITH SERIOUS COMORBIDITY AND BODY MASS INDEX (BMI) OF 45.0 TO 49.9 IN ADULT (H): ICD-10-CM

## 2024-11-15 RX ORDER — ESCITALOPRAM OXALATE 20 MG/1
20 TABLET ORAL DAILY
Qty: 90 TABLET | Refills: 1 | OUTPATIENT
Start: 2024-11-15

## 2024-11-15 RX ORDER — AMLODIPINE BESYLATE 2.5 MG/1
5 TABLET ORAL DAILY
Qty: 180 TABLET | Refills: 1 | Status: SHIPPED | OUTPATIENT
Start: 2024-11-15

## 2024-12-05 ENCOUNTER — OFFICE VISIT (OUTPATIENT)
Dept: DERMATOLOGY | Facility: CLINIC | Age: 39
End: 2024-12-05
Payer: COMMERCIAL

## 2024-12-05 DIAGNOSIS — L81.4 LENTIGO: ICD-10-CM

## 2024-12-05 DIAGNOSIS — D23.9 DERMATOFIBROMA: ICD-10-CM

## 2024-12-05 DIAGNOSIS — J34.89 NASAL SORE: ICD-10-CM

## 2024-12-05 DIAGNOSIS — L82.1 SEBORRHEIC KERATOSIS: ICD-10-CM

## 2024-12-05 DIAGNOSIS — D22.9 MULTIPLE BENIGN NEVI: ICD-10-CM

## 2024-12-05 DIAGNOSIS — L91.8 INFLAMED SKIN TAG: ICD-10-CM

## 2024-12-05 DIAGNOSIS — D18.01 CHERRY ANGIOMA: ICD-10-CM

## 2024-12-05 ASSESSMENT — PAIN SCALES - GENERAL: PAINLEVEL_OUTOF10: NO PAIN (0)

## 2024-12-05 NOTE — PATIENT INSTRUCTIONS
WOUND CARE INSTRUCTIONS   FOR CRYOSURGERY   This area treated with liquid nitrogen should form a blister (areas treated may or may not blister-skin may just turn dark and slough off). You do not need to bandage the area unless a blister forms and breaks (which may be a few days). When the blister breaks, begin daily dressing changes as follows:  1) Clean and dry the area with tap water using clean Q-tip or sterile gauze pad.   2) Apply Polysporin ointment or Bacitracin ointment over entire wound. Do NOT use Neosporin ointment.   3) Cover the wound with a band-aid or sterile non-stick gauze pad and micropore paper tape.   REPEAT THESE INSTRUCTIONS AT LEAST ONCE A DAY UNTIL THE WOUND HAS COMPLETELY HEALED.   It is an old wives tale that a wound heals better when it is exposed to air and allowed to dry out. The wound will heal faster with a better cosmetic result if it is kept moist with ointment and covered with a bandage.   Do not let the wound dry out.   IMPORTANT INFORMATION ON REVERSE SIDE   Supplies Needed:   *Cotton tipped applicators (Q-tips)   *Polysporin ointment or Bacitracin ointment (NOT NEOSPORIN)   *Band-aids, or non stick gauze pads and micropore paper tape   PATIENT INFORMATION   During the healing process you will notice a number of changes. All wounds develop a small halo of redness surrounding the wound. This means healing is occurring. Severe itching with extensive redness usually indicates sensitivity to the ointment or bandage tape used to dress the wound. You should call our office if this develops.   Swelling and/or discoloration around your surgical site is common, particularly when performed around the eye.   All wounds normally drain. The larger the wound the more drainage there will be. After 7-10 days, you will notice the wound beginning to shrink and new skin will begin to grow. The wound is healed when you can see skin has formed over the entire area. A healed wound has a healthy, shiny  look to the surface and is red to dark pink in color to normalize. Wounds may take approximately 4-6 weeks to heal. Larger wounds may take 6-8 weeks. After the wound is healed you may discontinue dressing changes.   You may experience a sensation of tightness as your wound heals. This is normal and will gradually subside.   Your healed wound may be sensitive to temperature changes. This sensitivity improves with time, but if you re having a lot of discomfort, try to avoid temperature extremes.   Patients frequently experience itching after their wound appears to have healed because of the continue healing under the skin. Plain Vaseline will help relieve the itching.          Proper skin care from [facility]:    -Eliminate harsh soaps as they strip the natural oils from the skin, often resulting in dry itchy skin ( i.e. Dial, Zest, Tristanian Spring)  -Use mild soaps such as Cetaphil or Dove Sensitive Skin in the shower. You do not need to use soap on arms, legs, and trunk every time you shower unless visibly soiled.   -Avoid hot or cold showers.  -After showering, lightly dry off and apply moisturizing within 2-3 minutes. This will help trap moisture in the skin.   -Aggressive use of a moisturizer at least 1-2 times a day to the entire body (including -Vanicream, Cetaphil, Aquaphor or Cerave) and moisturize hands after every washing.  -We recommend using moisturizers that come in a tub that needs to be scooped out, not a pump. This has more of an oil base. It will hold moisture in your skin much better than a water base moisturizer. The above recommended are non-pore clogging.      Wear a sunscreen with at least SPF 30 on your face, ears, neck and V of the chest daily. Wear sunscreen on other areas of the body if those areas are exposed to the sun throughout the day. Sunscreens can contain physical and/or chemical blockers. Physical blockers are less likely to clog pores, these include zinc oxide and titanium  dioxide. Reapply every two hour and after swimming.     Sunscreen examples: https://www.ewg.org/sunscreen/    UV radiation  UVA radiation remains constant throughout the day and throughout the year. It is a longer wavelength than UVB and therefore penetrates deeper into the skin leading to immediate and delayed tanning, photoaging, and skin cancer. 70-80% of UVA and UVB radiation occurs between the hours of 10am-2pm.  UVB radiation  UVB radiation causes the most harmful effects and is more significant during the summer months. However, snow and ice can reflect UVB radiation leading to skin damage during the winter months as well. UVB radiation is responsible for tanning, burning, inflammation, delayed erythema (pinkness), pigmentation (brown spots), and skin cancer.     I recommend self monthly full body exams and yearly full body exams with a dermatology provider. If you develop a new or changing lesion please follow up for examination. Most skin cancers are pink and scaly or pink and pearly. However, we do see blue/brown/black skin cancers.  Consider the ABCDEs of melanoma when giving yourself your monthly full body exam ( don't forget the groin, buttocks, feet, toes, etc). A-asymmetry, B-borders, C-color, D-diameter, E-elevation or evolving. If you see any of these changes please follow up in clinic. If you cannot see your back I recommend purchasing a hand held mirror to use with a larger wall mirror.       Checking for Skin Cancer  You can find cancer early by checking your skin each month. There are 3 kinds of skin cancer. They are melanoma, basal cell carcinoma, and squamous cell carcinoma. Doing monthly skin checks is the best way to find new marks or skin changes. Follow the instructions below for checking your skin.   The ABCDEs of checking moles for melanoma   Check your moles or growths for signs of melanoma using ABCDE:   Asymmetry: the sides of the mole or growth don t match  Border: the edges are  ragged, notched, or blurred  Color: the color within the mole or growth varies  Diameter: the mole or growth is larger than 6 mm (size of a pencil eraser)  Evolving: the size, shape, or color of the mole or growth is changing (evolving is not shown in the images below)    Checking for other types of skin cancer  Basal cell carcinoma or squamous cell carcinoma have symptoms such as:     A spot or mole that looks different from all other marks on your skin  Changes in how an area feels, such as itching, tenderness, or pain  Changes in the skin's surface, such as oozing, bleeding, or scaliness  A sore that does not heal  New swelling or redness beyond the border of a mole    Who s at risk?  Anyone can get skin cancer. But you are at greater risk if you have:   Fair skin, light-colored hair, or light-colored eyes  Many moles or abnormal moles on your skin  A history of sunburns from sunlight or tanning beds  A family history of skin cancer  A history of exposure to radiation or chemicals  A weakened immune system  If you have had skin cancer in the past, you are at risk for recurring skin cancer.   How to check your skin  Do your monthly skin checkups in front of a full-length mirror. Check all parts of your body, including your:   Head (ears, face, neck, and scalp)  Torso (front, back, and sides)  Arms (tops, undersides, upper, and lower armpits)  Hands (palms, backs, and fingers, including under the nails)  Buttocks and genitals  Legs (front, back, and sides)  Feet (tops, soles, toes, including under the nails, and between toes)  If you have a lot of moles, take digital photos of them each month. Make sure to take photos both up close and from a distance. These can help you see if any moles change over time.   Most skin changes are not cancer. But if you see any changes in your skin, call your doctor right away. Only he or she can diagnose a problem. If you have skin cancer, seeing your doctor can be the first step  toward getting the treatment that could save your life.   HASH last reviewed this educational content on 4/1/2019 2000-2020 [employer]. [address] [place] [address], [place] [address]. All rights reserved. This information is not intended as a substitute for professional medical care. Always follow your healthcare professional's instructions.       When should I call my doctor?  If you are worsening or not improving, please, contact us or seek urgent care as noted below.     Who should I call with questions (adults)?    [facility] [phone]  For urgent needs outside of business hours call the [facility] at [phone] and ask for the dermatology resident on call to be paged  If this is a medical emergency and you are unable to reach an ER, Call [phone]      If you need a prescription refill, please contact your pharmacy. Refills are approved or denied by our Physicians during normal business hours, Monday through Fridays  Per office policy, refills will not be granted if you have not been seen within the past year (or sooner depending on your child's condition)

## 2024-12-05 NOTE — LETTER
12/5/2024      Mary Alice Jordan  [id number] [facility]   [facility] [address]      Dear Colleague,    Thank you for referring your patient, Mary Alice Jordan, to the [facility]. Please see a copy of my visit note below.    Mary Alice Jordan is a pleasant 39 year old year old female patient here today for skin check. She notes skin tags that are bothersome. No painful or bleeding skin lesions. No changing nevi. Skin tags on right and left leg and eyelid x 2. She notes skin tag on right lower eyelid bothesrome. She notes firm bump by left elbow, present for months.  Patient has no other skin complaints today.  Remainder of the HPI, Meds, PMH, Allergies, FH, and SH was reviewed in chart.    Pertinent Hx:   No personal history of skin cancer   Past Medical History:   Diagnosis Date     Anxiety      Chickenpox      Diabetes (H) 2015     Irritable bowel syndrome (IBS)        Past Surgical History:   Procedure Laterality Date     TONSILLECTOMY  6th grade        Family History   Problem Relation Age of Onset     Diabetes Mother      Hypertension Mother      Cerebrovascular Disease Mother      Heart Disease Father         MI     Diabetes Father      Cancer Maternal Grandfather        Social History     Socioeconomic History     Marital status:      Spouse name: Not on file     Number of children: Not on file     Years of education: Not on file     Highest education level: Not on file   Occupational History     Not on file   Tobacco Use     Smoking status: Former     Types: Cigarettes     Smokeless tobacco: Never   Vaping Use     Vaping status: Never Used   Substance and Sexual Activity     Alcohol use: Not Currently     Comment: quit with pregnancy     Drug use: No     Sexual activity: Yes     Partners: Male     Birth control/protection: None     Comment:  since 2014 (together since 2005)   Other Topics Concern     Parent/sibling w/ CABG, MI or angioplasty before 65F 55M? Yes   Social  History Narrative     Not on file     Social Drivers of Health     Financial Resource Strain: Low Risk  (5/29/2024)    Financial Resource Strain      Within the past 12 months, have you or your family members you live with been unable to get utilities (heat, electricity) when it was really needed?: No   Food Insecurity: Low Risk  (5/29/2024)    Food Insecurity      Within the past 12 months, did you worry that your food would run out before you got money to buy more?: No      Within the past 12 months, did the food you bought just not last and you didn t have money to get more?: No   Transportation Needs: Low Risk  (5/29/2024)    Transportation Needs      Within the past 12 months, has lack of transportation kept you from medical appointments, getting your medicines, non-medical meetings or appointments, work, or from getting things that you need?: No   Physical Activity: Sufficiently Active (5/29/2024)    Exercise Vital Sign      Days of Exercise per Week: 5 days      Minutes of Exercise per Session: 30 min   Stress: Stress Concern Present (5/29/2024)    [employer] - Occupational Stress Questionnaire      Feeling of Stress : Rather much   Social Connections: Unknown (5/29/2024)    Social Connection and Isolation Panel [NHANES]      Frequency of Communication with Friends and Family: Not on file      Frequency of Social Gatherings with Friends and Family: Once a week      Attends Hoahaoism Services: Not on file      Active Member of Clubs or Organizations: Not on file      Attends Club or Organization Meetings: Not on file      Marital Status: Not on file   Interpersonal Safety: Low Risk  (5/29/2024)    Interpersonal Safety      Do you feel physically and emotionally safe where you currently live?: Yes      Within the past 12 months, have you been hit, slapped, kicked or otherwise physically hurt by someone?: No      Within the past 12 months, have you been humiliated or emotionally  abused in other ways by your partner or ex-partner?: No   Housing Stability: Low Risk  (5/29/2024)    Housing Stability      Do you have housing? : Yes      Are you worried about losing your housing?: No       Outpatient Encounter Medications as of 12/5/2024   Medication Sig Dispense Refill     amLODIPine (NORVASC) 2.5 MG tablet Take 2 tablets (5 mg) by mouth daily. 180 tablet 1     ARIPiprazole (ABILIFY) 2 MG tablet Take 1 tablet (2 mg) by mouth daily 30 tablet 0     blood glucose (NO BRAND SPECIFIED) lancets standard Use to test blood sugar 4 times daily or as directed. 100 each 3     blood glucose (NO BRAND SPECIFIED) test strip Use to test blood sugar 4 times daily or as directed. 100 strip 3     blood glucose (ONETOUCH VERIO IQ) test strip USE 1 STRIP TO CHECK GLUCOSE 4 TIMES DAILY AS DIRECTED 400 strip 1     blood glucose monitoring (NO BRAND SPECIFIED) meter device kit Use to test blood sugar 3 times daily or as directed. 3 kit 3     Continuous Blood Gluc Sensor (FREESTYLE RADHA 2 SENSOR) MISC Inject 1 each Subcutaneous every 14 days Use 1 sensor every 14 days. Use to read blood sugars per 's instructions. 2 each 5     Continuous Blood Gluc Sensor (FREESTYLE RADHA 3 SENSOR) MISC 1 Device every 14 days Will use [employer]'s free trial voucher & bring voucher to pharmacy when picking up 1 each 0     Continuous Blood Gluc Sensor (FREESTYLE RADHA 3 SENSOR) MISC 1 Device every 14 days If not approved by insurance, she will use the [employer] Voucher for 2 for 75$ 2 each 11     escitalopram (LEXAPRO) 20 MG tablet Take 1 tablet by mouth once daily 90 tablet 1     glucose (BD GLUCOSE) 4 g chewable tablet Take 1 tablet by mouth every hour as needed for low blood sugar 30 tablet 1     hydrOXYzine (ATARAX) 10 MG tablet Take 1 tablet (10 mg) by mouth 3 times daily as needed for anxiety 90 tablet 1     semaglutide (OZEMPIC) 2 MG/3ML pen Inject 0.5 mg subcutaneously every 7 days. 3 mL 0     SUMAtriptan (IMITREX) 25 MG  tablet Take 1 tablet (25 mg) by mouth at onset of headache for migraine May repeat in 2 hours. Max 8 tablets/24 hours. 18 tablet 1     topiramate (TOPAMAX) 25 MG tablet Take 1 tablet (25 mg) by mouth 2 times daily 60 tablet 1     VITAMIN D PO        albuterol (PROAIR HFA/PROVENTIL HFA/VENTOLIN HFA) 108 (90 Base) MCG/ACT inhaler Inhale 2 puffs into the lungs every 4 hours as needed for shortness of breath (Patient not taking: Reported on 12/5/2024) 18 g 3     albuterol (PROAIR HFA/PROVENTIL HFA/VENTOLIN HFA) 108 (90 Base) MCG/ACT inhaler Inhale 2 puffs into the lungs every 4 hours as needed for shortness of breath (Patient not taking: Reported on 12/5/2024) 18 g 3     benzonatate (TESSALON) 100 MG capsule Take 1 capsule (100 mg) by mouth 3 times daily as needed for cough (Patient not taking: Reported on 12/5/2024) 30 capsule 0     No facility-administered encounter medications on file as of 12/5/2024.             O:   NAD, WDWN, Alert & Oriented, Mood & Affect wnl, Vitals stable   Here today alone   There were no vitals taken for this visit.   General appearance normal   Vitals stable   Alert, oriented and in no acute distress      No visible sore today in nostril    Skin colored pedunculated papule on right and left leg and right lower eyelid   Stuck on papules and brown macules on trunk and ext   Red papules on trunk  Brown papules and macules with regular pigment network and borders on torso and extremities   Firm papules consistent with DF  The remainder of skin exam is normal       Eyes: Conjunctivae/lids:Normal     ENT: Lips normal    MSK:Normal    Cardiovascular: peripheral edema none    Pulm: Breathing Normal    Neuro/Psych: Orientation:Alert and Orientedx3 ; Mood/Affect:normal     A/P:  1. Recurrent sore on nostril   She notes recurs on septum.   Not visible today. Recommend aquaphor. Will refer to ENT for evaluation.   2. Inflamed skin tag on right lower eyelid   LN2 with forceps:  Treated with LN2 for 5s  for 1-2 cycles. Warned risks of blistering, pain, pigment change, scarring, and incomplete resolution.  Advised patient to return if lesions do not completely resolve.  Wound care sheet given.  3. Inflamed skin tags on thighs x 2  After consent, tangential excision performed.  No complications and routine wound care.   3. Seborrheic keratosis, lentigo, angioma, benign nevi, dermatofibroma  It was a pleasure speaking to Mary Alice Jordan today.  BENIGN LESIONS DISCUSSED WITH PATIENT:  I discussed the specifics of tumor, prognosis, and genetics of benign lesions.  I explained that treatment of these lesions would be purely cosmetic and not medically neccessary.  I discussed with patient different removal options including excision, cautery and /or laser.      Nature and genetics of benign skin lesions dicussed with patient.  Signs and Symptoms of skin cancer discussed with patient.  ABCDEs of melanoma reviewed with patient.  Patient encouraged to perform monthly skin exams.  UV precautions reviewed with patient.  Risks of non-melanoma skin cancer discussed with patient   Return to clinic in one year or sooner if needed.       Again, thank you for allowing me to participate in the care of your patient.        Sincerely,        Shanta Mosher PA-C

## 2024-12-06 NOTE — PROGRESS NOTES
Mary Alice Jordan is a pleasant 39 year old year old female patient here today for skin check. She notes skin tags that are bothersome. No painful or bleeding skin lesions. No changing nevi. Skin tags on right and left leg and eyelid x 2. She notes skin tag on right lower eyelid bothesrome. She notes firm bump by left elbow, present for months.  Patient has no other skin complaints today.  Remainder of the HPI, Meds, PMH, Allergies, FH, and SH was reviewed in chart.    Pertinent Hx:   No personal history of skin cancer   Past Medical History:   Diagnosis Date    Anxiety     Chickenpox     Diabetes (H) 2015    Irritable bowel syndrome (IBS)        Past Surgical History:   Procedure Laterality Date    TONSILLECTOMY  6th grade        Family History   Problem Relation Age of Onset    Diabetes Mother     Hypertension Mother     Cerebrovascular Disease Mother     Heart Disease Father         MI    Diabetes Father     Cancer Maternal Grandfather        Social History     Socioeconomic History    Marital status:      Spouse name: Not on file    Number of children: Not on file    Years of education: Not on file    Highest education level: Not on file   Occupational History    Not on file   Tobacco Use    Smoking status: Former     Types: Cigarettes    Smokeless tobacco: Never   Vaping Use    Vaping status: Never Used   Substance and Sexual Activity    Alcohol use: Not Currently     Comment: quit with pregnancy    Drug use: No    Sexual activity: Yes     Partners: Male     Birth control/protection: None     Comment:  since 2014 (together since 2005)   Other Topics Concern    Parent/sibling w/ CABG, MI or angioplasty before 65F 55M? Yes   Social History Narrative    Not on file     Social Drivers of Health     Financial Resource Strain: Low Risk  (5/29/2024)    Financial Resource Strain     Within the past 12 months, have you or your family members you live with been unable to get utilities (heat, electricity)  when it was really needed?: No   Food Insecurity: Low Risk  (5/29/2024)    Food Insecurity     Within the past 12 months, did you worry that your food would run out before you got money to buy more?: No     Within the past 12 months, did the food you bought just not last and you didn t have money to get more?: No   Transportation Needs: Low Risk  (5/29/2024)    Transportation Needs     Within the past 12 months, has lack of transportation kept you from medical appointments, getting your medicines, non-medical meetings or appointments, work, or from getting things that you need?: No   Physical Activity: Sufficiently Active (5/29/2024)    Exercise Vital Sign     Days of Exercise per Week: 5 days     Minutes of Exercise per Session: 30 min   Stress: Stress Concern Present (5/29/2024)    [employer] - Occupational Stress Questionnaire     Feeling of Stress : Rather much   Social Connections: Unknown (5/29/2024)    Social Connection and Isolation Panel [NHANES]     Frequency of Communication with Friends and Family: Not on file     Frequency of Social Gatherings with Friends and Family: Once a week     Attends [employer] Services: Not on file     Active Member of Clubs or Organizations: Not on file     Attends Club or Organization Meetings: Not on file     Marital Status: Not on file   Interpersonal Safety: Low Risk  (5/29/2024)    Interpersonal Safety     Do you feel physically and emotionally safe where you currently live?: Yes     Within the past 12 months, have you been hit, slapped, kicked or otherwise physically hurt by someone?: No     Within the past 12 months, have you been humiliated or emotionally abused in other ways by your partner or ex-partner?: No   Housing Stability: Low Risk  (5/29/2024)    Housing Stability     Do you have housing? : Yes     Are you worried about losing your housing?: No       Outpatient Encounter Medications as of 12/5/2024   Medication Sig Dispense Refill     amLODIPine (NORVASC) 2.5 MG tablet Take 2 tablets (5 mg) by mouth daily. 180 tablet 1    ARIPiprazole (ABILIFY) 2 MG tablet Take 1 tablet (2 mg) by mouth daily 30 tablet 0    blood glucose (NO BRAND SPECIFIED) lancets standard Use to test blood sugar 4 times daily or as directed. 100 each 3    blood glucose (NO BRAND SPECIFIED) test strip Use to test blood sugar 4 times daily or as directed. 100 strip 3    blood glucose (ONETOUCH VERIO IQ) test strip USE 1 STRIP TO CHECK GLUCOSE 4 TIMES DAILY AS DIRECTED 400 strip 1    blood glucose monitoring (NO BRAND SPECIFIED) meter device kit Use to test blood sugar 3 times daily or as directed. 3 kit 3    Continuous Blood Gluc Sensor (FREESTYLE RADHA 2 SENSOR) MISC Inject 1 each Subcutaneous every 14 days Use 1 sensor every 14 days. Use to read blood sugars per 's instructions. 2 each 5    Continuous Blood Gluc Sensor (FREESTYLE RADHA 3 SENSOR) MISC 1 Device every 14 days Will use [employer]'s free trial voucher & bring voucher to pharmacy when picking up 1 each 0    Continuous Blood Gluc Sensor (FREESTYLE RADHA 3 SENSOR) MISC 1 Device every 14 days If not approved by insurance, she will use the [employer] Voucher for 2 for 75$ 2 each 11    escitalopram (LEXAPRO) 20 MG tablet Take 1 tablet by mouth once daily 90 tablet 1    glucose (BD GLUCOSE) 4 g chewable tablet Take 1 tablet by mouth every hour as needed for low blood sugar 30 tablet 1    hydrOXYzine (ATARAX) 10 MG tablet Take 1 tablet (10 mg) by mouth 3 times daily as needed for anxiety 90 tablet 1    semaglutide (OZEMPIC) 2 MG/3ML pen Inject 0.5 mg subcutaneously every 7 days. 3 mL 0    SUMAtriptan (IMITREX) 25 MG tablet Take 1 tablet (25 mg) by mouth at onset of headache for migraine May repeat in 2 hours. Max 8 tablets/24 hours. 18 tablet 1    topiramate (TOPAMAX) 25 MG tablet Take 1 tablet (25 mg) by mouth 2 times daily 60 tablet 1    VITAMIN D PO       albuterol (PROAIR HFA/PROVENTIL HFA/VENTOLIN HFA) 108 (90  Base) MCG/ACT inhaler Inhale 2 puffs into the lungs every 4 hours as needed for shortness of breath (Patient not taking: Reported on 12/5/2024) 18 g 3    albuterol (PROAIR HFA/PROVENTIL HFA/VENTOLIN HFA) 108 (90 Base) MCG/ACT inhaler Inhale 2 puffs into the lungs every 4 hours as needed for shortness of breath (Patient not taking: Reported on 12/5/2024) 18 g 3    benzonatate (TESSALON) 100 MG capsule Take 1 capsule (100 mg) by mouth 3 times daily as needed for cough (Patient not taking: Reported on 12/5/2024) 30 capsule 0     No facility-administered encounter medications on file as of 12/5/2024.             O:   NAD, WDWN, Alert & Oriented, Mood & Affect wnl, Vitals stable   Here today alone   There were no vitals taken for this visit.   General appearance normal   Vitals stable   Alert, oriented and in no acute distress      No visible sore today in nostril    Skin colored pedunculated papule on right and left leg and right lower eyelid   Stuck on papules and brown macules on trunk and ext   Red papules on trunk  Brown papules and macules with regular pigment network and borders on torso and extremities   Firm papules consistent with DF  The remainder of skin exam is normal       Eyes: Conjunctivae/lids:Normal     ENT: Lips normal    MSK:Normal    Cardiovascular: peripheral edema none    Pulm: Breathing Normal    Neuro/Psych: Orientation:Alert and Orientedx3 ; Mood/Affect:normal     A/P:  1. Recurrent sore on nostril   She notes recurs on septum.   Not visible today. Recommend aquaphor. Will refer to ENT for evaluation.   2. Inflamed skin tag on right lower eyelid   LN2 with forceps:  Treated with LN2 for 5s for 1-2 cycles. Warned risks of blistering, pain, pigment change, scarring, and incomplete resolution.  Advised patient to return if lesions do not completely resolve.  Wound care sheet given.  3. Inflamed skin tags on thighs x 2  After consent, tangential excision performed.  No complications and routine  wound care.   3. Seborrheic keratosis, lentigo, angioma, benign nevi, dermatofibroma  It was a pleasure speaking to Mary Alice Jordan today.  BENIGN LESIONS DISCUSSED WITH PATIENT:  I discussed the specifics of tumor, prognosis, and genetics of benign lesions.  I explained that treatment of these lesions would be purely cosmetic and not medically neccessary.  I discussed with patient different removal options including excision, cautery and /or laser.      Nature and genetics of benign skin lesions dicussed with patient.  Signs and Symptoms of skin cancer discussed with patient.  ABCDEs of melanoma reviewed with patient.  Patient encouraged to perform monthly skin exams.  UV precautions reviewed with patient.  Risks of non-melanoma skin cancer discussed with patient   Return to clinic in one year or sooner if needed.

## 2024-12-17 NOTE — TELEPHONE ENCOUNTER
Attempted to call nurse to nurse report to [facility]. Nurse will call back.    S-(situation): Reports nausea, no vomiting, not eating and drinking like she should due to feeling nauseous.     B-(background): Patient states that she noticed it last Monday, when it started in the morning and it seems to be off and on all day, no vomiting.     A-(assessment): Patient switched her Prenatal vitamin to bed time to see if that would help. Has been using gum, mints to try and help settle her stomach which has not been really doing much.     Denies: bleeding, cramping, fatigue, lightheadedness, or decreased urinary output.     R-(recommendations): Encouraged patient to push fluids (add some Gatorade to keep electrolytes up), trying to eat a snack: crackers, a granola bar before getting out of bed in the morning or at night before laying down. Unisom/B6 TID as needed, Sea-band, ginger hard candies, or chewy's. Patient encouraged to call back if nausea gets worse, or if she starts vomiting and unable to keep food/liquids down, cramping, bleeding, or decreased urine output occurs.     Information sent to patient through [employer] also.     Alvina Martinez RN on 2/22/2021 at 10:13 AM

## 2024-12-29 ENCOUNTER — HEALTH MAINTENANCE LETTER (OUTPATIENT)
Age: 39
End: 2024-12-29

## 2025-01-04 DIAGNOSIS — E66.813 CLASS 3 SEVERE OBESITY DUE TO EXCESS CALORIES WITH SERIOUS COMORBIDITY AND BODY MASS INDEX (BMI) OF 45.0 TO 49.9 IN ADULT (H): ICD-10-CM

## 2025-01-04 DIAGNOSIS — E66.01 CLASS 3 SEVERE OBESITY DUE TO EXCESS CALORIES WITH SERIOUS COMORBIDITY AND BODY MASS INDEX (BMI) OF 45.0 TO 49.9 IN ADULT (H): ICD-10-CM

## 2025-01-06 RX ORDER — SEMAGLUTIDE 0.68 MG/ML
INJECTION, SOLUTION SUBCUTANEOUS
Qty: 3 ML | Refills: 0 | Status: SHIPPED | OUTPATIENT
Start: 2025-01-06

## 2025-01-30 NOTE — PROGRESS NOTES
Chief Complaint   Patient presents with    Ent Problem     J34.89 (ICD-10-CM) - Nasal sore, Referral from Shanta Cantor, Referral notes in [facility]. Pt made appt for [place]     History of Present Illness   Mary Alice Jordan is a 39 year old female who presents for evaluation. Referred by Anna Cantor PA-C dermatology for concerns of a nasal sore. The patient was last seen on 12/5/24. There was report of a recurrent nasal sore located on the septum. However, at the visit it was not present. Recommendation was to use Aquaphor and ENT referral.     The patient was seen by a dermatologist. She was told that there were nodules in her nose. The patient noticed them a year and half ago. At times they do get tender but not enough to take medication. Sometimes she will pick them.     The patient notes no history of environmental allergies. They have not been tested for allergies in the past. No history of nasal trauma. The patient reports nasal obstruction left greater than right, no nasal congestion, no rhinorrhea, no post nasal drainage, no taste/smell disturbance, no face pain/pressure/fullness. No history of epistaxis. No prior history of nose or sinus surgery. Former history of smoking. She does have a history of migraine headache. Her last migraine was back in 6-7 months. No history of asthma. No history of aspirin/NSAID sensitivity.    No previous nose or sinus imaging.       Past Medical History  Patient Active Problem List   Diagnosis    Morbidly obese (H)    Female infertility, secondary    Elevated liver enzymes    Type 2 diabetes mellitus without complications (H)    Irregular menses    Anxiety    Type 2 diabetes mellitus without complication, unspecified whether long term insulin use (H)     Current Medications     Current Outpatient Medications:     albuterol (PROAIR HFA/PROVENTIL HFA/VENTOLIN HFA) 108 (90 Base) MCG/ACT inhaler, Inhale 2 puffs into the lungs every 4 hours as needed for shortness of breath  (Patient not taking: Reported on 12/5/2024), Disp: 18 g, Rfl: 3    albuterol (PROAIR HFA/PROVENTIL HFA/VENTOLIN HFA) 108 (90 Base) MCG/ACT inhaler, Inhale 2 puffs into the lungs every 4 hours as needed for shortness of breath (Patient not taking: Reported on 12/5/2024), Disp: 18 g, Rfl: 3    amLODIPine (NORVASC) 2.5 MG tablet, Take 2 tablets (5 mg) by mouth daily., Disp: 180 tablet, Rfl: 1    ARIPiprazole (ABILIFY) 2 MG tablet, Take 1 tablet (2 mg) by mouth daily, Disp: 30 tablet, Rfl: 0    benzonatate (TESSALON) 100 MG capsule, Take 1 capsule (100 mg) by mouth 3 times daily as needed for cough (Patient not taking: Reported on 12/5/2024), Disp: 30 capsule, Rfl: 0    blood glucose (NO BRAND SPECIFIED) lancets standard, Use to test blood sugar 4 times daily or as directed., Disp: 100 each, Rfl: 3    blood glucose (NO BRAND SPECIFIED) test strip, Use to test blood sugar 4 times daily or as directed., Disp: 100 strip, Rfl: 3    blood glucose (ONETOUCH VERIO IQ) test strip, USE 1 STRIP TO CHECK GLUCOSE 4 TIMES DAILY AS DIRECTED, Disp: 400 strip, Rfl: 1    blood glucose monitoring (NO BRAND SPECIFIED) meter device kit, Use to test blood sugar 3 times daily or as directed., Disp: 3 kit, Rfl: 3    Continuous Blood Gluc Sensor (FREESTYLE RADHA 2 SENSOR) [facility] [place], Inject 1 each Subcutaneous every 14 days Use 1 sensor every 14 days. Use to read blood sugars per 's instructions., Disp: 2 each, Rfl: 5    Continuous Blood Gluc Sensor (FREESTYLE RADHA 3 SENSOR) [facility] [place], 1 Device every 14 days Will use [employer]'s free trial voucher & bring voucher to pharmacy when picking up, Disp: 1 each, Rfl: 0    Continuous Blood Gluc Sensor (FREESTYLE RADHA 3 SENSOR) [facility] [place], 1 Device every 14 days If not approved by insurance, she will use the [employer] Voucher for 2 for 75$, Disp: 2 each, Rfl: 11    escitalopram (LEXAPRO) 20 MG tablet, Take 1 tablet by mouth once daily, Disp: 90 tablet, Rfl: 1    glucose (BD GLUCOSE) 4 g chewable tablet, Take 1  tablet by mouth every hour as needed for low blood sugar, Disp: 30 tablet, Rfl: 1    hydrOXYzine (ATARAX) 10 MG tablet, Take 1 tablet (10 mg) by mouth 3 times daily as needed for anxiety, Disp: 90 tablet, Rfl: 1    OZEMPIC, 0.25 OR 0.5 MG/DOSE, 2 MG/3ML pen, INJECT 0.5 MG SUBCUTANEOUSLY ONCE EVERY 7 DAYS, Disp: 3 mL, Rfl: 0    SUMAtriptan (IMITREX) 25 MG tablet, Take 1 tablet (25 mg) by mouth at onset of headache for migraine May repeat in 2 hours. Max 8 tablets/24 hours., Disp: 18 tablet, Rfl: 1    topiramate (TOPAMAX) 25 MG tablet, Take 1 tablet (25 mg) by mouth 2 times daily, Disp: 60 tablet, Rfl: 1    VITAMIN D PO, , Disp: , Rfl:     Allergies  No Known Allergies    Social History   Social History     Socioeconomic History    Marital status:    Tobacco Use    Smoking status: Former     Types: Cigarettes    Smokeless tobacco: Never   Vaping Use    Vaping status: Never Used   Substance and Sexual Activity    Alcohol use: Not Currently     Comment: quit with pregnancy    Drug use: No    Sexual activity: Yes     Partners: Male     Birth control/protection: None     Comment:  since 2014 (together since 2005)   Other Topics Concern    Parent/sibling w/ CABG, MI or angioplasty before 65F 55M? Yes     Social Drivers of Health     Financial Resource Strain: Low Risk  (5/29/2024)    Financial Resource Strain     Within the past 12 months, have you or your family members you live with been unable to get utilities (heat, electricity) when it was really needed?: No   Food Insecurity: Low Risk  (5/29/2024)    Food Insecurity     Within the past 12 months, did you worry that your food would run out before you got money to buy more?: No     Within the past 12 months, did the food you bought just not last and you didn t have money to get more?: No   Transportation Needs: Low Risk  (5/29/2024)    Transportation Needs     Within the past 12 months, has lack of transportation kept you from medical appointments,  "getting your medicines, non-medical meetings or appointments, work, or from getting things that you need?: No   Physical Activity: Sufficiently Active (5/29/2024)    Exercise Vital Sign     Days of Exercise per Week: 5 days     Minutes of Exercise per Session: 30 min   Stress: Stress Concern Present (5/29/2024)    [employer] [place] [employer] - Occupational Stress Questionnaire     Feeling of Stress : Rather much   Social Connections: Unknown (5/29/2024)    Social Connection and Isolation Panel [NHANES]     Frequency of Social Gatherings with Friends and Family: Once a week   Interpersonal Safety: Low Risk  (5/29/2024)    Interpersonal Safety     Do you feel physically and emotionally safe where you currently live?: Yes     Within the past 12 months, have you been hit, slapped, kicked or otherwise physically hurt by someone?: No     Within the past 12 months, have you been humiliated or emotionally abused in other ways by your partner or ex-partner?: No   Housing Stability: Low Risk  (5/29/2024)    Housing Stability     Do you have housing? : Yes     Are you worried about losing your housing?: No       Family History  Family History   Problem Relation Age of Onset    Diabetes Mother     Hypertension Mother     Cerebrovascular Disease Mother     Heart Disease Father         MI    Diabetes Father     Cancer Maternal Grandfather        Review of Systems  As per HPI and PMHx, otherwise 10+ comprehensive system review is negative.    Physical Exam  /82   Pulse 82   Ht 1.626 m (5' 4\")   Wt 130.6 kg (288 lb)   SpO2 96%   BMI 49.44 kg/m    GENERAL: The patient is a pleasant, cooperative 39 year old female in no acute distress.  HEAD: Normocephalic, atraumatic. Hair and scalp are normal.  EYES: Pupils are equal, round, reactive to light and accommodation. Extraocular movements are intact. The sclera nonicteric without injection. The extraocular structures are normal.  EARS: Normal shape and symmetry. " No tenderness when palpating the mastoid or tragal areas bilaterally. No mastoid erythema or fluctuance. Otoscopic exam on the right reveals no amount of cerumen. The right tympanic membrane is round, intact without evidence of effusion, good landmarks.  No retraction, granulation, or drainage. Otoscopic exam on the left reveals no amount of cerumen. The left tympanic membrane is round, intact without evidence of effusion, good landmarks.  No retraction, granulation, or drainage.  NOSE: Nares are patent.  Nasal mucosa is pink. Trauma to nasal vestibule and appears to be thicken. Hypertrophy turbinates. Deviated septum.  ORAL CAVITY: Lips are normal. Dentition is in good repair. Mucous membranes are moist. Tongue is mobile, protrudes to the midline.  Palate elevates symmetrically. Tonsils are +1. No erythema or exudate. No oral cavity or oropharyngeal masses, lesions, ulcerations, or leukoplakia.  NECK: Supple, trachea is midline. There is no palpable cervical lymphadenopathy or masses bilaterally. Palpation of the bilateral parotid and submandibular areas reveal no masses. No thyromegaly.    NEUROLOGIC: Cranial nerves II through XII are grossly intact. Voice is strong. Patient is House-Brackmann I/VI bilaterally.  CARDIOVASCULAR: Extremities are warm and well-perfused. No significant peripheral edema.  RESPIRATORY: Patient has nonlabored breathing without cough, wheeze, stridor.  PSYCHIATRIC: Patient is alert and oriented. Mood and affect appear normal.  SKIN: Warm and dry. No scalp, face, or neck lesions noted.    Assessment and Plan     ICD-10-CM    1. Nasal sore  J34.89 Adult ENT  Referral      2. Hypertrophy, nasal, turbinate  J34.3       3. Deviated nasal septum  J34.2         It was my pleasure seeing Mary Alice Jordan today in clinic. Based on the patient's history and physical examination the patient has trauma to the nasal vestibule. Therefore, encouraged her to start using Aquaphor to rehydrate  the area.     We also discussed snoring slightly. She does have hypertrophy of the turbinates and a possible deviated septum. Therefore, we start her one a nasal saline rinse and Flonase for the next two months. If symptoms persist, then we will get a CT scan and/or see a ENT provider.     JAMESON Polanco [facility]  Otolaryngology  [facility]

## 2025-02-05 ENCOUNTER — OFFICE VISIT (OUTPATIENT)
Dept: OTOLARYNGOLOGY | Facility: CLINIC | Age: 40
End: 2025-02-05
Payer: COMMERCIAL

## 2025-02-05 VITALS
DIASTOLIC BLOOD PRESSURE: 82 MMHG | HEART RATE: 82 BPM | SYSTOLIC BLOOD PRESSURE: 129 MMHG | BODY MASS INDEX: 49.17 KG/M2 | WEIGHT: 288 LBS | HEIGHT: 64 IN | OXYGEN SATURATION: 96 %

## 2025-02-05 DIAGNOSIS — J34.3 HYPERTROPHY, NASAL, TURBINATE: ICD-10-CM

## 2025-02-05 DIAGNOSIS — J34.2 DEVIATED NASAL SEPTUM: ICD-10-CM

## 2025-02-05 DIAGNOSIS — J34.89 NASAL SORE: Primary | ICD-10-CM

## 2025-02-05 NOTE — NURSING NOTE
Chief Complaint   Patient presents with    Ent Problem     J34.89 (ICD-10-CM) - Nasal sore, Referral from Shanta Mosher, Referral notes in [facility]. Pt made appt for [place]       There were no vitals filed for this visit.  Wt Readings from Last 1 Encounters:   07/04/24 127 kg (280 lb)   Berenice Clements MA

## 2025-02-05 NOTE — LETTER
2/5/2025      Mary Alice Jordan  [address]   [place] [address]      Dear Colleague,    Thank you for referring your patient, Mary Alice Jordan, to the [facility]. Please see a copy of my visit note below.    Chief Complaint   Patient presents with     Ent Problem     J34.89 (ICD-10-CM) - Nasal sore, Referral from Shanta Cantor, Referral notes in [facility]. Pt made appt for [place]     History of Present Illness   Mary Alice Jordan is a 39 year old female who presents for evaluation. Referred by Anna Cantor PA-C dermatology for concerns of a nasal sore. The patient was last seen on 12/5/24. There was report of a recurrent nasal sore located on the septum. However, at the visit it was not present. Recommendation was to use Aquaphor and ENT referral.     The patient was seen by a dermatologist. She was told that there were nodules in her nose. The patient noticed them a year and half ago. At times they do get tender but not enough to take medication. Sometimes she will pick them.     The patient notes no history of environmental allergies. They have not been tested for allergies in the past. No history of nasal trauma. The patient reports nasal obstruction left greater than right, no nasal congestion, no rhinorrhea, no post nasal drainage, no taste/smell disturbance, no face pain/pressure/fullness. No history of epistaxis. No prior history of nose or sinus surgery. Former history of smoking. She does have a history of migraine headache. Her last migraine was back in 6-7 months. No history of asthma. No history of aspirin/NSAID sensitivity.    No previous nose or sinus imaging.       Past Medical History  Patient Active Problem List   Diagnosis     Morbidly obese (H)     Female infertility, secondary     Elevated liver enzymes     Type 2 diabetes mellitus without complications (H)     Irregular menses     Anxiety     Type 2 diabetes mellitus without complication, unspecified  whether long term insulin use (H)     Current Medications     Current Outpatient Medications:      albuterol (PROAIR HFA/PROVENTIL HFA/VENTOLIN HFA) 108 (90 Base) MCG/ACT inhaler, Inhale 2 puffs into the lungs every 4 hours as needed for shortness of breath (Patient not taking: Reported on 12/5/2024), Disp: 18 g, Rfl: 3     albuterol (PROAIR HFA/PROVENTIL HFA/VENTOLIN HFA) 108 (90 Base) MCG/ACT inhaler, Inhale 2 puffs into the lungs every 4 hours as needed for shortness of breath (Patient not taking: Reported on 12/5/2024), Disp: 18 g, Rfl: 3     amLODIPine (NORVASC) 2.5 MG tablet, Take 2 tablets (5 mg) by mouth daily., Disp: 180 tablet, Rfl: 1     ARIPiprazole (ABILIFY) 2 MG tablet, Take 1 tablet (2 mg) by mouth daily, Disp: 30 tablet, Rfl: 0     benzonatate (TESSALON) 100 MG capsule, Take 1 capsule (100 mg) by mouth 3 times daily as needed for cough (Patient not taking: Reported on 12/5/2024), Disp: 30 capsule, Rfl: 0     blood glucose (NO BRAND SPECIFIED) lancets standard, Use to test blood sugar 4 times daily or as directed., Disp: 100 each, Rfl: 3     blood glucose (NO BRAND SPECIFIED) test strip, Use to test blood sugar 4 times daily or as directed., Disp: 100 strip, Rfl: 3     blood glucose (ONETOUCH VERIO IQ) test strip, USE 1 STRIP TO CHECK GLUCOSE 4 TIMES DAILY AS DIRECTED, Disp: 400 strip, Rfl: 1     blood glucose monitoring (NO BRAND SPECIFIED) meter device kit, Use to test blood sugar 3 times daily or as directed., Disp: 3 kit, Rfl: 3     Continuous Blood Gluc Sensor (FREESTYLE RADHA 2 SENSOR) [facility] [place], Inject 1 each Subcutaneous every 14 days Use 1 sensor every 14 days. Use to read blood sugars per 's instructions., Disp: 2 each, Rfl: 5     Continuous Blood Gluc Sensor (FREESTYLE RADHA 3 SENSOR) [facility] [place], 1 Device every 14 days Will use [employer]'s free trial voucher & bring voucher to pharmacy when picking up, Disp: 1 each, Rfl: 0     Continuous Blood Gluc Sensor (FREESTYLE RADHA 3 SENSOR) [facility], 1  Device every 14 days If not approved by insurance, she will use the [employer] Voucher for 2 for 75$, Disp: 2 each, Rfl: 11     escitalopram (LEXAPRO) 20 MG tablet, Take 1 tablet by mouth once daily, Disp: 90 tablet, Rfl: 1     glucose (BD GLUCOSE) 4 g chewable tablet, Take 1 tablet by mouth every hour as needed for low blood sugar, Disp: 30 tablet, Rfl: 1     hydrOXYzine (ATARAX) 10 MG tablet, Take 1 tablet (10 mg) by mouth 3 times daily as needed for anxiety, Disp: 90 tablet, Rfl: 1     OZEMPIC, 0.25 OR 0.5 MG/DOSE, 2 MG/3ML pen, INJECT 0.5 MG SUBCUTANEOUSLY ONCE EVERY 7 DAYS, Disp: 3 mL, Rfl: 0     SUMAtriptan (IMITREX) 25 MG tablet, Take 1 tablet (25 mg) by mouth at onset of headache for migraine May repeat in 2 hours. Max 8 tablets/24 hours., Disp: 18 tablet, Rfl: 1     topiramate (TOPAMAX) 25 MG tablet, Take 1 tablet (25 mg) by mouth 2 times daily, Disp: 60 tablet, Rfl: 1     VITAMIN D PO, , Disp: , Rfl:     Allergies  No Known Allergies    Social History   Social History     Socioeconomic History     Marital status:    Tobacco Use     Smoking status: Former     Types: Cigarettes     Smokeless tobacco: Never   Vaping Use     Vaping status: Never Used   Substance and Sexual Activity     Alcohol use: Not Currently     Comment: quit with pregnancy     Drug use: No     Sexual activity: Yes     Partners: Male     Birth control/protection: None     Comment:  since 2014 (together since 2005)   Other Topics Concern     Parent/sibling w/ CABG, MI or angioplasty before 65F 55M? Yes     Social Drivers of Health     Financial Resource Strain: Low Risk  (5/29/2024)    Financial Resource Strain      Within the past 12 months, have you or your family members you live with been unable to get utilities (heat, electricity) when it was really needed?: No   Food Insecurity: Low Risk  (5/29/2024)    Food Insecurity      Within the past 12 months, did you worry that your food would run out before you got money to buy  "more?: No      Within the past 12 months, did the food you bought just not last and you didn t have money to get more?: No   Transportation Needs: Low Risk  (5/29/2024)    Transportation Needs      Within the past 12 months, has lack of transportation kept you from medical appointments, getting your medicines, non-medical meetings or appointments, work, or from getting things that you need?: No   Physical Activity: Sufficiently Active (5/29/2024)    Exercise Vital Sign      Days of Exercise per Week: 5 days      Minutes of Exercise per Session: 30 min   Stress: Stress Concern Present (5/29/2024)    [employer] [place] [employer] - Occupational Stress Questionnaire      Feeling of Stress : Rather much   Social Connections: Unknown (5/29/2024)    Social Connection and Isolation Panel [NHANES]      Frequency of Social Gatherings with Friends and Family: Once a week   Interpersonal Safety: Low Risk  (5/29/2024)    Interpersonal Safety      Do you feel physically and emotionally safe where you currently live?: Yes      Within the past 12 months, have you been hit, slapped, kicked or otherwise physically hurt by someone?: No      Within the past 12 months, have you been humiliated or emotionally abused in other ways by your partner or ex-partner?: No   Housing Stability: Low Risk  (5/29/2024)    Housing Stability      Do you have housing? : Yes      Are you worried about losing your housing?: No       Family History  Family History   Problem Relation Age of Onset     Diabetes Mother      Hypertension Mother      Cerebrovascular Disease Mother      Heart Disease Father         MI     Diabetes Father      Cancer Maternal Grandfather        Review of Systems  As per HPI and PMHx, otherwise 10+ comprehensive system review is negative.    Physical Exam  /82   Pulse 82   Ht 1.626 m (5' 4\")   Wt 130.6 kg (288 lb)   SpO2 96%   BMI 49.44 kg/m    GENERAL: The patient is a pleasant, cooperative 39 year old " female in no acute distress.  HEAD: Normocephalic, atraumatic. Hair and scalp are normal.  EYES: Pupils are equal, round, reactive to light and accommodation. Extraocular movements are intact. The sclera nonicteric without injection. The extraocular structures are normal.  EARS: Normal shape and symmetry. No tenderness when palpating the mastoid or tragal areas bilaterally. No mastoid erythema or fluctuance. Otoscopic exam on the right reveals no amount of cerumen. The right tympanic membrane is round, intact without evidence of effusion, good landmarks.  No retraction, granulation, or drainage. Otoscopic exam on the left reveals no amount of cerumen. The left tympanic membrane is round, intact without evidence of effusion, good landmarks.  No retraction, granulation, or drainage.  NOSE: Nares are patent.  Nasal mucosa is pink. Trauma to nasal vestibule and appears to be thicken. Hypertrophy turbinates. Deviated septum.  ORAL CAVITY: Lips are normal. Dentition is in good repair. Mucous membranes are moist. Tongue is mobile, protrudes to the midline.  Palate elevates symmetrically. Tonsils are +1. No erythema or exudate. No oral cavity or oropharyngeal masses, lesions, ulcerations, or leukoplakia.  NECK: Supple, trachea is midline. There is no palpable cervical lymphadenopathy or masses bilaterally. Palpation of the bilateral parotid and submandibular areas reveal no masses. No thyromegaly.    NEUROLOGIC: Cranial nerves II through XII are grossly intact. Voice is strong. Patient is House-Brackmann I/VI bilaterally.  CARDIOVASCULAR: Extremities are warm and well-perfused. No significant peripheral edema.  RESPIRATORY: Patient has nonlabored breathing without cough, wheeze, stridor.  PSYCHIATRIC: Patient is alert and oriented. Mood and affect appear normal.  SKIN: Warm and dry. No scalp, face, or neck lesions noted.    Assessment and Plan     ICD-10-CM    1. Nasal sore  J34.89 Adult ENT  Referral      2.  Hypertrophy, nasal, turbinate  J34.3       3. Deviated nasal septum  J34.2         It was my pleasure seeing Mary Alice Jordan today in clinic. Based on the patient's history and physical examination the patient has trauma to the nasal vestibule. Therefore, encouraged her to start using Aquaphor to rehydrate the area.     We also discussed snoring slightly. She does have hypertrophy of the turbinates and a possible deviated septum. Therefore, we start her one a nasal saline rinse and Flonase for the next two months. If symptoms persist, then we will get a CT scan and/or see a ENT provider.     JAMESON Polanco CNP  Otolaryngology  [place] [facility] [place]      Again, thank you for allowing me to participate in the care of your patient.        Sincerely,        JAMESON Polanco CNP    Electronically signed

## 2025-02-05 NOTE — PATIENT INSTRUCTIONS
You were seen by Mario Gauthier CNP.  If you have questions or concerns regarding your appointment today, you can reach out to our call center at [phone].  The following has been recommended at your appointment today:    Start using the saline nasal spray rinse bottle 1-2 per day.   Then use Flonase twice daily 25-30 minutes after the saline nasal rinse.    Use Aquaphor or Vaseline 2-3 times daily around your nostrils to prevent dryness.  I will send you a [employer] message in 2 months to see how you are doing. We may have you get a CT scan of the sinus or/and see an ENT surgeon.     You may have a deviated septum and enlargement of the turbinates (help humidify the air you breath in and out).     NASAL SALINE IRRIGATION INSTRUCTIONS    You will be starting nasal saline irrigations and will need to obtain the following:      - NeilMed Sinus Rinse 8 oz Kit  - Distilled or filtered water   - Normal saline salt packets    Place filtered or distilled water into the NeilMed bottle up to the fill line (DO NOT USE TAP OR WELL WATER).  Place the pre-made salt packet in the 8 oz of saline.  Shake the bottle to suspend into solution.  Lean head forward over a sink or a basin.  Rinse each side of the nose with one-half of the bottle (each squeeze is about one-half of the bottle). Rinse the nose daily.     If you use topical nasal sprays, apply following irrigation.    Video example: https://[email or web address].com/watch?v=[email or web address]

## 2025-02-22 DIAGNOSIS — F41.9 ANXIETY: ICD-10-CM

## 2025-02-24 RX ORDER — ESCITALOPRAM OXALATE 20 MG/1
20 TABLET ORAL DAILY
Qty: 90 TABLET | Refills: 0 | Status: SHIPPED | OUTPATIENT
Start: 2025-02-24

## 2025-03-01 DIAGNOSIS — E11.9 TYPE 2 DIABETES MELLITUS WITHOUT COMPLICATION, WITHOUT LONG-TERM CURRENT USE OF INSULIN (H): ICD-10-CM

## 2025-03-03 RX ORDER — ACYCLOVIR 800 MG/1
TABLET ORAL
Qty: 3 EACH | Refills: 3 | Status: SHIPPED | OUTPATIENT
Start: 2025-03-03

## 2025-04-19 ENCOUNTER — HEALTH MAINTENANCE LETTER (OUTPATIENT)
Age: 40
End: 2025-04-19

## 2025-05-29 ENCOUNTER — OFFICE VISIT (OUTPATIENT)
Dept: FAMILY MEDICINE | Facility: CLINIC | Age: 40
End: 2025-05-29

## 2025-05-29 VITALS
BODY MASS INDEX: 49.68 KG/M2 | TEMPERATURE: 98.2 F | RESPIRATION RATE: 24 BRPM | DIASTOLIC BLOOD PRESSURE: 92 MMHG | OXYGEN SATURATION: 97 % | HEIGHT: 64 IN | WEIGHT: 291 LBS | HEART RATE: 87 BPM | SYSTOLIC BLOOD PRESSURE: 144 MMHG

## 2025-05-29 DIAGNOSIS — F43.23 ADJUSTMENT REACTION WITH ANXIETY AND DEPRESSION: ICD-10-CM

## 2025-05-29 DIAGNOSIS — E11.9 TYPE 2 DIABETES MELLITUS WITHOUT COMPLICATION, WITHOUT LONG-TERM CURRENT USE OF INSULIN (H): Primary | ICD-10-CM

## 2025-05-29 DIAGNOSIS — R39.9 URINARY TRACT INFECTION SYMPTOMS: ICD-10-CM

## 2025-05-29 DIAGNOSIS — Z13.220 LIPID SCREENING: ICD-10-CM

## 2025-05-29 LAB
ALBUMIN UR-MCNC: NEGATIVE MG/DL
ANION GAP SERPL CALCULATED.3IONS-SCNC: 12 MMOL/L (ref 7–15)
APPEARANCE UR: CLEAR
BACTERIA #/AREA URNS HPF: ABNORMAL /HPF
BILIRUB UR QL STRIP: NEGATIVE
BUN SERPL-MCNC: 14.3 MG/DL (ref 6–20)
CALCIUM SERPL-MCNC: 9.3 MG/DL (ref 8.8–10.4)
CHLORIDE SERPL-SCNC: 100 MMOL/L (ref 98–107)
CHOLEST SERPL-MCNC: 157 MG/DL
COLOR UR AUTO: YELLOW
CREAT SERPL-MCNC: 0.52 MG/DL (ref 0.51–0.95)
CREAT UR-MCNC: 23.8 MG/DL
EGFRCR SERPLBLD CKD-EPI 2021: >90 ML/MIN/1.73M2
EST. AVERAGE GLUCOSE BLD GHB EST-MCNC: 189 MG/DL
FASTING STATUS PATIENT QL REPORTED: NO
FASTING STATUS PATIENT QL REPORTED: NO
GLUCOSE SERPL-MCNC: 254 MG/DL (ref 70–99)
GLUCOSE UR STRIP-MCNC: 250 MG/DL
HBA1C MFR BLD: 8.2 % (ref 0–5.6)
HCO3 SERPL-SCNC: 27 MMOL/L (ref 22–29)
HDLC SERPL-MCNC: 53 MG/DL
HGB UR QL STRIP: NEGATIVE
KETONES UR STRIP-MCNC: NEGATIVE MG/DL
LDLC SERPL CALC-MCNC: 76 MG/DL
LEUKOCYTE ESTERASE UR QL STRIP: ABNORMAL
MICROALBUMIN UR-MCNC: 26.3 MG/L
MICROALBUMIN/CREAT UR: 110.5 MG/G CR (ref 0–25)
NITRATE UR QL: NEGATIVE
NONHDLC SERPL-MCNC: 104 MG/DL
PH UR STRIP: 6 [PH] (ref 5–7)
POTASSIUM SERPL-SCNC: 4.6 MMOL/L (ref 3.4–5.3)
RBC #/AREA URNS AUTO: ABNORMAL /HPF
SODIUM SERPL-SCNC: 139 MMOL/L (ref 135–145)
SP GR UR STRIP: 1.01 (ref 1–1.03)
SQUAMOUS #/AREA URNS AUTO: ABNORMAL /LPF
TRIGL SERPL-MCNC: 139 MG/DL
UROBILINOGEN UR STRIP-ACNC: 0.2 E.U./DL
WBC #/AREA URNS AUTO: ABNORMAL /HPF

## 2025-05-29 RX ORDER — SULFAMETHOXAZOLE AND TRIMETHOPRIM 800; 160 MG/1; MG/1
1 TABLET ORAL 2 TIMES DAILY
Qty: 6 TABLET | Refills: 0 | Status: SHIPPED | OUTPATIENT
Start: 2025-05-29 | End: 2025-06-01

## 2025-05-29 ASSESSMENT — ANXIETY QUESTIONNAIRES
6. BECOMING EASILY ANNOYED OR IRRITABLE: MORE THAN HALF THE DAYS
7. FEELING AFRAID AS IF SOMETHING AWFUL MIGHT HAPPEN: MORE THAN HALF THE DAYS
IF YOU CHECKED OFF ANY PROBLEMS ON THIS QUESTIONNAIRE, HOW DIFFICULT HAVE THESE PROBLEMS MADE IT FOR YOU TO DO YOUR WORK, TAKE CARE OF THINGS AT HOME, OR GET ALONG WITH OTHER PEOPLE: SOMEWHAT DIFFICULT
5. BEING SO RESTLESS THAT IT IS HARD TO SIT STILL: MORE THAN HALF THE DAYS
2. NOT BEING ABLE TO STOP OR CONTROL WORRYING: NEARLY EVERY DAY
7. FEELING AFRAID AS IF SOMETHING AWFUL MIGHT HAPPEN: MORE THAN HALF THE DAYS
GAD7 TOTAL SCORE: 16
4. TROUBLE RELAXING: NEARLY EVERY DAY
GAD7 TOTAL SCORE: 16
3. WORRYING TOO MUCH ABOUT DIFFERENT THINGS: MORE THAN HALF THE DAYS
8. IF YOU CHECKED OFF ANY PROBLEMS, HOW DIFFICULT HAVE THESE MADE IT FOR YOU TO DO YOUR WORK, TAKE CARE OF THINGS AT HOME, OR GET ALONG WITH OTHER PEOPLE?: SOMEWHAT DIFFICULT
GAD7 TOTAL SCORE: 16
1. FEELING NERVOUS, ANXIOUS, OR ON EDGE: MORE THAN HALF THE DAYS

## 2025-05-29 ASSESSMENT — PATIENT HEALTH QUESTIONNAIRE - PHQ9
10. IF YOU CHECKED OFF ANY PROBLEMS, HOW DIFFICULT HAVE THESE PROBLEMS MADE IT FOR YOU TO DO YOUR WORK, TAKE CARE OF THINGS AT HOME, OR GET ALONG WITH OTHER PEOPLE: VERY DIFFICULT
SUM OF ALL RESPONSES TO PHQ QUESTIONS 1-9: 11
SUM OF ALL RESPONSES TO PHQ QUESTIONS 1-9: 11

## 2025-05-29 ASSESSMENT — ENCOUNTER SYMPTOMS
EYES NEGATIVE: 1
NERVOUS/ANXIOUS: 1
MUSCULOSKELETAL NEGATIVE: 1
RESPIRATORY NEGATIVE: 1
ENDOCRINE NEGATIVE: 1
HEMATOLOGIC/LYMPHATIC NEGATIVE: 1
SLEEP DISTURBANCE: 1
CONSTITUTIONAL NEGATIVE: 1
DYSURIA: 1
CARDIOVASCULAR NEGATIVE: 1
ARTHRALGIAS: 0
GASTROINTESTINAL NEGATIVE: 1
FREQUENCY: 1
NEUROLOGICAL NEGATIVE: 1
ALLERGIC/IMMUNOLOGIC NEGATIVE: 1

## 2025-05-29 ASSESSMENT — PAIN SCALES - GENERAL: PAINLEVEL_OUTOF10: NO PAIN (0)

## 2025-05-29 NOTE — PROGRESS NOTES
Mary Alice was seen today for anxiety, diabetes, uti, blood pressure machine , imm/inj and health maintenance.    Diagnoses and all orders for this visit:    Type 2 diabetes mellitus without complication, without long-term current use of insulin (H)  -     Hemoglobin A1c  -     Basic metabolic panel  (Ca, Cl, CO2, Creat, Gluc, K, Na, BUN)  -     Albumin Random Urine Quantitative with Creat Ratio  -     semaglutide (OZEMPIC) 2 MG/3ML pen; Inject 0.25 mg subcutaneously every 7 days for 4 doses.  -     A1C was much worse than the last check. Patient has not been on her medications  -     Re faxed medication to the pharmacy.     Lipid screening  -     Lipid panel reflex to direct LDL Non-fasting    Urinary tract infection symptoms  -     UA Macroscopic with reflex to Microscopic and Culture - Lab Collect; Future  -     UA Macroscopic with reflex to Microscopic and Culture - Lab Collect  -     UA Microscopic with Reflex to Culture  -     Urine Culture Aerobic Bacterial - lab collect  -     sulfamethoxazole-trimethoprim (BACTRIM DS) 800-160 MG tablet; Take 1 tablet by mouth 2 times daily for 3 days.  -     UA suggestive of an infection.     Adjustment reaction with anxiety and depression  -     sertraline (ZOLOFT) 50 MG tablet; Take 1 tablet (50 mg) by mouth daily.  -     Patient is having significant anxiety and depression. Recommend sertraline.     Hypertension  -     Blood pressure elevated. Patient has been under a lot of stress  -     Recommend that she continue to monitor.            Subjective   Mary Alice is a 40 year old, presenting for the following health issues:    Patient is a 40-year-old female here with several issues.    Urinary symptoms  She has been having urinary symptoms the last few weeks.  She reports some burning and frequency.  No blood in her urine.  She also has urgency.  No flank pain, no abdominal pain.  No fevers or chills.    Grief/Anxiety/Depression  Patient also recently lost her dad and she is  having a hard time with the grief.  She is requesting medications to help with her depression and anxiety.  She said the anxiety  level have increased.  She was on escitalopram 20 mg and Abilify but she stopped taking her medications a few months ago when she was taking care of her dad.  She will like to try something else for the anxiety and depression.  She is also trying to find some grief counseling.    Type II diabetes  Patient has type 2 diabetes.  She has not been on her medication for several months because of taking care of her dad.  A1c today is 8.2.  We discussed restarting Ozempic.  She is open to this.    Hypertension.  Patient has hypertension.  Blood pressure is noted to be high today.  She is under a lot of stress and was tearful during the encounter.  She says she has been compliant with taking her amlodipine which she takes daily.  I recommended that she continue her medication.  I suggest I suspect that the stress that she is going through is making her blood pressure go up.  Will not adjust medication dosing for now.  Advised patient to reach to her in a couple of weeks for recheck.      Anxiety (Recheck on anxiety.), Diabetes (Recheck on diabetes.), UTI (Feels she may have a UTI.  This stared a few days ago.  She was at a water park over the weekend and is not sure if it is related to that.), Blood pressure machine  (She wanted her blood pressure machine (wrist) compared to the clinic reading.  Clinic reading was 144/92.  Patient's machine reading was 152/96.), Imm/Inj (Declined the Covid injection today.), and Health Maintenance (Reminded her she is due for a pap and physical in the near future.  She declined to have the pap done today.  Will set up another appointment for that.)        5/29/2025     9:35 AM   Additional Questions   Roomed by Mariel Ashton CMA   Accompanied by Self     Chief Complaint   Patient presents with    Anxiety     Recheck on anxiety.    Diabetes     Recheck on  diabetes.    UTI     Feels she may have a UTI.  This stared a few days ago.  She was at a water park over the weekend and is not sure if it is related to that.    Blood pressure machine      She wanted her blood pressure machine (wrist) compared to the clinic reading.  Clinic reading was 144/92.  Patient's machine reading was 152/96.    Imm/Inj     Declined the Covid injection today.    Health Maintenance     Reminded her she is due for a pap and physical in the near future.  She declined to have the pap done today.  Will set up another appointment for that.       Via the Health Maintenance questionnaire, the patient has reported the following services have been completed -Eye Exam: [facility] 2024-01-24, this information has been sent to the abstraction team.  Anxiety  Pertinent negatives include no arthralgias.   UTI  Pertinent negatives include no arthralgias.   History of Present Illness       Diabetes:   She presents for follow up of diabetes.  She is checking home blood glucose three times daily.   She checks blood glucose after meals.  Blood glucose is sometimes over 200 and never under 70. She is aware of hypoglycemia symptoms including dizziness and lethargy.    She has no concerns regarding her diabetes at this time.  She is having excessive thirst and weight gain.  The patient has had a diabetic eye exam in the last 12 months. Eye exam performed on 2024. Location of last eye exam [facility].        Reason for visit:  Anxiety depression and possible UTI    She eats 2-3 servings of fruits and vegetables daily.She consumes 1 sweetened beverage(s) daily.She exercises with enough effort to increase her heart rate 30 to 60 minutes per day.  She exercises with enough effort to increase her heart rate 5 days per week.   She is taking medications regularly.          Depression and Anxiety   How are you doing with your depression since your last visit? Worsened-her dad recently passed away.  How  are you doing with your anxiety since your last visit?  Worsened-see above.  States her anxiety was getting worse prior to that.  Are you having other symptoms that might be associated with depression or anxiety? Has the anxiety medication to take prior to going to bed.  Will  work through a panic attack with her girlfriend or with breathing.  Felt her blood pressure was becoming higher.  She did buy a machine and has been watching that.  Have you had a significant life event? Grief or Loss   Do you have any concerns with your use of alcohol or other drugs? No    Social History     Tobacco Use    Smoking status: Former     Types: Cigarettes    Smokeless tobacco: Never   Vaping Use    Vaping status: Never Used   Substance Use Topics    Alcohol use: Not Currently     Comment: quit with pregnancy    Drug use: No         9/13/2023    12:34 PM 5/29/2024    11:01 AM 5/29/2025     9:24 AM   PHQ   PHQ-9 Total Score 4 3 11    Q9: Thoughts of better off dead/self-harm past 2 weeks Not at all Not at all Not at all       Patient-reported         10/26/2023    10:51 AM 5/29/2024    11:01 AM 5/29/2025     9:25 AM   ROBERT-7 SCORE   Total Score 5 (mild anxiety) 17 (severe anxiety) 16 (severe anxiety)   Total Score 5 17 16        Patient-reported         5/29/2025     9:24 AM   Last PHQ-9   1.  Little interest or pleasure in doing things 1   2.  Feeling down, depressed, or hopeless 1   3.  Trouble falling or staying asleep, or sleeping too much 2   4.  Feeling tired or having little energy 2   5.  Poor appetite or overeating 2   6.  Feeling bad about yourself 1   7.  Trouble concentrating 1   8.  Moving slowly or restless 1   Q9: Thoughts of better off dead/self-harm past 2 weeks 0   PHQ-9 Total Score 11        Patient-reported         5/29/2025     9:25 AM   ROBERT-7    1. Feeling nervous, anxious, or on edge 2   2. Not being able to stop or control worrying 3   3. Worrying too much about different things 2   4. Trouble relaxing 3   5.  "Being so restless that it is hard to sit still 2   6. Becoming easily annoyed or irritable 2   7. Feeling afraid, as if something awful might happen 2   ROBERT-7 Total Score 16    If you checked any problems, how difficult have they made it for you to do your work, take care of things at home, or get along with other people? Somewhat difficult       Patient-reported       Suicide Assessment Five-step Evaluation and Treatment (SAFE-T)      Genitourinary - Female  Onset/Duration: 3 days  Description:   Painful urination (Dysuria): YES- burning and stinging after urinating.           Frequency: YES  Blood in urine (Hematuria): No  Delay in urine (Hesitency): No  Intensity: mild  Progression of Symptoms:  worsening  Accompanying Signs & Symptoms:  Fever/chills: No  Flank pain: No  Nausea and vomiting: No  Vaginal symptoms: itching  Abdominal/Pelvic Pain: YES- some cramping on the left side.  History:   History of frequent UTI s: No  History of kidney stones: No  Sexually Active: No  Possibility of pregnancy: No  Precipitating or alleviating factors: None  Therapies tried and outcome: OTC advil or tylenol-the last two nights when going to bed.          Review of Systems   Constitutional: Negative.    HENT: Negative.     Eyes: Negative.    Respiratory: Negative.     Cardiovascular: Negative.    Gastrointestinal: Negative.    Endocrine: Negative.    Genitourinary:  Positive for dysuria, frequency and urgency.   Musculoskeletal: Negative.  Negative for arthralgias.   Skin: Negative.    Allergic/Immunologic: Negative.    Neurological: Negative.    Hematological: Negative.    Psychiatric/Behavioral:  Positive for behavioral problems and sleep disturbance. Negative for self-injury. The patient is nervous/anxious.           Objective    BP (!) 144/92 (BP Location: Left arm, Patient Position: Chair, Cuff Size: Adult Large)   Pulse 87   Temp 98.2  F (36.8  C) (Tympanic)   Resp 24   Ht 1.632 m (5' 4.25\")   Wt 132 kg (291 lb)   " LMP 05/15/2025 (Approximate)   SpO2 97%   BMI 49.56 kg/m    Body mass index is 49.56 kg/m .  Physical Exam   GENERAL: alert and no distress  EYES: Eyes grossly normal to inspection, PERRL and conjunctivae and sclerae normal  HENT: ear canals and TM's normal, nose and mouth without ulcers or lesions  NECK: no adenopathy, no asymmetry, masses, or scars  RESP: lungs clear to auscultation - no rales, rhonchi or wheezes  CV: regular rate and rhythm, normal S1 S2, no S3 or S4, no murmur, click or rub, no peripheral edema  ABDOMEN: soft, nontender, no hepatosplenomegaly, no masses and bowel sounds normal  MS: no gross musculoskeletal defects noted, no edema  SKIN: no suspicious lesions or rashes  PSYCH: mentation appears normal, affect flat, tearful, anxious, judgement and insight intact, and appearance well groomed    Results for orders placed or performed in visit on 05/29/25 (from the past 24 hours)   Hemoglobin A1c   Result Value Ref Range    Estimated Average Glucose 189 (H) <117 mg/dL    Hemoglobin A1C 8.2 (H) 0.0 - 5.6 %    Narrative    Results confirmed by repeat test.    Lipid panel reflex to direct LDL Non-fasting   Result Value Ref Range    Cholesterol 157 <200 mg/dL    Triglycerides 139 <150 mg/dL    Direct Measure HDL 53 >=50 mg/dL    LDL Cholesterol Calculated 76 <100 mg/dL    Non HDL Cholesterol 104 <130 mg/dL    Patient Fasting > 8hrs? No     Narrative    Cholesterol  Desirable: < 200 mg/dL  Borderline High: 200 - 239 mg/dL  High: >= 240 mg/dL    Triglycerides  Normal: < 150 mg/dL  Borderline High: 150 - 199 mg/dL  High: 200-499 mg/dL  Very High: >= 500 mg/dL    Direct Measure HDL  Female: >= 50 mg/dL   Male: >= 40 mg/dL    LDL Cholesterol  Desirable: < 100 mg/dL  Above Desirable: 100 - 129 mg/dL   Borderline High: 130 - 159 mg/dL   High:  160 - 189 mg/dL   Very High: >= 190 mg/dL    Non HDL Cholesterol  Desirable: < 130 mg/dL  Above Desirable: 130 - 159 mg/dL  Borderline High: 160 - 189 mg/dL  High: 190  - 219 mg/dL  Very High: >= 220 mg/dL   Basic metabolic panel  (Ca, Cl, CO2, Creat, Gluc, K, Na, BUN)   Result Value Ref Range    Sodium 139 135 - 145 mmol/L    Potassium 4.6 3.4 - 5.3 mmol/L    Chloride 100 98 - 107 mmol/L    Carbon Dioxide (CO2) 27 22 - 29 mmol/L    Anion Gap 12 7 - 15 mmol/L    Urea Nitrogen 14.3 6.0 - 20.0 mg/dL    Creatinine 0.52 0.51 - 0.95 mg/dL    GFR Estimate >90 >60 mL/min/1.73m2    Calcium 9.3 8.8 - 10.4 mg/dL    Glucose 254 (H) 70 - 99 mg/dL    Patient Fasting > 8hrs? No    UA Macroscopic with reflex to Microscopic and Culture - Lab Collect    Specimen: Urine, Clean Catch   Result Value Ref Range    Color Urine Yellow Colorless, Straw, Light Yellow, Yellow    Appearance Urine Clear Clear    Glucose Urine 250 (A) Negative mg/dL    Bilirubin Urine Negative Negative    Ketones Urine Negative Negative mg/dL    Specific Gravity Urine 1.010 1.003 - 1.035    Blood Urine Negative Negative    pH Urine 6.0 5.0 - 7.0    Protein Albumin Urine Negative Negative mg/dL    Urobilinogen Urine 0.2 0.2, 1.0 E.U./dL    Nitrite Urine Negative Negative    Leukocyte Esterase Urine Trace (A) Negative   UA Microscopic with Reflex to Culture   Result Value Ref Range    Bacteria Urine Few (A) None Seen /HPF    RBC Urine None Seen 0-2 /HPF /HPF    WBC Urine 5-10 (A) 0-5 /HPF /HPF    Squamous Epithelials Urine Few (A) None Seen /LPF    Narrative    Urine Culture not indicated           Signed Electronically by: Jose Escobar MD

## 2025-07-23 DIAGNOSIS — I10 BENIGN ESSENTIAL HYPERTENSION: ICD-10-CM

## 2025-07-23 RX ORDER — AMLODIPINE BESYLATE 2.5 MG/1
5 TABLET ORAL DAILY
Qty: 180 TABLET | Refills: 0 | Status: SHIPPED | OUTPATIENT
Start: 2025-07-23

## 2025-07-23 NOTE — TELEPHONE ENCOUNTER
Requested Prescriptions   Pending Prescriptions Disp Refills    amLODIPine (NORVASC) 2.5 MG tablet [Pharmacy Med Name: amLODIPine Besylate 2.5 MG Oral Tablet] 180 tablet 0     Sig: Take 2 tablets by mouth once daily       Calcium Channel Blockers Protocol  Failed - 7/23/2025 10:04 AM        Failed - Most recent blood pressure under 140/90 in past 12 months     BP Readings from Last 3 Encounters:   05/29/25 (!) 144/92   02/05/25 129/82   07/04/24 (!) 148/82       No data recorded            Passed - Medication is active on med list and the sig matches. RN to manually verify dose and sig if red X/fail.     If the protocol passes (green check), you do not need to verify med dose and sig.    A prescription matches if they are the same clinical intention.    For Example: once daily and every morning are the same.    The protocol can not identify upper and lower case letters as matching and will fail.     For Example: Take 1 tablet (50 mg) by mouth daily     TAKE 1 TABLET (50 MG) BY MOUTH DAILY    For all fails (red x), verify dose and sig.    If the refill does match what is on file, the RN can still proceed to approve the refill request.       If they do not match, route to the appropriate provider.             Passed - Medication is indicated for associated diagnosis        Passed - Recent (12 month) or future (90 days) visit with authorizing provider's specialty (provided they have been seen in the past 15 months)     The patient must have completed an in-person or virtual visit within the past 12 months or has a future visit scheduled within the next 90 days with the authorizing provider s specialty.  Urgent care and e-visits do not qualify as an office visit for this protocol.          Passed - Patient is age 18 or older        Passed - No active pregnancy on record        Passed - No positive pregnancy test in past 12 months

## 2025-09-03 ENCOUNTER — MYC MEDICAL ADVICE (OUTPATIENT)
Dept: FAMILY MEDICINE | Facility: CLINIC | Age: 40
End: 2025-09-03

## 2025-09-03 DIAGNOSIS — E11.9 TYPE 2 DIABETES MELLITUS WITHOUT COMPLICATION, UNSPECIFIED WHETHER LONG TERM INSULIN USE (H): Primary | ICD-10-CM

## 2025-09-03 RX ORDER — METFORMIN HYDROCHLORIDE 500 MG/1
500 TABLET, EXTENDED RELEASE ORAL
Qty: 90 TABLET | Refills: 0 | Status: SHIPPED | OUTPATIENT
Start: 2025-09-03